# Patient Record
Sex: FEMALE | Race: WHITE | NOT HISPANIC OR LATINO | Employment: FULL TIME | URBAN - METROPOLITAN AREA
[De-identification: names, ages, dates, MRNs, and addresses within clinical notes are randomized per-mention and may not be internally consistent; named-entity substitution may affect disease eponyms.]

---

## 2017-05-04 DIAGNOSIS — Z30.011 ENCOUNTER FOR INITIAL PRESCRIPTION OF CONTRACEPTIVE PILLS: ICD-10-CM

## 2017-05-04 RX ORDER — ACETAMINOPHEN AND CODEINE PHOSPHATE 120; 12 MG/5ML; MG/5ML
1 SOLUTION ORAL DAILY
Qty: 84 TABLET | Refills: 3 | Status: CANCELLED | OUTPATIENT
Start: 2017-05-04

## 2017-05-04 NOTE — TELEPHONE ENCOUNTER
Pending Prescriptions:                       Disp   Refills    norethindrone (MICRONOR) 0.35 MG per tabl*84 tab*3            Sig: Take 1 tablet (0.35 mg) by mouth daily          Last Written Prescription Date: 02/23/2016  Last Fill Quantity: 84, # refills: 3  Last Office Visit with FMWILDA, P or [facility] prescribing provider: 11/28/2016       BP Readings from Last 3 Encounters:   11/28/16 122/70   02/23/16 120/60   01/15/16 117/71     Date of last Breast Exam:     Jad BOWLEST

## 2017-05-04 NOTE — TELEPHONE ENCOUNTER
PT needs PX and would like to discuss possible change to depo    Scheduled PX for tomorrow.     Sunitha Larsen RN

## 2017-05-05 ENCOUNTER — OFFICE VISIT (OUTPATIENT)
Dept: FAMILY MEDICINE | Facility: CLINIC | Age: 32
End: 2017-05-05
Payer: COMMERCIAL

## 2017-05-05 ENCOUNTER — TELEPHONE (OUTPATIENT)
Dept: SURGERY | Facility: CLINIC | Age: 32
End: 2017-05-05

## 2017-05-05 VITALS
WEIGHT: 150.2 LBS | TEMPERATURE: 98.6 F | HEART RATE: 68 BPM | SYSTOLIC BLOOD PRESSURE: 110 MMHG | HEIGHT: 66 IN | BODY MASS INDEX: 24.14 KG/M2 | OXYGEN SATURATION: 98 % | DIASTOLIC BLOOD PRESSURE: 60 MMHG

## 2017-05-05 DIAGNOSIS — Z13.89 SCREENING FOR DIABETIC PERIPHERAL NEUROPATHY: ICD-10-CM

## 2017-05-05 DIAGNOSIS — Z30.42 ENCOUNTER FOR SURVEILLANCE OF INJECTABLE CONTRACEPTIVE: ICD-10-CM

## 2017-05-05 DIAGNOSIS — E10.9 TYPE 1 DIABETES MELLITUS WITHOUT COMPLICATION (H): ICD-10-CM

## 2017-05-05 DIAGNOSIS — Z00.00 ENCOUNTER FOR ROUTINE ADULT HEALTH EXAMINATION WITHOUT ABNORMAL FINDINGS: Primary | ICD-10-CM

## 2017-05-05 DIAGNOSIS — K62.5 HEMORRHAGE OF RECTUM AND ANUS: ICD-10-CM

## 2017-05-05 LAB
BETA HCG QUAL IFA URINE: NEGATIVE
CHOLEST SERPL-MCNC: 142 MG/DL
CREAT SERPL-MCNC: 0.55 MG/DL (ref 0.52–1.04)
CREAT UR-MCNC: 59 MG/DL
GFR SERPL CREATININE-BSD FRML MDRD: NORMAL ML/MIN/1.7M2
HBA1C MFR BLD: 6.5 % (ref 4.3–6)
HDLC SERPL-MCNC: 38 MG/DL
LDLC SERPL CALC-MCNC: 93 MG/DL
MICROALBUMIN UR-MCNC: 8 MG/L
MICROALBUMIN/CREAT UR: 13.36 MG/G CR (ref 0–25)
NONHDLC SERPL-MCNC: 104 MG/DL
TRIGL SERPL-MCNC: 57 MG/DL

## 2017-05-05 PROCEDURE — 36415 COLL VENOUS BLD VENIPUNCTURE: CPT | Performed by: NURSE PRACTITIONER

## 2017-05-05 PROCEDURE — 96372 THER/PROPH/DIAG INJ SC/IM: CPT | Performed by: NURSE PRACTITIONER

## 2017-05-05 PROCEDURE — 83036 HEMOGLOBIN GLYCOSYLATED A1C: CPT | Performed by: NURSE PRACTITIONER

## 2017-05-05 PROCEDURE — 99395 PREV VISIT EST AGE 18-39: CPT | Mod: 25 | Performed by: NURSE PRACTITIONER

## 2017-05-05 PROCEDURE — 80061 LIPID PANEL: CPT | Performed by: NURSE PRACTITIONER

## 2017-05-05 PROCEDURE — 84703 CHORIONIC GONADOTROPIN ASSAY: CPT | Performed by: NURSE PRACTITIONER

## 2017-05-05 PROCEDURE — 82565 ASSAY OF CREATININE: CPT | Performed by: NURSE PRACTITIONER

## 2017-05-05 PROCEDURE — 82043 UR ALBUMIN QUANTITATIVE: CPT | Performed by: NURSE PRACTITIONER

## 2017-05-05 PROCEDURE — 99207 C FOOT EXAM  NO CHARGE: CPT | Mod: 25 | Performed by: NURSE PRACTITIONER

## 2017-05-05 RX ORDER — MEDROXYPROGESTERONE ACETATE 150 MG/ML
150 INJECTION, SUSPENSION INTRAMUSCULAR
Qty: 1 ML | Refills: 3 | OUTPATIENT
Start: 2017-05-05 | End: 2017-08-07 | Stop reason: SINTOL

## 2017-05-05 NOTE — MR AVS SNAPSHOT
After Visit Summary   5/5/2017    Guerline Rock    MRN: [id number]           Patient Information     Date Of Birth          1985        Visit Information        Provider Department      5/5/2017 8:15 AM Ana Paula Hernandez NP [facility]        Today's Diagnoses     Encounter for routine adult health examination without abnormal findings    -  1    Screening for diabetic peripheral neuropathy        Type 1 diabetes mellitus without complication (H)        Encounter for surveillance of injectable contraceptive        Hemorrhage of rectum and anus          Care Instructions      Preventive Health Recommendations  Female Ages 26 - 39  Yearly exam:   See your health care provider every year in order to    Review health changes.     Discuss preventive care.      Review your medicines if you your doctor has prescribed any.    Until age 30: Get a Pap test every three years (more often if you have had an abnormal result).    After age 30: Talk to your doctor about whether you should have a Pap test every 3 years or have a Pap test with HPV screening every 5 years.   You do not need a Pap test if your uterus was removed (hysterectomy) and you have not had cancer.  You should be tested each year for STDs (sexually transmitted diseases), if you're at risk.   Talk to your provider about how often to have your cholesterol checked.  If you are at risk for diabetes, you should have a diabetes test (fasting glucose).  Shots: Get a flu shot each year. Get a tetanus shot every 10 years.   Nutrition:     Eat at least 5 servings of fruits and vegetables each day.    Eat whole-grain bread, whole-wheat pasta and brown rice instead of white grains and rice.    Talk to your provider about Calcium and Vitamin D.     Lifestyle    Exercise at least 150 minutes a week (30 minutes a day, 5 days of the week). This will help you control your weight and prevent disease.    Limit alcohol to one drink per day.    No  smoking.     Wear sunscreen to prevent skin cancer.    See your dentist every six months for an exam and cleaning.          Follow-ups after your visit        Additional Services     COLORECTAL SURGERY REFERRAL       Your provider has referred you to: DANY: [facility] [phone]   [email or web address]    Referral Reason(s): Hemorrhoids  Special Concerns:   This referral is: Elective (week +)  It is OK to leave a message on patient's voicemail.    Please be aware that coverage of these services is subject to the terms and limitations of your health insurance plan.  Call member services at your health plan with any benefit or coverage questions.      Please bring the following with you to your appointment:    (1) Any X-Rays, CTs or MRIs which have been performed.  Contact the facility where they were done to arrange for  prior to your scheduled appointment.    (2) List of current medications  (3) This referral request   (4) Any documents/labs given to you for this referral                  Who to contact     If you have questions or need follow up information about today's clinic visit or your schedule please contact [facility] directly at [phone].  Normal or non-critical lab and imaging results will be communicated to you by MyChart, letter or phone within 4 business days after the clinic has received the results. If you do not hear from us within 7 days, please contact the clinic through MyChart or phone. If you have a critical or abnormal lab result, we will notify you by phone as soon as possible.  Submit refill requests through [employer] or call your pharmacy and they will forward the refill request to us. Please allow 3 business days for your refill to be completed.          Additional Information About Your Visit        [employer] Information     [employer] gives you secure access to your electronic health record. If  "you see a primary care provider, you can also send messages to your care team and make appointments. If you have questions, please call your primary care clinic.  If you do not have a primary care provider, please call [phone] and they will assist you.        Care EveryWhere ID     This is your Care EveryWhere ID. This could be used by other organizations to access your [place] medical records  [id number]        Your Vitals Were     Pulse Temperature Height Last Period Pulse Oximetry Breastfeeding?    68 98.6  F (37  C) (Oral) 5' 6\" (1.676 m) 04/25/2017 (Approximate) 98% No    BMI (Body Mass Index)                   24.24 kg/m2            Blood Pressure from Last 3 Encounters:   05/05/17 110/60   11/28/16 122/70   02/23/16 120/60    Weight from Last 3 Encounters:   05/05/17 150 lb 3.2 oz (68.1 kg)   11/28/16 145 lb (65.8 kg)   02/23/16 154 lb (69.9 kg)              We Performed the Following     Albumin Random Urine Quantitative     Beta HCG qual IFA urine     COLORECTAL SURGERY REFERRAL     CREATININE     FOOT EXAM  NO CHARGE [26787.114]     Hemoglobin A1c     Lipid panel reflex to direct LDL          Today's Medication Changes          These changes are accurate as of: 5/5/17  9:04 AM.  If you have any questions, ask your nurse or doctor.               Start taking these medicines.        Dose/Directions    blood glucose monitoring test strip   Commonly known as:  no brand specified   Used for:  Type 1 diabetes mellitus without complication (H)   Started by:  Ana Paula Hernandez NP        Use to test blood sugars 6 times daily or as directed   Quantity:  100 strip   Refills:  11       medroxyPROGESTERone 150 MG/ML injection   Commonly known as:  DEPO-PROVERA   Used for:  Encounter for surveillance of injectable contraceptive   Started by:  Ana Paula Hernandez NP        Dose:  150 mg   Inject 1 mL (150 mg) into the muscle every 3 months   Quantity:  1 mL   Refills:  3            Where to get your medicines    "   These medications were sent to [facility] [address] [place], [place] - [address] [facility]  [address] [facility] [address]    Hours:  Tech issues with their phone system Phone:  [phone]     blood glucose monitoring test strip         Some of these will need a paper prescription and others can be bought over the counter.  Ask your nurse if you have questions.     You don't need a prescription for these medications     medroxyPROGESTERone 150 MG/ML injection                Primary Care Provider Office Phone # Fax #    Ana Paula Hernandez [phone]       [facility] [address] [address] [facility] [address]        Thank you!     Thank you for choosing [facility]  for your care. Our goal is always to provide you with excellent care. Hearing back from our patients is one way we can continue to improve our services. Please take a few minutes to complete the written survey that you may receive in the mail after your visit with us. Thank you!             Your Updated Medication List - Protect others around you: Learn how to safely use, store and throw away your medicines at www.disposemymeds.org.          This list is accurate as of: 5/5/17  9:04 AM.  Always use your most recent med list.                   Brand Name Dispense Instructions for use    blood glucose monitoring test strip    no brand specified    100 strip    Use to test blood sugars 6 times daily or as directed       humaLOG 100 UNIT/ML injection   Generic drug:  insulin lispro     3 Month    *** units before breakfast, *** units before lunch, *** units before dinner       medroxyPROGESTERone 150 MG/ML injection    DEPO-PROVERA    1 mL    Inject 1 mL (150 mg) into the muscle every 3 months       norethindrone 0.35 MG per tablet    MICRONOR    84 tablet    Take 1 tablet (0.35 mg) by mouth daily       OMEGA-3 FISH OIL PO      Reported on 5/5/2017

## 2017-05-05 NOTE — TELEPHONE ENCOUNTER
Referral received from Ana Paula Hernandez  for hemorrhage of rectum and anus.    Attempt #1:    Called patient at [phone].   No answer - left message for patient to return call to clinic at [phone].  Margie

## 2017-05-05 NOTE — PROGRESS NOTES
SUBJECTIVE:     CC: Guerline Rock is an 31 year old woman who presents for preventive health visit.     Healthy Habits:    Do you get at least three servings of calcium containing foods daily (dairy, green leafy vegetables, etc.)? yes    Amount of exercise or daily activities, outside of work: 4 day(s) per week    Problems taking medications regularly No    Medication side effects: No    Have you had an eye exam in the past two years? no    Do you see a dentist twice per year? yes    Do you have sleep apnea, excessive snoring or daytime drowsiness?yes  Guerline is here for a physical and to get test strips to check her diabetes Type 1. Needs to go back to endocrine but has been without good insurance.       No concerns    Today's PHQ-2 Score:   PHQ-2 ( 1999 Pf[employer]) 11/28/2016 6/30/2015   Q1: Little interest or pleasure in doing things 0 0   Q2: Feeling down, depressed or hopeless 0 0   PHQ-2 Score 0 0       Abuse: Current or Past(Physical, Sexual or Emotional)- No  Do you feel safe in your environment - Yes    Social History   Substance Use Topics     Smoking status: Never Smoker     Smokeless tobacco: Never Used     Alcohol use No     The patient does not drink >3 drinks per day nor >7 drinks per week.    Recent Labs   Lab Test 02/17/16 11/24/14   CHOL  198  165   HDL  50  37   LDL  119  105   TRIG  147  114   CHOLHDLRATIO   --   4.5   NHDL  148   --        Reviewed orders with patient.  Reviewed health maintenance and updated orders accordingly - Yes    Mammo Decision Support:  Mammogram not appropriate for this patient based on age.    Pertinent mammograms are reviewed under the imaging tab.  History of abnormal Pap smear: NO - age 21-29 PAP every 3 years recommended    Reviewed and updated as needed this visit by clinical staff  Tobacco  Allergies  Meds  Problems  Med Hx  Surg Hx  Fam Hx  Soc Hx          Reviewed and updated as needed this visit by Provider  Allergies  Meds  Problems  Med Hx  Surg Hx  Fam  "Hx            ROS:  C: NEGATIVE for fever, chills, change in weight  I: NEGATIVE for worrisome rashes, moles or lesions  E: NEGATIVE for vision changes or irritation  ENT: NEGATIVE for ear, mouth and throat problems  R: NEGATIVE for significant cough or SOB  B: NEGATIVE for masses, tenderness or discharge  CV: NEGATIVE for chest pain, palpitations or peripheral edema  GI: NEGATIVE for nausea, abdominal pain, heartburn, or change in bowel habits  : NEGATIVE for unusual urinary or vaginal symptoms. Periods are regular.  M: NEGATIVE for significant arthralgias or myalgia  N: NEGATIVE for weakness, dizziness or paresthesias  P: NEGATIVE for changes in mood or affect    Problem list, Medication list, Allergies, and Medical/Social/Surgical histories reviewed in EPIC and updated as appropriate.  OBJECTIVE:     /60 (BP Location: Right arm, Patient Position: Chair, Cuff Size: Adult Regular)  Pulse 68  Temp 98.6  F (37  C) (Oral)  Ht 5' 6\" (1.676 m)  Wt 150 lb 3.2 oz (68.1 kg)  LMP 04/25/2017 (Approximate)  SpO2 98%  Breastfeeding? No  BMI 24.24 kg/m2  EXAM:  GENERAL: healthy, alert and no distress  EYES: Eyes grossly normal to inspection, PERRL and conjunctivae and sclerae normal  HENT: ear canals and TM's normal, nose and mouth without ulcers or lesions  NECK: no adenopathy, no asymmetry, masses, or scars and thyroid normal to palpation  RESP: lungs clear to auscultation - no rales, rhonchi or wheezes  CV: regular rate and rhythm, normal S1 S2, no S3 or S4, no murmur, click or rub, no peripheral edema and peripheral pulses strong  ABDOMEN: soft, nontender, no hepatosplenomegaly, no masses and bowel sounds normal  MS: no gross musculoskeletal defects noted, no edema  SKIN: no suspicious lesions or rashes  BACK: no CVA tenderness, no paralumbar tenderness  PSYCH: mentation appears normal, affect normal/bright    ASSESSMENT/PLAN:     1. Encounter for routine adult health examination without abnormal " "findings  Fasting lipid panel drawn today  - Lipid panel reflex to direct LDL    2. Screening for diabetic peripheral neuropathy  Foot exam is within normal limits. 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   - FOOT EXAM  NO CHARGE [41776.304]    3. Type 1 diabetes mellitus without complication (H)  A1c is 6.5 and up slightly from 6.3. Will be going to endocrine in the next month.   - CREATININE  - Albumin Random Urine Quantitative  - Hemoglobin A1c  - blood glucose monitoring (NO BRAND SPECIFIED) test strip; Use to test blood sugars 6 times daily or as directed  Dispense: 100 strip; Refill: 11    4. Encounter for surveillance of injectable contraceptive  Would like to start on Depo provera since unable to remember to take OCP.. Urine pregnancy is negative.   - Beta HCG qual IFA urine  - medroxyPROGESTERone (DEPO-PROVERA) 150 MG/ML injection; Inject 1 mL (150 mg) into the muscle every 3 months  Dispense: 1 mL; Refill: 3    5. Hemorrhage of rectum and anus  Ongoing issues with hemorrhoids.   - COLORECTAL SURGERY REFERRAL    COUNSELING:   Reviewed preventive health counseling, as reflected in patient instructions       Regular exercise       Healthy diet/nutrition       Contraception         reports that she has never smoked. She has never used smokeless tobacco.    Estimated body mass index is 24.24 kg/(m^2) as calculated from the following:    Height as of this encounter: 5' 6\" (1.676 m).    Weight as of this encounter: 150 lb 3.2 oz (68.1 kg).       Counseling Resources:  ATP IV Guidelines  Pooled Cohorts Equation Calculator  Breast Cancer Risk Calculator  FRAX Risk Assessment  ICSI Preventive Guidelines  Dietary Guidelines for Americans, 2010  USDA's MyPlate  ASA Prophylaxis  Lung CA Screening    Ana Paula Hernandez NP  [facility]  "

## 2017-05-12 ENCOUNTER — OFFICE VISIT (OUTPATIENT)
Dept: SURGERY | Facility: CLINIC | Age: 32
End: 2017-05-12
Payer: COMMERCIAL

## 2017-05-12 VITALS
HEART RATE: 78 BPM | HEIGHT: 66 IN | SYSTOLIC BLOOD PRESSURE: 122 MMHG | OXYGEN SATURATION: 99 % | BODY MASS INDEX: 24.11 KG/M2 | DIASTOLIC BLOOD PRESSURE: 68 MMHG | WEIGHT: 150 LBS

## 2017-05-12 DIAGNOSIS — L29.0 ANAL ITCHING: Primary | ICD-10-CM

## 2017-05-12 PROCEDURE — 99203 OFFICE O/P NEW LOW 30 MIN: CPT | Mod: 25 | Performed by: SURGERY

## 2017-05-12 PROCEDURE — 46600 DIAGNOSTIC ANOSCOPY SPX: CPT | Performed by: SURGERY

## 2017-05-12 NOTE — LETTER
[facility]      Outpatient Consultation    May 12, 2017      Guerline Rock MRN# [id number]   YOB: 1985 Age: 31 year old      Primary care provider: Ana Paula Hernandez     ASSESSMENT:   Guerline Rock is an 31 year old year old female who I was asked to see by Dr. Ana Paula Hernandez for anorectal problem.     Pruritus ani with bleeding.     RECOMMENDATIONS:   We discussed this. Anal itching may result from a number of possible triggers including overly aggressive hygiene, dietary factors, infections, seepage, or other reasons. I recommended that we attempt to address these potential causes. Certain foods such as beer, cola, coffee, etc. can be associated with this and I recommended to her that she cease intake of these sequentially as a test to see if this affects things. I would like her not to do overly aggressive hygiene and to use mild soaps only. I think she should try fiber to see if this has any effect because this may decrease any tendency for seepage, which she may not even be aware of, but that could contribute itching. I prescribed her a combination of pramoxine hydrocortisone. If she is not getting better with these measures I would like her to check back with me.     Alex Quezada MD  [phone] (t)     This note was created using voice recognition software. Undetected word substitutions or other errors may have occurred.        HPI:    Guerline Rock is a 31 year old female who I was asked to see for hemorrhoids. The patient has been having difficulty with some bleeding. When I discussed this with her she tells me that her biggest concern is actually that she will have significant anal itching and will wipe vigorously for this and then will have some bleeding. She states that she itches fairly frequent. She has not noted any triggers. She does not recognize any significant anal seepage. There is no personal or family history of colon malignancies or IBD. The bleeding she has usually  "bright red and is on the toilet tissue.        ALLERGIES:   No Known Allergies      REVIEW OF SYSTEMS:   10 point ROS neg other than the symptoms noted above in the HPI or here.       PHYSICAL EXAM:   /68  Pulse 78  Ht 5' 6\" (1.676 m)  Wt 150 lb (68 kg)  LMP 04/25/2017 (Approximate)  SpO2 99%  BMI 24.21 kg/m2 Estimated body mass index is 24.21 kg/(m^2) as calculated from the following:    Height as of this encounter: 5' 6\" (1.676 m).    Weight as of this encounter: 150 lb (68 kg).   Constitutional: No acute distress  Eyes: Sclerae anicteric, moist conjunctivae; no lid-lag; PERRLA  ENT: Atraumatic; oropharynx clear with moist mucous membranes and no mucosal ulcerations; normal hard and soft palate  Neck: Supple neck without lymphadenopathy, no thyromegaly  Respiratory: Clear to auscultation bilaterally with normal respiratory effort  Cardiovascular: Regular rate and rhythm, no MRGs  GI: Soft, nontender, nondistended; no masses or HSM  Lymph/Hematologic: No pretibial edema  Genitourinary: Deferred  Skin: Normal temperature, turgor and texture; no rash, ulcers or subcutaneous nodules  Musculoskeletal: Grossly symmetric and normal muscle bulk for age in all extremities; gait and station normal; no clubbing or cyanosis  Neurologic: CN II-XII grossly intact without deficits; sensation intact to light touch over all extremities  Neuropsychiatric: Appropriate affect, alert and oriented to person, place and time  The patient is taken to the procedure room and placed in the knee-chest position. A rectal examination is performed. There are findings in the anterior midline of very small external hemorrhoid which appears roughened and denuded as well as friable. A digital rectal examination is performed. There is no mass. Anoscopy is performed. There are minimal internal hemorrhoids.     Alex Quezada MD  "

## 2017-05-12 NOTE — MR AVS SNAPSHOT
"              After Visit Summary   5/12/2017    Guerline Rock    MRN: [id number]           Patient Information     Date Of Birth          1985        Visit Information        Provider Department      5/12/2017 9:45 AM Alex Quezada MD [facility]      Today's Diagnoses     Anal itching    -  1       Follow-ups after your visit        Who to contact     If you have questions or need follow up information about today's clinic visit or your schedule please contact [facility] directly at [phone].  Normal or non-critical lab and imaging results will be communicated to you by [employer], letter or phone within 4 business days after the clinic has received the results. If you do not hear from us within 7 days, please contact the clinic through [employer] or phone. If you have a critical or abnormal lab result, we will notify you by phone as soon as possible.  Submit refill requests through [employer] or call your pharmacy and they will forward the refill request to us. Please allow 3 business days for your refill to be completed.          Additional Information About Your Visit        MyChart Information     [employer] gives you secure access to your electronic health record. If you see a primary care provider, you can also send messages to your care team and make appointments. If you have questions, please call your primary care clinic.  If you do not have a primary care provider, please call [phone] and they will assist you.        Care EveryWhere ID     This is your Care EveryWhere ID. This could be used by other organizations to access your [place] medical records  [id number]        Your Vitals Were     Pulse Height Last Period Pulse Oximetry BMI (Body Mass Index)       78 5' 6\" (1.676 m) 04/25/2017 (Approximate) 99% 24.21 kg/m2        Blood Pressure from Last 3 Encounters:   05/12/17 122/68   05/05/17 110/60   11/28/16 " 122/70    Weight from Last 3 Encounters:   05/12/17 150 lb (68 kg)   05/05/17 150 lb 3.2 oz (68.1 kg)   11/28/16 145 lb (65.8 kg)              Today, you had the following     No orders found for display         Today's Medication Changes          These changes are accurate as of: 5/12/17 11:59 PM.  If you have any questions, ask your nurse or doctor.               Start taking these medicines.        Dose/Directions    hydrocortisone-pramoxine 2.5-1 % cream   Commonly known as:  ANALPRAM HC   Used for:  Anal itching   Started by:  Alex Quezada MD        Place rectally 3 times daily   Quantity:  30 g   Refills:  1            Where to get your medicines      These medications were sent to [facility] [id number] [address]    Hours:  Tech issues with their phone system Phone:  [phone]     hydrocortisone-pramoxine 2.5-1 % cream                Primary Care Provider Office Phone # Fax #    Ana Paula Hernandez [phone]       [facility] [address] [address] [employer] [address]        Thank you!     Thank you for choosing [facility]  for your care. Our goal is always to provide you with excellent care. Hearing back from our patients is one way we can continue to improve our services. Please take a few minutes to complete the written survey that you may receive in the mail after your visit with us. Thank you!             Your Updated Medication List - Protect others around you: Learn how to safely use, store and throw away your medicines at www.disposemymeds.org.          This list is accurate as of: 5/12/17 11:59 PM.  Always use your most recent med list.                   Brand Name Dispense Instructions for use    blood glucose monitoring test strip    no brand specified    100 strip    Use to test blood sugars 6 times daily or as directed       humaLOG 100 UNIT/ML injection   Generic drug:   insulin lispro     3 Month    *** units before breakfast, *** units before lunch, *** units before dinner       hydrocortisone-pramoxine 2.5-1 % cream    ANALPRAM HC    30 g    Place rectally 3 times daily       medroxyPROGESTERone 150 MG/ML injection    DEPO-PROVERA    1 mL    Inject 1 mL (150 mg) into the muscle every 3 months       norethindrone 0.35 MG per tablet    MICRONOR    84 tablet    Take 1 tablet (0.35 mg) by mouth daily       OMEGA-3 FISH OIL PO      Reported on 5/5/2017

## 2017-05-23 ENCOUNTER — TRANSFERRED RECORDS (OUTPATIENT)
Dept: HEALTH INFORMATION MANAGEMENT | Facility: CLINIC | Age: 32
End: 2017-05-23

## 2017-05-23 LAB
ALT SERPL-CCNC: 17 U/L (ref 6–29)
CHOLEST SERPL-MCNC: 135 MG/DL (ref 125–200)
CREAT SERPL-MCNC: 0.59 MG/DL (ref 0.5–1.1)
GFR SERPL CREATININE-BSD FRML MDRD: 124 ML/MIN/1.73M2
GLUCOSE SERPL-MCNC: 119 MG/DL (ref 65–99)
HBA1C MFR BLD: 6.6 % (ref 4–6)
HDLC SERPL-MCNC: 34 MG/DL
LDLC SERPL CALC-MCNC: 81 MG/DL
NONHDLC SERPL-MCNC: 101 MG/DL
POTASSIUM SERPL-SCNC: 4 MMOL/L (ref 3.5–5.3)
TRIGL SERPL-MCNC: 99 MG/DL
TSH SERPL-ACNC: 1.21 UIU/ML (ref 0.3–5)

## 2017-05-23 NOTE — PROGRESS NOTES
[facility]     Outpatient Consultation     Guerline Rock MRN# [id number]   YOB: 1985 Age: 31 year old     Primary care provider: Ana Paula Hernandez    ASSESSMENT:   Guerline Rock is an 31 year old year old female who I was asked to see by Dr. Ana Paula Hernandez for anorectal problem.    Pruritus ani with bleeding.    RECOMMENDATIONS:   We discussed this. Anal itching may result from a number of possible triggers including overly aggressive hygiene, dietary factors, infections, seepage, or other reasons. I recommended that we attempt to address these potential causes. Certain foods such as beer, cola, coffee, etc. can be associated with this and I recommended to her that she cease intake of these sequentially as a test to see if this affects things. I would like her not to do overly aggressive hygiene and to use mild soaps only. I think she should try fiber to see if this has any effect because this may decrease any tendency for seepage, which she may not even be aware of, but that could contribute itching. I prescribed her a combination of pramoxine hydrocortisone. If she is not getting better with these measures I would like her to check back with me.    Alex Quezada MD  [phone] (h)    This note was created using voice recognition software. Undetected word substitutions or other errors may have occurred.      HPI:    Guerline Rock is a 31 year old female who I was asked to see for hemorrhoids. The patient has been having difficulty with some bleeding. When I discussed this with her she tells me that her biggest concern is actually that she will have significant anal itching and will wipe vigorously for this and then will have some bleeding. She states that she itches fairly frequent. She has not noted any triggers. She does not recognize any significant anal seepage. There is no personal or family history of colon malignancies or IBD. The bleeding she has usually bright red and is on the  "toilet tissue.          PAST MEDICAL HISTORY:     Past Medical History:   Diagnosis Date     Diabetes mellitus type 1 (H) 1/16/2014        PAST SURGICAL HISTORY:     Past Surgical History:   Procedure Laterality Date     DISCECTOMY LUMBAR MINIMALLY INVASIVE ONE LEVEL  2010, 2001    L4-L5     EYE SURGERY       FOOT SURGERY         SOCIAL HISTORY:     Social History     Social History     Marital status:      Spouse name: N/A     Number of children: N/A     Years of education: N/A     Social History Main Topics     Smoking status: Never Smoker     Smokeless tobacco: Never Used     Alcohol use No     Drug use: No     Sexual activity: Yes     Partners: Male     Other Topics Concern     Parent/Sibling W/ Cabg, Mi Or Angioplasty Before 65f 55m? No     Social History Narrative        FAMILY HISTORY:     Family History   Problem Relation Age of Onset     Family History Negative Mother      Family History Negative Father        MEDICATIONS:     Current Outpatient Prescriptions   Medication Sig Dispense Refill     hydrocortisone-pramoxine (ANALPRAM HC) 2.5-1 % cream Place rectally 3 times daily 30 g 1     blood glucose monitoring (NO BRAND SPECIFIED) test strip Use to test blood sugars 6 times daily or as directed 100 strip 11     medroxyPROGESTERone (DEPO-PROVERA) 150 MG/ML injection Inject 1 mL (150 mg) into the muscle every 3 months 1 mL 3     Omega-3 Fatty Acids (OMEGA-3 FISH OIL PO) Reported on 5/5/2017       insulin lispro (HUMALOG VIAL) SOLN 100 UNIT/ML   3 Month 3     norethindrone (MICRONOR) 0.35 MG per tablet Take 1 tablet (0.35 mg) by mouth daily (Patient not taking: Reported on 5/12/2017) 84 tablet 3        ALLERGIES:   No Known Allergies     REVIEW OF SYSTEMS:   10 point ROS neg other than the symptoms noted above in the HPI or here.      PHYSICAL EXAM:   /68  Pulse 78  Ht 5' 6\" (1.676 m)  Wt 150 lb (68 kg)  LMP 04/25/2017 (Approximate)  SpO2 99%  BMI 24.21 kg/m2 Estimated body mass index is " "24.21 kg/(m^2) as calculated from the following:    Height as of this encounter: 5' 6\" (1.676 m).    Weight as of this encounter: 150 lb (68 kg).   Constitutional: No acute distress  Eyes: Sclerae anicteric, moist conjunctivae; no lid-lag; PERRLA  ENT: Atraumatic; oropharynx clear with moist mucous membranes and no mucosal ulcerations; normal hard and soft palate  Neck: Supple neck without lymphadenopathy, no thyromegaly  Respiratory: Clear to auscultation bilaterally with normal respiratory effort  Cardiovascular: Regular rate and rhythm, no MRGs  GI: Soft, nontender, nondistended; no masses or HSM  Lymph/Hematologic: No pretibial edema  Genitourinary: Deferred  Skin: Normal temperature, turgor and texture; no rash, ulcers or subcutaneous nodules  Musculoskeletal: Grossly symmetric and normal muscle bulk for age in all extremities; gait and station normal; no clubbing or cyanosis  Neurologic: CN II-XII grossly intact without deficits; sensation intact to light touch over all extremities  Neuropsychiatric: Appropriate affect, alert and oriented to person, place and time  The patient is taken to the procedure room and placed in the knee-chest position. A rectal examination is performed. There are findings in the anterior midline of very small external hemorrhoid which appears roughened and denuded as well as friable. A digital rectal examination is performed. There is no mass. Anoscopy is performed. There are minimal internal hemorrhoids.     LABORATORY AND IMAGING:      Results for orders placed or performed in visit on 05/05/17   CREATININE   Result Value Ref Range    Creatinine 0.55 0.52 - 1.04 mg/dL    GFR Estimate >90  Non  GFR Calc   >60 mL/min/1.7m2    GFR Estimate If Black >90   GFR Calc   >60 mL/min/1.7m2   Lipid panel reflex to direct LDL   Result Value Ref Range    Cholesterol 142 <200 mg/dL    Triglycerides 57 <150 mg/dL    HDL Cholesterol 38 (L) >49 mg/dL    LDL " Cholesterol Calculated 93 <100 mg/dL    Non HDL Cholesterol 104 <130 mg/dL   Albumin Random Urine Quantitative   Result Value Ref Range    Creatinine Urine 59 mg/dL    Albumin Urine mg/L 8 mg/L    Albumin Urine mg/g Cr 13.36 0 - 25 mg/g Cr   Hemoglobin A1c   Result Value Ref Range    Hemoglobin A1C 6.5 (H) 4.3 - 6.0 %   Beta HCG qual IFA urine   Result Value Ref Range    Beta HCG Qual IFA Urine Negative NEG         45 minutes were spent in this encounter, over 50% in counseling and coordination of care.    Please route or send letter to:  Referring Provider

## 2017-05-24 ENCOUNTER — TRANSFERRED RECORDS (OUTPATIENT)
Dept: HEALTH INFORMATION MANAGEMENT | Facility: CLINIC | Age: 32
End: 2017-05-24

## 2017-06-05 ENCOUNTER — OFFICE VISIT (OUTPATIENT)
Dept: FAMILY MEDICINE | Facility: CLINIC | Age: 32
End: 2017-06-05
Payer: COMMERCIAL

## 2017-06-05 VITALS
SYSTOLIC BLOOD PRESSURE: 104 MMHG | HEART RATE: 91 BPM | WEIGHT: 145 LBS | DIASTOLIC BLOOD PRESSURE: 70 MMHG | BODY MASS INDEX: 23.3 KG/M2 | RESPIRATION RATE: 16 BRPM | TEMPERATURE: 97.8 F | OXYGEN SATURATION: 100 % | HEIGHT: 66 IN

## 2017-06-05 DIAGNOSIS — E10.9 TYPE 1 DIABETES MELLITUS WITHOUT COMPLICATION (H): ICD-10-CM

## 2017-06-05 DIAGNOSIS — J06.9 UPPER RESPIRATORY TRACT INFECTION, UNSPECIFIED TYPE: Primary | ICD-10-CM

## 2017-06-05 PROCEDURE — 99213 OFFICE O/P EST LOW 20 MIN: CPT | Performed by: NURSE PRACTITIONER

## 2017-06-05 RX ORDER — AZITHROMYCIN 250 MG/1
TABLET, FILM COATED ORAL
Qty: 6 TABLET | Refills: 0 | Status: SHIPPED | OUTPATIENT
Start: 2017-06-05 | End: 2018-08-22

## 2017-06-05 NOTE — MR AVS SNAPSHOT
"              After Visit Summary   6/5/2017    Guerline Rock    MRN: [id number]           Patient Information     Date Of Birth          1985        Visit Information        Provider Department      6/5/2017 2:00 PM Ana Paula Hernandez NP [facility]        Today's Diagnoses     Upper respiratory tract infection, unspecified type    -  1       Follow-ups after your visit        Who to contact     If you have questions or need follow up information about today's clinic visit or your schedule please contact [facility] directly at [phone].  Normal or non-critical lab and imaging results will be communicated to you by [employer], letter or phone within 4 business days after the clinic has received the results. If you do not hear from us within 7 days, please contact the clinic through [employer] or phone. If you have a critical or abnormal lab result, we will notify you by phone as soon as possible.  Submit refill requests through [employer] or call your pharmacy and they will forward the refill request to us. Please allow 3 business days for your refill to be completed.          Additional Information About Your Visit        MyChart Information     [employer] gives you secure access to your electronic health record. If you see a primary care provider, you can also send messages to your care team and make appointments. If you have questions, please call your primary care clinic.  If you do not have a primary care provider, please call [phone] and they will assist you.        Care EveryWhere ID     This is your Care EveryWhere ID. This could be used by other organizations to access your [place] medical records  [id number]        Your Vitals Were     Pulse Temperature Respirations Height Pulse Oximetry Breastfeeding?    91 97.8  F (36.6  C) (Oral) 16 5' 6\" (1.676 m) 100% No    BMI (Body Mass Index)                   23.4 kg/m2            Blood Pressure from Last 3 Encounters:   06/05/17 " 104/70   05/12/17 122/68   05/05/17 110/60    Weight from Last 3 Encounters:   06/05/17 145 lb (65.8 kg)   05/12/17 150 lb (68 kg)   05/05/17 150 lb 3.2 oz (68.1 kg)              Today, you had the following     No orders found for display         Today's Medication Changes          These changes are accurate as of: 6/5/17  2:28 PM.  If you have any questions, ask your nurse or doctor.               Start taking these medicines.        Dose/Directions    azithromycin 250 MG tablet   Commonly known as:  ZITHROMAX   Used for:  Upper respiratory tract infection, unspecified type   Started by:  Ana Paula Hernandez NP        Two tablets first day, then one tablet daily for four days.   Quantity:  6 tablet   Refills:  0            Where to get your medicines      These medications were sent to [place] [address] [place] [id number]    Hours:  Tech issues with their phone system Phone:  [phone]     azithromycin 250 MG tablet                Primary Care Provider Office Phone # Fax #    Ana Paula Hernandez [phone]       [facility] [address] [address] [facility] [address]        Thank you!     Thank you for choosing [facility]  for your care. Our goal is always to provide you with excellent care. Hearing back from our patients is one way we can continue to improve our services. Please take a few minutes to complete the written survey that you may receive in the mail after your visit with us. Thank you!             Your Updated Medication List - Protect others around you: Learn how to safely use, store and throw away your medicines at www.disposemymeds.org.          This list is accurate as of: 6/5/17  2:28 PM.  Always use your most recent med list.                   Brand Name Dispense Instructions for use    azithromycin 250 MG tablet    ZITHROMAX    6 tablet    Two tablets first day, then one tablet daily for four days.        blood glucose monitoring test strip    no brand specified    100 strip    Use to test blood sugars 6 times daily or as directed       humaLOG 100 UNIT/ML injection   Generic drug:  insulin lispro     3 Month    units before breakfast,  units before lunch,  units before dinner one unit per 15 carbs       hydrocortisone-pramoxine 2.5-1 % cream    ANALPRAM HC    30 g    Place rectally 3 times daily       medroxyPROGESTERone 150 MG/ML injection    DEPO-PROVERA    1 mL    Inject 1 mL (150 mg) into the muscle every 3 months       norethindrone 0.35 MG per tablet    MICRONOR    84 tablet    Take 1 tablet (0.35 mg) by mouth daily       OMEGA-3 FISH OIL PO      Reported on 5/5/2017

## 2017-06-05 NOTE — NURSING NOTE
"Chief Complaint   Patient presents with     URI       Initial /70 (BP Location: Right arm, Patient Position: Chair, Cuff Size: Adult Regular)  Pulse 91  Temp 97.8  F (36.6  C) (Oral)  Resp 16  Ht 5' 6\" (1.676 m)  Wt 145 lb (65.8 kg)  SpO2 100%  Breastfeeding? No  BMI 23.4 kg/m2 Estimated body mass index is 23.4 kg/(m^2) as calculated from the following:    Height as of this encounter: 5' 6\" (1.676 m).    Weight as of this encounter: 145 lb (65.8 kg).  Medication Reconciliation: complete     Arya Disla CMA          "

## 2017-06-05 NOTE — PROGRESS NOTES
SUBJECTIVE:                                                    Guerline Rock is a 31 year old female who presents to clinic today for the following health issues:      RESPIRATORY SYMPTOMS      Duration: 3 weeks    Description  nasal congestion, facial pain/pressure, cough, headache and fatigue/malaise    Severity: moderate    Accompanying signs and symptoms: above    History (predisposing factors):  none    Precipitating or alleviating factors: worse at bedtime    Therapies tried and outcome:  otc cold and allergy meds. Not help much   Guerline is here with worsening cough and congestion with fatigue and fever for the past three weeks. Has been taking allergy medications thinking it would help with congestion. No improvement and symptoms are getting worse. Loss of appetite. Type 1 diabetic as well.         Problem list and histories reviewed & adjusted, as indicated.  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     S/P lumbar discectomy     Pregnancy and insulin-dependent diabetes mellitus, second trimester (H)     Type 1 diabetes mellitus without complication (H)     Pregnancy and insulin-dependent diabetes mellitus in third trimester (H)     Group B Streptococcus carrier, +RV culture, currently pregnant     Indication for care or intervention in labor or delivery      (spontaneous vaginal delivery)     Past Surgical History:   Procedure Laterality Date     DISCECTOMY LUMBAR MINIMALLY INVASIVE ONE LEVEL  2001    L4-L5     EYE SURGERY       FOOT SURGERY         Social History   Substance Use Topics     Smoking status: Never Smoker     Smokeless tobacco: Never Used     Alcohol use No     Family History   Problem Relation Age of Onset     Family History Negative Mother      Family History Negative Father            Reviewed and updated as needed this visit by clinical staff  Allergies       Reviewed and updated as needed this visit by  "Provider         ROS:  Constitutional, HEENT, cardiovascular, pulmonary, gi and gu systems are negative, except as otherwise noted.    OBJECTIVE:                                                    /70 (BP Location: Right arm, Patient Position: Chair, Cuff Size: Adult Regular)  Pulse 91  Temp 97.8  F (36.6  C) (Oral)  Resp 16  Ht 5' 6\" (1.676 m)  Wt 145 lb (65.8 kg)  SpO2 100%  Breastfeeding? No  BMI 23.4 kg/m2  Body mass index is 23.4 kg/(m^2).  GENERAL: healthy, alert and no distress  EYES: Eyes grossly normal to inspection, PERRL and conjunctivae and sclerae normal  HENT: ear canals and TM's normal, nose and mouth without ulcers or lesions  NECK: no adenopathy, no asymmetry, masses, or scars and thyroid normal to palpation  RESP: rhonchi left lower lobe, productive loose cough.   CV: regular rate and rhythm, normal S1 S2, no S3 or S4, no murmur, click or rub, no peripheral edema and peripheral pulses strong  PSYCH: mentation appears normal, affect normal/bright    none      ASSESSMENT/PLAN:                                                            1. Upper respiratory tract infection, unspecified type  Will treat with azithromycin for five days. Trying to avoid prednisone with history of diabetes. Encouraged fluids and use of mucinex to help with congestion.   - azithromycin (ZITHROMAX) 250 MG tablet; Two tablets first day, then one tablet daily for four days.  Dispense: 6 tablet; Refill: 0    2. Type 1 diabetes mellitus without complication (H)  Last A1c was 6.6 and endocrine has recommended increasing her activity and modifying her diet.       Follow up if no improvement.     Ana Paula Hernandez NP  [facility]    "

## 2017-07-24 ENCOUNTER — MYC MEDICAL ADVICE (OUTPATIENT)
Dept: FAMILY MEDICINE | Facility: CLINIC | Age: 32
End: 2017-07-24

## 2017-07-25 NOTE — TELEPHONE ENCOUNTER
"Pt called upset that she needs to make an appt for changing medications.  Writer explained that this is protocol if they are changing medications.  Per her OV visit she had issues remembering to take the OCP so she should discuss this with PCP.  \"I've been on the pill for 15 years so I don't have an issue and I was just being honest that I forget to take it!\"   Pt became upset and states that she will just go elsewhere because we just want to bill her and she doesn't have time for this. Writer offered the virtual visit options but pt hung up    Routing to PCP and to clinic administrator    Adama Rojas RN, BSN    "

## 2017-08-04 ENCOUNTER — E-VISIT (OUTPATIENT)
Dept: FAMILY MEDICINE | Facility: CLINIC | Age: 32
End: 2017-08-04
Payer: COMMERCIAL

## 2017-08-04 DIAGNOSIS — Z30.011 ENCOUNTER FOR INITIAL PRESCRIPTION OF CONTRACEPTIVE PILLS: ICD-10-CM

## 2017-08-04 PROCEDURE — 99207 ZZC NO CHARGE LOS: CPT | Performed by: NURSE PRACTITIONER

## 2017-08-07 RX ORDER — ACETAMINOPHEN AND CODEINE PHOSPHATE 120; 12 MG/5ML; MG/5ML
1 SOLUTION ORAL DAILY
Qty: 84 TABLET | Refills: 3 | Status: SHIPPED | OUTPATIENT
Start: 2017-08-07 | End: 2018-07-15

## 2018-05-14 ENCOUNTER — TRANSFERRED RECORDS (OUTPATIENT)
Dept: HEALTH INFORMATION MANAGEMENT | Facility: CLINIC | Age: 33
End: 2018-05-14

## 2018-05-14 LAB
ALT SERPL-CCNC: 20 U/L (ref 6–29)
CHOLEST SERPL-MCNC: 172 MG/DL
CREAT SERPL-MCNC: 0.65 MG/DL (ref 0.5–1.1)
GFR SERPL CREATININE-BSD FRML MDRD: 119 ML/MIN/1.73M2
GLUCOSE SERPL-MCNC: 206 MG/DL (ref 65–99)
HBA1C MFR BLD: 7 % (ref 4–6)
HDLC SERPL-MCNC: 44 MG/DL
LDLC SERPL CALC-MCNC: 107 MG/DL
NONHDLC SERPL-MCNC: 128 MG/DL
POTASSIUM SERPL-SCNC: 4.2 MMOL/L (ref 3.5–5.3)
TRIGL SERPL-MCNC: 115 MG/DL
TSH SERPL-ACNC: 1.38 UIU/ML (ref 0.3–5)

## 2018-07-15 DIAGNOSIS — Z30.011 ENCOUNTER FOR INITIAL PRESCRIPTION OF CONTRACEPTIVE PILLS: ICD-10-CM

## 2018-07-15 NOTE — TELEPHONE ENCOUNTER
"Requested Prescriptions   Pending Prescriptions Disp Refills     norethindrone (MICRONOR) 0.35 MG per tablet [Pharmacy Med Name: NORETHINDRONE 0.35 MG TABLET]  Last Written Prescription Date:  08/07/2017  Last Fill Quantity: 84,  # refills: 3   Last office visit: 6/5/2017 with prescribing provider:  08/04/2017   Future Office Visit:     84 tablet 3     Sig: TAKE 1 TABLET (0.35 MG) BY MOUTH DAILY    Contraceptives Protocol Failed    7/15/2018  1:56 AM       Failed - Recent (12 mo) or future (30 days) visit within the authorizing provider's specialty    Patient had office visit in the last 12 months or has a visit in the next 30 days with authorizing provider or within the authorizing provider's specialty.  See \"Patient Info\" tab in inbasket, or \"Choose Columns\" in Meds & Orders section of the refill encounter.           Passed - Patient is not a current smoker if age is 35 or older       Passed - No active pregnancy on record       Passed - No positive pregnancy test in past 12 months          "

## 2018-07-16 RX ORDER — ACETAMINOPHEN AND CODEINE PHOSPHATE 120; 12 MG/5ML; MG/5ML
SOLUTION ORAL
Qty: 28 TABLET | Refills: 0 | Status: SHIPPED | OUTPATIENT
Start: 2018-07-16 | End: 2018-08-20

## 2018-08-14 ENCOUNTER — TELEPHONE (OUTPATIENT)
Dept: OBGYN | Facility: CLINIC | Age: 33
End: 2018-08-14

## 2018-08-14 DIAGNOSIS — Z32.01 PREGNANCY TEST POSITIVE: Primary | ICD-10-CM

## 2018-08-14 NOTE — TELEPHONE ENCOUNTER
Pt calls stating she has had positive pregnancy test.  She does not know when her LMP is.  Guesses maybe June.  Type 1 diabetic.  Ordered hcg quant to make sure far enough along to do U/s on Wed after nurse visit.    Sunitha MUÑOZ R.N.  [facility]

## 2018-08-15 DIAGNOSIS — Z30.011 ENCOUNTER FOR INITIAL PRESCRIPTION OF CONTRACEPTIVE PILLS: ICD-10-CM

## 2018-08-15 RX ORDER — ACETAMINOPHEN AND CODEINE PHOSPHATE 120; 12 MG/5ML; MG/5ML
SOLUTION ORAL
Qty: 28 TABLET | Refills: 0 | OUTPATIENT
Start: 2018-08-15

## 2018-08-15 NOTE — TELEPHONE ENCOUNTER
"Requested Prescriptions   Pending Prescriptions Disp Refills     norethindrone (MICRONOR) 0.35 MG per tablet [Pharmacy Med Name: NORETHINDRONE 0.35 MG TABLET] 28 tablet 0    Last Written Prescription Date:  07/16/2018  Last Fill Quantity: 28 tablet,  # refills: 0   Last office visit: 6/5/2017 with prescribing provider:  08/04/2017   Future Office Visit:   Next 5 appointments (look out 90 days)     Aug 22, 2018 11:00 AM CDT   New Prenatal with OX PRENATAL NURSE   [facility]    [address] [address] [phone]                  Sig: TAKE 1 TABLET BY MOUTH EVERY DAY    Contraceptives Protocol Failed    8/15/2018  1:48 AM       Failed - Recent (12 mo) or future (30 days) visit within the authorizing provider's specialty    Patient had office visit in the last 12 months or has a visit in the next 30 days with authorizing provider or within the authorizing provider's specialty.  See \"Patient Info\" tab in inbasket, or \"Choose Columns\" in Meds & Orders section of the refill encounter.           Passed - Patient is not a current smoker if age is 35 or older       Passed - No active pregnancy on record       Passed - No positive pregnancy test in past 12 months          Jad BOWLEST  "

## 2018-08-20 DIAGNOSIS — Z32.01 PREGNANCY TEST POSITIVE: ICD-10-CM

## 2018-08-20 DIAGNOSIS — Z30.011 ENCOUNTER FOR INITIAL PRESCRIPTION OF CONTRACEPTIVE PILLS: ICD-10-CM

## 2018-08-20 LAB — B-HCG SERPL-ACNC: ABNORMAL IU/L (ref 0–5)

## 2018-08-20 PROCEDURE — 36415 COLL VENOUS BLD VENIPUNCTURE: CPT | Performed by: FAMILY MEDICINE

## 2018-08-20 PROCEDURE — 84702 CHORIONIC GONADOTROPIN TEST: CPT | Performed by: FAMILY MEDICINE

## 2018-08-20 RX ORDER — ACETAMINOPHEN AND CODEINE PHOSPHATE 120; 12 MG/5ML; MG/5ML
SOLUTION ORAL
Qty: 28 TABLET | Refills: 0 | OUTPATIENT
Start: 2018-08-20

## 2018-08-20 NOTE — TELEPHONE ENCOUNTER
"Requested Prescriptions   Pending Prescriptions Disp Refills     norethindrone (MICRONOR) 0.35 MG per tablet [Pharmacy Med Name: NORETHINDRONE 0.35 MG TABLET] 28 tablet 0    Last Written Prescription Date:  07/16/2018  Last Fill Quantity: 28 tablet,  # refills: 0   Last office visit: 6/5/2017 with prescribing provider:  08/04/2017   Future Office Visit:   Next 5 appointments (look out 90 days)     Aug 22, 2018 11:00 AM CDT   New Pren[facility] with [facility]X PRENATAL NURSE   [facility]    [address] [address] [phone]                  Sig: TAKE 1 TABLET BY MOUTH EVERY DAY    Contraceptives Protocol Failed    8/20/2018 10:23 AM       Failed - Recent (12 mo) or future (30 days) visit within the authorizing provider's specialty    Patient had office visit in the last 12 months or has a visit in the next 30 days with authorizing provider or within the authorizing provider's specialty.  See \"Patient Info\" tab in inbasket, or \"Choose Columns\" in Meds & Orders section of the refill encounter.           Passed - Patient is not a current smoker if age is 35 or older       Passed - No active pregnancy on record       Passed - No positive pregnancy test in past 12 months          Jad BOWLEST  "

## 2018-08-22 ENCOUNTER — PRENATAL OFFICE VISIT (OUTPATIENT)
Dept: NURSING | Facility: CLINIC | Age: 33
End: 2018-08-22
Payer: COMMERCIAL

## 2018-08-22 VITALS
HEIGHT: 66 IN | SYSTOLIC BLOOD PRESSURE: 112 MMHG | WEIGHT: 165.5 LBS | DIASTOLIC BLOOD PRESSURE: 66 MMHG | BODY MASS INDEX: 26.6 KG/M2

## 2018-08-22 DIAGNOSIS — Z34.90 SUPERVISION OF NORMAL PREGNANCY: Primary | ICD-10-CM

## 2018-08-22 DIAGNOSIS — Z34.90 SUPERVISION OF NORMAL PREGNANCY: ICD-10-CM

## 2018-08-22 LAB
ABO + RH BLD: NORMAL
ABO + RH BLD: NORMAL
BLD GP AB SCN SERPL QL: NORMAL
BLOOD BANK CMNT PATIENT-IMP: NORMAL
ERYTHROCYTE [DISTWIDTH] IN BLOOD BY AUTOMATED COUNT: 12.7 % (ref 10–15)
HCT VFR BLD AUTO: 37.9 % (ref 35–47)
HGB BLD-MCNC: 13.1 G/DL (ref 11.7–15.7)
MCH RBC QN AUTO: 31.7 PG (ref 26.5–33)
MCHC RBC AUTO-ENTMCNC: 34.6 G/DL (ref 31.5–36.5)
MCV RBC AUTO: 92 FL (ref 78–100)
PLATELET # BLD AUTO: 226 10E9/L (ref 150–450)
RBC # BLD AUTO: 4.13 10E12/L (ref 3.8–5.2)
SPECIMEN EXP DATE BLD: NORMAL
WBC # BLD AUTO: 7.9 10E9/L (ref 4–11)

## 2018-08-22 PROCEDURE — 86780 TREPONEMA PALLIDUM: CPT | Performed by: FAMILY MEDICINE

## 2018-08-22 PROCEDURE — 86850 RBC ANTIBODY SCREEN: CPT | Performed by: FAMILY MEDICINE

## 2018-08-22 PROCEDURE — 87340 HEPATITIS B SURFACE AG IA: CPT | Performed by: FAMILY MEDICINE

## 2018-08-22 PROCEDURE — 85027 COMPLETE CBC AUTOMATED: CPT | Performed by: FAMILY MEDICINE

## 2018-08-22 PROCEDURE — 99207 ZZC NO CHARGE NURSE ONLY: CPT

## 2018-08-22 PROCEDURE — 86900 BLOOD TYPING SEROLOGIC ABO: CPT | Performed by: FAMILY MEDICINE

## 2018-08-22 PROCEDURE — 87389 HIV-1 AG W/HIV-1&-2 AB AG IA: CPT | Performed by: FAMILY MEDICINE

## 2018-08-22 PROCEDURE — 86901 BLOOD TYPING SEROLOGIC RH(D): CPT | Performed by: FAMILY MEDICINE

## 2018-08-22 PROCEDURE — 36415 COLL VENOUS BLD VENIPUNCTURE: CPT | Performed by: FAMILY MEDICINE

## 2018-08-22 PROCEDURE — 87086 URINE CULTURE/COLONY COUNT: CPT | Performed by: FAMILY MEDICINE

## 2018-08-22 PROCEDURE — 86762 RUBELLA ANTIBODY: CPT | Performed by: FAMILY MEDICINE

## 2018-08-22 RX ORDER — PRENATAL VIT/IRON FUM/FOLIC AC 27MG-0.8MG
1 TABLET ORAL DAILY
COMMUNITY
End: 2020-03-04

## 2018-08-22 NOTE — LETTER
August 17, 2018      Guerline Rock  [address] [facility] [address]      Dear Guerline Rock    We welcome you to our clinic and appreciate that you have selected us to be your health care provider.   Our goal is to provide you with exceptional care and customer service.  Whether you are visiting our facility for care, calling us, or using our [employer] messaging system, we want to respectfully meet your needs.     To prepare for your upcoming visit please find enclosed medical history forms and registration forms  that need to be completed for your prenatal visit with the nurse on August 22 at 11 am .     Please bring these completed forms with you.    We ask that you arrive 15 minutes prior to your appointment time to complete the registration process.   You can check-in on the first floor in [address].      Before your first visit with the doctor or midwife, you are required to have prenatal lab work done.  You will do this right after your visit with the nurse.  If you do not complete your visit with a nurse, your doctor/midwife appointment will be cancelled and you will need to reschedule this.      Children are not allowed to be in the class (if you have a one on one visit, you can bring your children if necessary, but we prefer that you do not).      Sincerely,      Your OB/GYN Health Team at [facility]

## 2018-08-22 NOTE — PROGRESS NOTES
Pt attended a one-on-one NPN nurse visit.  Pt is , 12w1d GA based off of U/s.    Pt's occupation: realtor  Pt c/o: none  Advised: n/a  Prev hx of : none  Hx of pregnancy complications: type 1 diabetic  Hx of delivery complications: induced x2    Lab Results   Component Value Date    PAP NIL 2016       Hx of abnml pap: no    Went over all information as listed in checklist for 6-12 weeks as well as clinic info and signs/sx to call for.     Sunitha MUÑOZ R.N.  [facility]

## 2018-08-22 NOTE — MR AVS SNAPSHOT
After Visit Summary   8/22/2018    Guerline Rock    MRN: [id number]           Patient Information     Date Of Birth          1985        Visit Information        Provider Department      8/22/2018 11:00 AM  PRENATAL NURSE [facility]        Today's Diagnoses     Supervision of normal pregnancy    -  1       Follow-ups after your visit        Your next 10 appointments already scheduled     Aug 27, 2018  3:45 PM CDT   New Prenatal with Meghan Quinn, DO   [facility] ([facility]    [address] [address] [phone]              Who to contact     If you have questions or need follow up information about today's clinic visit or your schedule please contact [facility] directly at [phone].  Normal or non-critical lab and imaging results will be communicated to you by MyChart, letter or phone within 4 business days after the clinic has received the results. If you do not hear from us within 7 days, please contact the clinic through [employer]art or phone. If you have a critical or abnormal lab result, we will notify you by phone as soon as possible.  Submit refill requests through [employer] or call your pharmacy and they will forward the refill request to us. Please allow 3 business days for your refill to be completed.          Additional Information About Your Visit        MyChart Information     [employer] gives you secure access to your electronic health record. If you see a primary care provider, you can also send messages to your care team and make appointments. If you have questions, please call your primary care clinic.  If you do not have a primary care provider, please call [phone] and they will assist you.        Care EveryWhere ID     This is your Care EveryWhere ID. This could be used by other organizations to access your [place] medical records  [id number]        Your Vitals  "Were     Height Last Period BMI (Body Mass Index)             5' 6\" (1.676 m) (LMP Unknown) 26.71 kg/m2          Blood Pressure from Last 3 Encounters:   08/22/18 112/66   06/05/17 104/70   05/12/17 122/68    Weight from Last 3 Encounters:   08/22/18 165 lb 8 oz (75.1 kg)   06/05/17 145 lb (65.8 kg)   05/12/17 150 lb (68 kg)              We Performed the Following     ABO/Rh type and screen     CBC with platelets     Hepatitis B surface antigen     HIV Antigen Antibody Combo     Rubella Antibody IgG Quantitative     Treponema Abs w Reflex to RPR and Titer     Urine Culture Aerobic Bacterial        Primary Care Provider Fax #    Physician No Ref-Primary [phone]       No address on file        Equal Access to Services     STEFANIE ELIAS : Eyad callahan Socarmen, waaxda luqadaha, qaybta kaalmada adeegyada, george napier . So [facility] [phone].    ATENCIÓN: Si habla español, tiene a baker disposición servicios gratuitos de asistencia lingüística. Llame al [phone].    We comply with applicable federal civil rights laws and [place] laws. We do not discriminate on the basis of race, color, national origin, age, disability, sex, sexual orientation, or gender identity.            Thank you!     Thank you for choosing [facility]  for your care. Our goal is always to provide you with excellent care. Hearing back from our patients is one way we can continue to improve our services. Please take a few minutes to complete the written survey that you may receive in the mail after your visit with us. Thank you!             Your Updated Medication List - Protect others around you: Learn how to safely use, store and throw away your medicines at www.disposemymeds.org.          This list is accurate as of 8/22/18  4:16 PM.  Always use your most recent med list.                   Brand Name Dispense Instructions for use Diagnosis    blood glucose monitoring test strip    " no brand specified    100 strip    Use to test blood sugars 6 times daily or as directed    Type 1 diabetes mellitus without complication (H)       humaLOG 100 UNIT/ML injection   Generic drug:  insulin lispro     3 Month    units before breakfast,  units before lunch,  units before dinner one unit per 15 carbs        prenatal multivitamin plus iron 27-0.8 MG Tabs per tablet      Take 1 tablet by mouth daily

## 2018-08-23 LAB
BACTERIA SPEC CULT: NORMAL
HBV SURFACE AG SERPL QL IA: NONREACTIVE
HIV 1+2 AB+HIV1 P24 AG SERPL QL IA: NONREACTIVE
RUBV IGG SERPL IA-ACNC: 17 IU/ML
SPECIMEN SOURCE: NORMAL
T PALLIDUM AB SER QL: NONREACTIVE

## 2018-08-27 ENCOUNTER — PRENATAL OFFICE VISIT (OUTPATIENT)
Dept: OBGYN | Facility: CLINIC | Age: 33
End: 2018-08-27
Payer: COMMERCIAL

## 2018-08-27 VITALS
SYSTOLIC BLOOD PRESSURE: 100 MMHG | HEIGHT: 66 IN | BODY MASS INDEX: 26.37 KG/M2 | WEIGHT: 164.1 LBS | DIASTOLIC BLOOD PRESSURE: 60 MMHG

## 2018-08-27 DIAGNOSIS — O09.91 HIGH-RISK PREGNANCY IN FIRST TRIMESTER: Primary | ICD-10-CM

## 2018-08-27 DIAGNOSIS — Z87.768 STATUS POST CLUB FOOT CORRECTION AT BIRTH: ICD-10-CM

## 2018-08-27 DIAGNOSIS — E10.9 TYPE 1 DIABETES MELLITUS WITHOUT COMPLICATION (H): ICD-10-CM

## 2018-08-27 PROBLEM — O09.90 HIGH-RISK PREGNANCY: Status: ACTIVE | Noted: 2018-08-27

## 2018-08-27 PROCEDURE — 87591 N.GONORRHOEAE DNA AMP PROB: CPT | Performed by: OBSTETRICS & GYNECOLOGY

## 2018-08-27 PROCEDURE — G0145 SCR C/V CYTO,THINLAYER,RESCR: HCPCS | Performed by: OBSTETRICS & GYNECOLOGY

## 2018-08-27 PROCEDURE — 87624 HPV HI-RISK TYP POOLED RSLT: CPT | Performed by: OBSTETRICS & GYNECOLOGY

## 2018-08-27 PROCEDURE — 99207 ZZC FIRST OB VISIT: CPT | Performed by: OBSTETRICS & GYNECOLOGY

## 2018-08-27 PROCEDURE — 87491 CHLMYD TRACH DNA AMP PROBE: CPT | Performed by: OBSTETRICS & GYNECOLOGY

## 2018-08-27 NOTE — PROGRESS NOTES
"  SUBJECTIVE:  Guerline Rock is an 33 year old G:3 P2 woman who presents for NOB visit        Past Medical History:   Diagnosis Date     Diabetes mellitus type 1 (H) 1/16/2014     Past Surgical History:   Procedure Laterality Date     DISCECTOMY LUMBAR MINIMALLY INVASIVE ONE LEVEL  2010, 2001    L4-L5     EYE SURGERY       FOOT SURGERY       Current Outpatient Prescriptions   Medication     blood glucose monitoring (NO BRAND SPECIFIED) test strip     insulin lispro (HUMALOG VIAL) SOLN 100 UNIT/ML     Prenatal Vit-Fe Fumarate-FA (PRENATAL MULTIVITAMIN PLUS IRON) 27-0.8 MG TABS per tablet     No current facility-administered medications for this visit.      No Known Allergies  Social History   Substance Use Topics     Smoking status: Never Smoker     Smokeless tobacco: Never Used     Alcohol use No       Review of Systems  CONSTITUTIONAL:NEGATIVE  EYES: NEGATIVE  ENT/MOUTH: NEGATIVE  RESP: NEGATIVE  CV: NEGATIVE  GI: NEGATIVE  : NEGATIVE  MUSCULOSKELATAL: NEGATIVE  INTEGUMENTARY/SKIN: NEGATIVE  BREAST: NEGATIVE  NEURO: NEGATIVE  HEME/ALLERGY/IMMUNE: NEGATIVE  PSYCHIATRIC: NEGATIVE    OBJECTIVE:  /60  Ht 5' 6\" (1.676 m)  Wt 164 lb 1.6 oz (74.4 kg)  LMP  (LMP Unknown)  BMI 26.49 kg/m2   EXAM:  GENERAL APPEARANCE: healthy, alert and no distress  BREAST: normal without masses, tenderness or nipple discharge and no palpable axillary masses or adenopathy  ABDOMEN: soft, nontender, without hepatosplenomegaly or masses    Pelvic Exam:  Urethra; negative  Vulva: No external lesions, normal hair distribution, no adenopathy  Vagina: Moist, pink, no abnormal discharge, well rugated, no lesions  Cervix: Pap smear is taken, parous, smooth, pink, no visible lesions  Uterus: 12 weeks size, anteverted, non-tender, mobile  Ovaries: No mass, non-tender, mobile      ASSESSMENT:  IUP at 12 6/7 weeks      -offered First Trimester Screen/Progenity/CF testing    PLAN:  (O09.91) High-risk pregnancy in first trimester  (primary " encounter diagnosis)      (Z98.890,  Z87.76) 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      (E10.9) Type 1 diabetes mellitus without complication (H)          Health Maintenance   Topic Date Due     PNEUMOVAX 1X HI RISK PATIENT < 65 (NO IB MSG)  07/09/1989     EYE EXAM Q1 YEAR  06/29/2016     A1C Q6 MO  11/23/2017     FOOT EXAM Q1 YEAR  05/05/2018     MICROALBUMIN Q1 YEAR  05/05/2018     CREATININE Q1 YEAR  05/23/2018     LIPID MONITORING Q1 YEAR  05/23/2018     PHQ-2 Q1 YR  06/05/2018     MATERNAL SCREENING  09/11/2018     PAP Q3 YR  02/23/2019     INFLUENZA VACCINE (1) 09/01/2018     TSH W/ FREE T4 REFLEX Q2 YEAR  05/23/2019     TETANUS IMMUNIZATION (SYSTEM ASSIGNED)  01/14/2026     HIV SCREEN (SYSTEM ASSIGNED)  Completed

## 2018-08-27 NOTE — NURSING NOTE
"12w6d    Chief Complaint   Patient presents with     Prenatal Care     NPN--pap due--had US 18       Initial /60  Ht 5' 6\" (1.676 m)  Wt 164 lb 1.6 oz (74.4 kg)  LMP  (LMP Unknown)  BMI 26.49 kg/m2 Estimated body mass index is 26.49 kg/(m^2) as calculated from the following:    Height as of this encounter: 5' 6\" (1.676 m).    Weight as of this encounter: 164 lb 1.6 oz (74.4 kg).  BP completed using cuff size: regular        The following HM Due: pap smear           "

## 2018-08-28 ENCOUNTER — PRE VISIT (OUTPATIENT)
Dept: MATERNAL FETAL MEDICINE | Facility: CLINIC | Age: 33
End: 2018-08-28

## 2018-08-28 LAB
C TRACH DNA SPEC QL NAA+PROBE: NEGATIVE
N GONORRHOEA DNA SPEC QL NAA+PROBE: NEGATIVE
SPECIMEN SOURCE: NORMAL
SPECIMEN SOURCE: NORMAL

## 2018-08-29 ENCOUNTER — OFFICE VISIT (OUTPATIENT)
Dept: MATERNAL FETAL MEDICINE | Facility: CLINIC | Age: 33
End: 2018-08-29
Attending: OBSTETRICS & GYNECOLOGY
Payer: COMMERCIAL

## 2018-08-29 ENCOUNTER — HOSPITAL ENCOUNTER (OUTPATIENT)
Dept: ULTRASOUND IMAGING | Facility: CLINIC | Age: 33
Discharge: HOME | End: 2018-08-29
Attending: OBSTETRICS & GYNECOLOGY | Admitting: OBSTETRICS & GYNECOLOGY
Payer: COMMERCIAL

## 2018-08-29 DIAGNOSIS — O26.90 PREGNANCY RELATED CONDITION, ANTEPARTUM: ICD-10-CM

## 2018-08-29 DIAGNOSIS — Z36.9 FIRST TRIMESTER SCREENING: ICD-10-CM

## 2018-08-29 DIAGNOSIS — O24.019 TYPE 1 DIABETES MELLITUS AFFECTING PREGNANCY, ANTEPARTUM: Primary | ICD-10-CM

## 2018-08-29 DIAGNOSIS — Z36.9 FIRST TRIMESTER SCREENING: Primary | ICD-10-CM

## 2018-08-29 LAB
COPATH REPORT: NORMAL
PAP: NORMAL

## 2018-08-29 PROCEDURE — 40000072 ZZH STATISTIC GENETIC COUNSELING, < 16 MIN: Mod: ZF | Performed by: GENETIC COUNSELOR, MS

## 2018-08-29 PROCEDURE — 76813 OB US NUCHAL MEAS 1 GEST: CPT

## 2018-08-29 PROCEDURE — 36415 COLL VENOUS BLD VENIPUNCTURE: CPT | Performed by: OBSTETRICS & GYNECOLOGY

## 2018-08-29 PROCEDURE — 82105 ALPHA-FETOPROTEIN SERUM: CPT | Performed by: OBSTETRICS & GYNECOLOGY

## 2018-08-29 PROCEDURE — 84163 PAPPA SERUM: CPT | Performed by: OBSTETRICS & GYNECOLOGY

## 2018-08-29 PROCEDURE — 84704 HCG FREE BETACHAIN TEST: CPT | Performed by: OBSTETRICS & GYNECOLOGY

## 2018-08-29 NOTE — PROGRESS NOTES
[facility]  Genetic Counseling Consult    Patient:  Guerline Rock YOB: 1985   Date of Service:  18      Guerline Rock was seen with her , Silvestre, at [facility] for genetic consultation to discuss the options for routine screening for fetal chromosome abnormalities.       Impression/Plan:   1.  Guerline had a genetic consultation today.  2. The patient had an ultrasound and blood draw for first trimester screening. Results are expected within 3-5 days, and will be available in [facility]. We will contact her to discuss the results, and a copy will be forwarded to the office of the referring OB provider.     Pregnancy History:   /Parity:    Age at Delivery: 33 year old  CURTIS: 3/5/2019, by Ultrasound  Gestational Age: 13w1d    No significant complications or exposures were reported in the current pregnancy.    Guerline s pregnancy history is significant for two term deliveries.    Medical History:   Guerline was diagnosed with type 1 diabetes in . She had an MFM consult during a previous pregnancy with Dr. De Oliveira. She was aware of the increased risk for congenital heart defects and neural tube defects for this pregnancy.        Family History:   A three-generation pedigree was updated from Guerline's previous genetic counseling visits with Margarita Cesar and Martha Seals during her previous pregnancies, and is scanned under the  Media  tab.   The following significant findings were previously reported by Guerline in her genetic counseling visit with Martha Seals:    Guerline has a family history of hereditary diffuse gastric cancer (HDGC). Her maternal aunt was diagnosed with gastric cancer in her 40s and passed away. A genetic mutation associated with HDGC has been identified in three of Guerline's aunts, and in some cousins, who have pursued prophylactic gastrectomy. Importantly, Guerline reports that her mother had genetic testing, which was  "NEGATIVE. In the case that Guerline's mother is definitely NOT a carrier of the identified HDGC mutation in the family, then Guerline is not at risk. I would be happy to review medical records on Guerline's behalf to confirm this risk assessment.    Guerline's , Silvestre, has a paternal half-brother who has a developmental disability. He completed high school, but cannot live independently. This brother was a premature delivery, which is thought to be the cause of his disability.\" - per Martha Seals.    There were no additional significant findings added to the family history today.     Otherwise, the reported family history is negative for multiple miscarriages, stillbirths, birth defects, mental retardation, known genetic conditions, and consanguinity.         Carrier Screening:   The patient reports that she and the father of the pregnancy have  ancestry:      Cystic fibrosis is an autosomal recessive genetic condition that occurs with increased frequency in individuals of  ancestry and carrier screening for this condition is available.  In addition,  screening in the [facility] includes cystic fibrosis.      Expanded carrier screening for mutations in a large panel of genes associated with autosomal recessive conditions including cystic fibrosis, spinal muscular atrophy, and others, is now available.      Carrier screening was not discussed today.       Risk Assessment:   We explained that the risk for fetal chromosome abnormalities increases with maternal age. We discussed specific features of common chromosome abnormalities, including Down syndrome, trisomy 13, trisomy 18, and sex chromosome trisomies.      - At age 33 at midtrimester, the risk to have a baby with Down syndrome is 1 in 597.    - At age 33 at midtrimester, the risk to have a baby with any chromosome abnormality is 1 in 299.        Testing Options:   We discussed the following options:   First trimester screening    First " trimester ultrasound with nuchal translucency and nasal bone assessments, maternal plasma hCG, ARINA-A, and AFP measurement    Screens for fetal trisomy 21, trisomy 13, and trisomy 18    Cannot screen for open neural tube defects; maternal serum AFP after 15 weeks is recommended       Comprehensive (Level II) ultrasound: Detailed ultrasound performed between 18-22 weeks gestation to screen for major birth defects and markers for aneuploidy. This is recommended for women with a diagnosis of type 1 diabetes.    We discussed that if the first trimester screen or ultrasound were to to be abnormal the following option would be available:      Genetic Amniocentesis    Invasive procedure typically performed in the second trimester by which amniotic fluid is obtained for the purpose of chromosome analysis and/or other prenatal genetic analysis    Diagnostic results; >99% sensitivity for fetal chromosome abnormalities    AFAFP measurement tests for open neural tube defects      We reviewed the benefits and limitations of this testing.  Screening tests provide a risk assessment specific to the pregnancy for certain fetal chromosome abnormalities, but cannot definitively diagnose or exclude a fetal chromosome abnormality.  Follow-up genetic counseling and consideration of diagnostic testing is recommended with any abnormal screening result.     Diagnostic tests carry inherent risks- including risk of miscarriage- that require careful consideration.  These tests can detect fetal chromosome abnormalities with greater than 99% certainty.  Results can be compromised by maternal cell contamination or mosaicism, and are limited by the resolution of cytogenetic G-banding technology.  There is no screening nor diagnostic test that can detect all forms of birth defects or mental disability.     It was a pleasure to be involved with [facility] care. Face-to-face time of the meeting was 20 minutes.      Maribel Teran [facility]  Certified Genetic  Counselor  Phone: [phone]    Addended: Updated the expected range of days in which results will be reported.     Attestation:  Patient seen, evaluated and discussed with the Genetic Counseling Intern. I have verified the content of the note, which accurately reflects my assessment of the patient and the plan of care.  Supervising Genetic Counselor  Rhoda Guzmán MS, [facility]  Maternal Fetal Medicine  [facility]  Phone:[phone]  Email: [email or web address]

## 2018-08-29 NOTE — MR AVS SNAPSHOT
After Visit Summary   8/29/2018    Guerline Rock    MRN: [id number]           Patient Information     Date Of Birth          1985        Visit Information        Provider Department      8/29/2018 3:30 PM Gera Bonner MD [facility] Maternal Fetal Medicine [facility]        Today's Diagnoses     Type 1 diabetes mellitus affecting pregnancy, antepartum    -  1    First trimester screening           Follow-ups after your visit        Your next 10 appointments already scheduled     Sep 27, 2018 11:15 AM CDT   ESTABLISHED PRENATAL with Meghan Quinn,    [facility] ([facility])    [address]  [address]  [facility] [address]   [phone]            Oct 12, 2018  9:30 AM CDT   MFM US COMP with [id number]   [facility] Maternal Fetal Medicine Ultrasound - [facility])    [address]  [facility] [id number] [phone]           Wear comfortable clothes and leave your valuables at home.            Oct 12, 2018 10:00 AM CDT   [facility]   [facility] Maternal Fetal [facility])    [address]  [facility] [id number] [phone]           Please arrive at the time given for your first appointment. This visit is used internally to schedule the physician's time during your ultrasound.            Oct 23, 2018 11:00 AM CDT   ESTABLISHED PRENATAL with Meghan Quinn,    [facility] ([facility])    [address]  [address]  [facility] [address]   [phone]            Nov 02, 2018 12:00 PM CDT   Ech Fetal Complete* with [id number]   [facility] ([facility])    [address]  [facility] [place] [address]                 Future tests that were ordered for you today     Open Future Orders        Priority Expected Expires Ordered    Echo Fetal Complete-Peds Cardiology  Routine 10/31/2018 8/29/2019 8/29/2018    [facility] Comprehensive Single Routine 10/10/2018 6/29/2019 8/29/2018            Who to contact     If you have questions or need follow up information about today's clinic visit or your schedule please contact [facility] directly at [phone].  Normal or non-critical lab and imaging results will be communicated to you by MyChart, letter or phone within 4 business days after the clinic has received the results. If you do not hear from us within 7 days, please contact the clinic through [employer] or phone. If you have a critical or abnormal lab result, we will notify you by phone as soon as possible.  Submit refill requests through [employer] or call your pharmacy and they will forward the refill request to us. Please allow 3 business days for your refill to be completed.          Additional Information About Your Visit        [facility] Medical Information     [employer] gives you secure access to your electronic health record. If you see a primary care provider, you can also send messages to your care team and make appointments. If you have questions, please call your primary care clinic.  If you do not have a primary care provider, please call [phone] and they will assist you.        Care EveryWhere ID     This is your Care EveryWhere ID. This could be used by other organizations to access your [place] medical records  [id number]        Your Vitals Were     Last Period                   (LMP Unknown)            Blood Pressure from Last 3 Encounters:   08/27/18 100/60   08/22/18 112/66   06/05/17 104/70    Weight from Last 3 Encounters:   08/27/18 74.4 kg (164 lb 1.6 oz)   08/22/18 75.1 kg (165 lb 8 oz)   06/05/17 65.8 kg (145 lb)               Primary Care Provider Fax #    Physician No Ref-Primary [phone]       No address on file        Equal Access to Services     STEFANIE ELIAS : Eyad Perry, elen jessica, mikie oakes  george ocronadomaxx sarayvita murryaastacey ah. [place] [facility] [phone].    ATENCIÓN: Si habla angelia, tiene a baker disposición servicios gratuitos de asistencia lingüística. Octavia al [phone].    We comply with applicable federal civil rights laws and [place] laws. We do not discriminate on the basis of race, color, national origin, age, disability, sex, sexual orientation, or gender identity.            Thank you!     Thank you for choosing [facility]  for your care. Our goal is always to provide you with excellent care. Hearing back from our patients is one way we can continue to improve our services. Please take a few minutes to complete the written survey that you may receive in the mail after your visit with us. Thank you!             Your Updated Medication List - Protect others around you: Learn how to safely use, store and throw away your medicines at www.disposemymeds.org.          This list is accurate as of 8/29/18  6:00 PM.  Always use your most recent med list.                   Brand Name Dispense Instructions for use Diagnosis    blood glucose monitoring test strip    no brand specified    100 strip    Use to test blood sugars 6 times daily or as directed    Type 1 diabetes mellitus without complication (H)       humaLOG 100 UNIT/ML injection   Generic drug:  insulin lispro     3 Month    units before breakfast,  units before lunch,  units before dinner one unit per 15 carbs        prenatal multivitamin plus iron 27-0.8 MG Tabs per tablet      Take 1 tablet by mouth daily

## 2018-08-29 NOTE — PROGRESS NOTES
"Please see \"Imaging\" tab under \"Chart Review\" for details of today's ultrasound.    Gera Bonner M.D.  Specialist in Maternal-Fetal Medicine     "

## 2018-08-29 NOTE — MR AVS SNAPSHOT
After Visit Summary   8/29/2018    Guerline Rock    MRN: [id number]           Patient Information     Date Of Birth          1985        Visit Information        Provider Department      8/29/2018 2:15 PM UR GEN COUNSELOR 2 [facility] Maternal Fetal Medicine [facility]        Today's Diagnoses     First trimester screening    -  1    Pregnancy related condition, antepartum           Follow-ups after your visit        Your next 10 appointments already scheduled     Sep 27, 2018 11:15 AM CDT   ESTABLISHED PRENATAL with Meghan Quinn,    [facility]    [address] [place]  [address]  [facility] [address]   [phone]            Oct 12, 2018  9:30 AM CDT   MFM US COMP with RHMFMUSR2   [facility] Maternal Fetal Medicine Ultrasound - [facility])    [address]  [facility] [id number] [phone]           Wear comfortable clothes and leave your valuables at home.            Oct 12, 2018 10:00 AM CDT   Radiology MD with [facility] MD   [facility] Maternal Fetal Medicine [facility])    [address]  [facility] [id number] [phone]           Please arrive at the time given for your first appointment. This visit is used internally to schedule the physician's time during your ultrasound.            Oct 23, 2018 11:00 AM CDT   ESTABLISHED PRENATAL with Meghan Quinn,    [facility]    [address] [place] [address]  [address]  [facility] [address]   [phone]            Nov 02, 2018 12:00 PM CDT   Ech Fetal Complete* with URFETR1   [facility] Echo/EKG ([facility])    [address]  [place] [address]                 Future tests that were ordered for you today     Open Future Orders        Priority Expected Expires Ordered    Echo Fetal Complete-Peds Cardiology Routine 10/31/2018  8/29/2019 8/29/2018    [facility] US Comprehensive Single Routine 10/10/2018 6/29/2019 8/29/2018            Who to contact     If you have questions or need follow up information about today's clinic visit or your schedule please contact [facility] MATERNAL FETAL MEDICINE [facility] directly at [phone].  Normal or non-critical lab and imaging results will be communicated to you by MyChart, letter or phone within 4 business days after the clinic has received the results. If you do not hear from us within 7 days, please contact the clinic through StarNet Interactivehart or phone. If you have a critical or abnormal lab result, we will notify you by phone as soon as possible.  Submit refill requests through [email or web address] or call your pharmacy and they will forward the refill request to us. Please allow 3 business days for your refill to be completed.          Additional Information About Your Visit        StarNet Interactivehart Information     [email or web address] gives you secure access to your electronic health record. If you see a primary care provider, you can also send messages to your care team and make appointments. If you have questions, please call your primary care clinic.  If you do not have a primary care provider, please call [phone] and they will assist you.        Care EveryWhere ID     This is your Care EveryWhere ID. This could be used by other organizations to access your [place] medical records  [id number]        Your Vitals Were     Last Period                   (LMP Unknown)            Blood Pressure from Last 3 Encounters:   08/27/18 100/60   08/22/18 112/66   06/05/17 104/70    Weight from Last 3 Encounters:   08/27/18 74.4 kg (164 lb 1.6 oz)   08/22/18 75.1 kg (165 lb 8 oz)   06/05/17 65.8 kg (145 lb)              We Performed the Following     [facility] Genetic Counseling        Primary Care Provider Fax #    Physician No Ref-Primary [phone]       No address on file        Equal Access to Services     STEFANIE GROSS: Eyad callahan  [place], elen retanahortensiaha, mikie kabhavesh coronado, george lcin hayaan lashaymaxx sarayvita lakirtstacey carole. So [facility] [phone].    ATENCIÓN: Si erick galan, tiene a baker disposición servicios gratuitos de asistencia lingüística. Octavia al [phone].    We comply with applicable federal civil rights laws and [place] laws. We do not discriminate on the basis of race, color, national origin, age, disability, sex, sexual orientation, or gender identity.            Thank you!     Thank you for choosing [facility]  for your care. Our goal is always to provide you with excellent care. Hearing back from our patients is one way we can continue to improve our services. Please take a few minutes to complete the written survey that you may receive in the mail after your visit with us. Thank you!             Your Updated Medication List - Protect others around you: Learn how to safely use, store and throw away your medicines at www.disposemymeds.org.          This list is accurate as of 8/29/18  5:09 PM.  Always use your most recent med list.                   Brand Name Dispense Instructions for use Diagnosis    blood glucose monitoring test strip    no brand specified    100 strip    Use to test blood sugars 6 times daily or as directed    Type 1 diabetes mellitus without complication (H)       humaLOG 100 UNIT/ML injection   Generic drug:  insulin lispro     3 Month    units before breakfast,  units before lunch,  units before dinner one unit per 15 carbs        prenatal multivitamin plus iron 27-0.8 MG Tabs per tablet      Take 1 tablet by mouth daily

## 2018-08-31 ENCOUNTER — TELEPHONE (OUTPATIENT)
Dept: MATERNAL FETAL MEDICINE | Facility: CLINIC | Age: 33
End: 2018-08-31

## 2018-08-31 LAB
FINAL DIAGNOSIS: NORMAL
HPV HR 12 DNA CVX QL NAA+PROBE: NEGATIVE
HPV16 DNA SPEC QL NAA+PROBE: NEGATIVE
HPV18 DNA SPEC QL NAA+PROBE: NEGATIVE
LAB SCANNED RESULT: NORMAL
SPECIMEN DESCRIPTION: NORMAL
SPECIMEN SOURCE CVX/VAG CYTO: NORMAL

## 2018-08-31 NOTE — TELEPHONE ENCOUNTER
Left a message for Guerline and she called me back. We discussed her first trimester screening results.     These results indicated a 1 in 10,000 risk for Down syndrome and a 1 in 10,000 risk for trisomy 18/13.     The NT measurement was 2 mm, the nasal bone was present, the ARINA-A was 1.19 MoM and the free BhCG was 1.24 MoM.    This screening test gives information about the risk of some chromosome conditions in a pregnancy, but does not definitively diagnose or exclude the presence of these chromosome conditions.     A copy of these results are available in her EPIC chart for her primary OB to review.      Maternal serum AFP only is recommended in the second trimester to screen for neural tube defects.

## 2018-09-27 ENCOUNTER — PRENATAL OFFICE VISIT (OUTPATIENT)
Dept: OBGYN | Facility: CLINIC | Age: 33
End: 2018-09-27
Payer: COMMERCIAL

## 2018-09-27 VITALS
WEIGHT: 169.8 LBS | DIASTOLIC BLOOD PRESSURE: 58 MMHG | BODY MASS INDEX: 27.29 KG/M2 | HEIGHT: 66 IN | SYSTOLIC BLOOD PRESSURE: 106 MMHG

## 2018-09-27 DIAGNOSIS — H10.029 PINK EYE DISEASE, UNSPECIFIED LATERALITY: ICD-10-CM

## 2018-09-27 DIAGNOSIS — Z34.92 PRENATAL CARE IN SECOND TRIMESTER: Primary | ICD-10-CM

## 2018-09-27 DIAGNOSIS — E10.8 TYPE 1 DIABETES MELLITUS WITH COMPLICATION (H): ICD-10-CM

## 2018-09-27 PROCEDURE — 99207 ZZC PRENATAL VISIT: CPT | Performed by: FAMILY MEDICINE

## 2018-09-27 RX ORDER — OFLOXACIN 3 MG/ML
1 SOLUTION/ DROPS OPHTHALMIC EVERY 4 HOURS
Qty: 1 BOTTLE | Refills: 3 | Status: ON HOLD | OUTPATIENT
Start: 2018-09-27 | End: 2019-02-08

## 2018-09-27 NOTE — NURSING NOTE
"17w2d    Chief Complaint   Patient presents with     Prenatal Care     daughter had pink eye--wondering if she can get eye drops       Initial /58  Ht 5' 6\" (1.676 m)  Wt 169 lb 12.8 oz (77 kg)  LMP  (LMP Unknown)  BMI 27.41 kg/m2 Estimated body mass index is 27.41 kg/(m^2) as calculated from the following:    Height as of this encounter: 5' 6\" (1.676 m).    Weight as of this encounter: 169 lb 12.8 oz (77 kg).  BP completed using cuff size: regular        The following HM Due: NONE           "

## 2018-09-27 NOTE — MR AVS SNAPSHOT
After Visit Summary   9/27/2018    Guerline Rock    MRN: [id number]           Patient Information     Date Of Birth          1985        Visit Information        Provider Department      9/27/2018 11:15 AM Meghan Quinn,  [facility]        Today's Diagnoses     Prenatal care in second trimester    -  1    Type 1 diabetes mellitus with complication (H)        Pink eye disease, unspecified laterality          Care Instructions    Return to endocrinology   See MFM as planned     Dr. Meghan Quinn, DO    Obstetrics and Gynecology  [facility] and [place]                 Follow-ups after your visit        Your next 10 appointments already scheduled     Oct 12, 2018  9:30 AM CDT   MFM US COMP with MFMUSR2   [facility] Maternal Fetal Medicine Ultrasound - [facility])    [address]  [facility] [address] [phone]           Wear comfortable clothes and leave your valuables at home.            Oct 12, 2018 10:00 AM CDT   [facility]   [facility] Maternal Fetal Medicine - [facility])    [address]  [facility] [address] [phone]           Please arrive at the time given for your first appointment. This visit is used internally to schedule the physician's time during your ultrasound.            Oct 23, 2018 11:00 AM CDT   ESTABLISHED PRENATAL with Meghan Quinn,    [facility] ([facility])    [address] [place]  [address]  [facility] [address]   [phone]            Nov 02, 2018 12:00 PM CDT   Ech Fetal Complete* with URFETR1   [facility] Echo/EKG ([facility])    [address] [address]                 Who to contact     If you have questions or need follow up information about today's clinic visit or your schedule please contact [facility]  "directly at [phone].  Normal or non-critical lab and imaging results will be communicated to you by All Prote[employer] Agencyhart, letter or phone within 4 business days after the clinic has received the results. If you do not hear from us within 7 days, please contact the clinic through Yordert or phone. If you have a critical or abnormal lab result, we will notify you by phone as soon as possible.  Submit refill requests through Nifti or call your pharmacy and they will forward the refill request to us. Please allow 3 business days for your refill to be completed.          Additional Information About Your Visit        [employer] Information     Nifti gives you secure access to your electronic health record. If you see a primary care provider, you can also send messages to your care team and make appointments. If you have questions, please call your primary care clinic.  If you do not have a primary care provider, please call [phone] and they will assist you.        Care EveryWhere ID     This is your Care EveryWhere ID. This could be used by other organizations to access your [place] medical records  [id number]        Your Vitals Were     Height Last Period BMI (Body Mass Index)             5' 6\" (1.676 m) (LMP Unknown) 27.41 kg/m2          Blood Pressure from Last 3 Encounters:   09/27/18 106/58   08/27/18 100/60   08/22/18 112/66    Weight from Last 3 Encounters:   09/27/18 169 lb 12.8 oz (77 kg)   08/27/18 164 lb 1.6 oz (74.4 kg)   08/22/18 165 lb 8 oz (75.1 kg)              We Performed the Following     ALT     AST     CBC with platelets differential     Creatinine     Protein  random urine with Creat Ratio     Urea nitrogen     Uric acid          Today's Medication Changes          These changes are accurate as of 9/27/18 11:44 AM.  If you have any questions, ask your nurse or doctor.               Start taking these medicines.        Dose/Directions    ofloxacin 0.3 % ophthalmic solution   Commonly known as:  " OCUFLOX   Used for:  Pink eye disease, unspecified laterality   Started by:  Meghan Quinn DO        Dose:  1 drop   Apply 1 drop to eye every 4 hours   Quantity:  1 Bottle   Refills:  3            Where to get your medicines      These medications were sent to [facility] [address] [place] [facility] [address] [id number] [facility] [address]    Hours:  Tech issues with their phone system Phone:  [phone]     ofloxacin 0.3 % ophthalmic solution                Primary Care Provider Fax #    Physician No Ref-Primary [phone]       No address on file        Equal Access to Services     [facility]: Hadii gianni webber hadasho Soomaali, waaxda luqadaha, qaybta kaalmada ademaxxyakevin, george napier . So [facility] [phone].    ATENCIÓN: Si habla español, tiene a baker disposición servicios gratuitos de asistencia lingüística. [facility] [phone].    We comply with applicable federal civil rights laws and [place] laws. We do not discriminate on the basis of race, color, national origin, age, disability, sex, sexual orientation, or gender identity.            Thank you!     Thank you for choosing [facility]  for your care. Our goal is always to provide you with excellent care. Hearing back from our patients is one way we can continue to improve our services. Please take a few minutes to complete the written survey that you may receive in the mail after your visit with us. Thank you!             Your Updated Medication List - Protect others around you: Learn how to safely use, store and throw away your medicines at www.disposemymeds.org.          This list is accurate as of 9/27/18 11:44 AM.  Always use your most recent med list.                   Brand Name Dispense Instructions for use Diagnosis    blood glucose monitoring test strip    no brand specified    100 strip    Use to test blood sugars 6 times daily or as directed    Type 1 diabetes mellitus  without complication (H)       humaLOG 100 UNIT/ML injection   Generic drug:  insulin lispro     3 Month    units before breakfast,  units before lunch,  units before dinner one unit per 15 carbs        ofloxacin 0.3 % ophthalmic solution    OCUFLOX    1 Bottle    Apply 1 drop to eye every 4 hours    Pink eye disease, unspecified laterality       prenatal multivitamin plus iron 27-0.8 MG Tabs per tablet      Take 1 tablet by mouth daily

## 2018-09-27 NOTE — PROGRESS NOTES
"CC: Here for routine prenatal visit   33 year old y/o  @ 17w2d with Estimated Date of Delivery: Mar 5, 2019   HPI: Pt is doing well, will schedule an appointment with endocrinology to monitor Type I DM & has already scheduled an appointment next month with MFM. Pt reports possible pink eye, would like medicated eye drops to treat symptoms. She believes she got it from her daughter, who was recently diagnosed & began treatment.         This document serves as a record of the services and decisions personally performed and made by Meghan Quinn DO. It was created on her behalf by Genet Ramesh, a trained medical scribe. The creation of this document is based the provider's statements to the medical scribe.  Scribe Genet Ramesh 11:31 AM, 2018    /58  Ht 1.676 m (5' 6\")  Wt 77 kg (169 lb 12.8 oz)  LMP  (LMP Unknown)  BMI 27.41 kg/m2  See OB flowsheet  + fetal movement, no contractions, no bleeding, no loss of fluid   Discussed monitoring fetal movement - informed typically begin noticing @ 18-20w      1) concerns: Conjunctivitis -- Antibiotic eye drops given to treat symptoms  2) Routine care: Desires first trimester screening; GC - negative; Pap - normal  3) Type I Diabetes: Insulin - 15u at night & 1u/15 carbs at meals; Labs ordered, will be monitored by endocrinology & MFM   - Growth US & Twice Weekly BPP's @ 36w   - Induction @ 37w   -MFM consult, EKG in [place], ophthamology appointment needed, PIH labs and random protein today.  4) Return: 4 weeks        Rx:  - ofloxacin 0.3% ophthalmic solution, Apply 1 drop to eye every 4 hours        The information in this document, created by the medical scribe for me, accurately reflects the services I personally performed and the decisions made by me. I have reviewed and approved this document for accuracy prior to leaving the patient care area.  11:31 AM, 18    Kai    "

## 2018-09-27 NOTE — PATIENT INSTRUCTIONS
Return to endocrinology   See MFM as planned     Dr. Meghan Quinn, DO    Obstetrics and Gynecology  [facility] [place] and [place]

## 2018-10-08 ENCOUNTER — MEDICAL CORRESPONDENCE (OUTPATIENT)
Dept: HEALTH INFORMATION MANAGEMENT | Facility: CLINIC | Age: 33
End: 2018-10-08

## 2018-10-12 ENCOUNTER — TRANSFERRED RECORDS (OUTPATIENT)
Dept: HEALTH INFORMATION MANAGEMENT | Facility: CLINIC | Age: 33
End: 2018-10-12

## 2018-10-12 ENCOUNTER — OFFICE VISIT (OUTPATIENT)
Dept: MATERNAL FETAL MEDICINE | Facility: CLINIC | Age: 33
End: 2018-10-12
Attending: OBSTETRICS & GYNECOLOGY
Payer: COMMERCIAL

## 2018-10-12 ENCOUNTER — HOSPITAL ENCOUNTER (OUTPATIENT)
Dept: ULTRASOUND IMAGING | Facility: CLINIC | Age: 33
Discharge: HOME | End: 2018-10-12
Attending: OBSTETRICS & GYNECOLOGY | Admitting: OBSTETRICS & GYNECOLOGY
Payer: COMMERCIAL

## 2018-10-12 DIAGNOSIS — O24.019 TYPE 1 DIABETES MELLITUS AFFECTING PREGNANCY, ANTEPARTUM: Primary | ICD-10-CM

## 2018-10-12 DIAGNOSIS — O24.019 TYPE 1 DIABETES MELLITUS AFFECTING PREGNANCY, ANTEPARTUM: ICD-10-CM

## 2018-10-12 LAB
ALT SERPL-CCNC: 14 U/L (ref 6–29)
CHOLEST SERPL-MCNC: 187 MG/DL
CREAT SERPL-MCNC: 0.52 MG/DL (ref 0.5–1.1)
GFR SERPL CREATININE-BSD FRML MDRD: 127 ML/MIN/1.73M2
GLUCOSE SERPL-MCNC: 191 MG/DL (ref 65–99)
HBA1C MFR BLD: 6.2 % (ref 4–6)
HDLC SERPL-MCNC: 54 MG/DL
LDLC SERPL CALC-MCNC: 107 MG/DL
NONHDLC SERPL-MCNC: 133 MG/DL
TRIGL SERPL-MCNC: 145 MG/DL
TSH SERPL-ACNC: 0.84 UIU/ML (ref 0.3–5)

## 2018-10-12 PROCEDURE — 76811 OB US DETAILED SNGL FETUS: CPT

## 2018-10-12 NOTE — MR AVS SNAPSHOT
After Visit Summary   10/12/2018    Guerline Rock    MRN: [id number]           Patient Information     Date Of Birth          1985        Visit Information        Provider Department      10/12/2018 10:00 AM Stef Thomas MD [facility] Maternal Fetal Medicine [facility]        Today's Diagnoses     Type 1 diabetes mellitus affecting pregnancy, antepartum    -  1       Follow-ups after your visit        Your next 10 appointments already scheduled     Oct 23, 2018 11:00 AM CDT   ESTABLISHED PRENATAL with Meghan Quinn DO   [facility]    [address]  [address]  [facility] [address]   [phone]            Nov 02, 2018 12:00 PM CDT   Ech Fetal Complete* with URFETR1   [facility] ([facility])    [address] [facility] [address]               Nov 13, 2018  9:30 AM CST   MFM US COMPRE SINGLE F/U with RHMFMUSR2   [facility] Maternal Fetal Medicine Ultrasound - [facility])    [address]  [facility] [id number] [phone]           Wear comfortable clothes and leave your valuables at home.            Nov 13, 2018 10:00 AM CST   Radiology MD with  MFM MD   [facility] Maternal Fetal Medicine [facility])    [address]  [facility] [id number] [phone]           Please arrive at the time given for your first appointment. This visit is used internally to schedule the physician's time during your ultrasound.            Nov 19, 2018 10:30 AM CST   ESTABLISHED PRENATAL with Meghan Quinn DO   [facility] ([facility])    [address] [facility] [address] [phone]            Dec 18, 2018  1:15 PM CST   ESTABLISHED PRENATAL with Meghan Quinn DO   [facility] ([facility])    [address] [facility] [address]    [phone]              Future tests that were ordered for you today     Open Future Orders        Priority Expected Expires Ordered    MFM  Comprehensive Single F/U Routine  10/12/2019 10/12/2018            Who to contact     If you have questions or need follow up information about today's clinic visit or your schedule please contact [place] MATERNAL FETAL MEDICINE [facility] directly at [phone].  Normal or non-critical lab and imaging results will be communicated to you by PlumTVhart, letter or phone within 4 business days after the clinic has received the results. If you do not hear from us within 7 days, please contact the clinic through [employer] or phone. If you have a critical or abnormal lab result, we will notify you by phone as soon as possible.  Submit refill requests through Visualnet or call your pharmacy and they will forward the refill request to us. Please allow 3 business days for your refill to be completed.          Additional Information About Your Visit        PlumTVhart Information     Visualnet gives you secure access to your electronic health record. If you see a primary care provider, you can also send messages to your care team and make appointments. If you have questions, please call your primary care clinic.  If you do not have a primary care provider, please call [phone] and they will assist you.        Care EveryWhere ID     This is your Care EveryWhere ID. This could be used by other organizations to access your [place] medical records  [id number]        Your Vitals Were     Last Period                   (LMP Unknown)            Blood Pressure from Last 3 Encounters:   09/27/18 106/58   08/27/18 100/60   08/22/18 112/66    Weight from Last 3 Encounters:   09/27/18 77 kg (169 lb 12.8 oz)   08/27/18 74.4 kg (164 lb 1.6 oz)   08/22/18 75.1 kg (165 lb 8 oz)               Primary Care Provider Fax #    Physician No Ref-Primary [phone]       No address on file        Equal Access  to Services     STEFANIE ELIAS : Eyad Perry, elen jessica, qageorge hightower. So [facility] [phone].    ATENCIÓN: Si habla angelia, tiene a baker disposición servicios gratuitos de asistencia lingüística. Llame al [phone].    We comply with applicable federal civil rights laws and [place] laws. We do not discriminate on the basis of race, color, national origin, age, disability, sex, sexual orientation, or gender identity.            Thank you!     Thank you for choosing [facility]  for your care. Our goal is always to provide you with excellent care. Hearing back from our patients is one way we can continue to improve our services. Please take a few minutes to complete the written survey that you may receive in the mail after your visit with us. Thank you!             Your Updated Medication List - Protect others around you: Learn how to safely use, store and throw away your medicines at www.disposemymeds.org.          This list is accurate as of 10/12/18 10:55 AM.  Always use your most recent med list.                   Brand Name Dispense Instructions for use Diagnosis    blood glucose monitoring test strip    no brand specified    100 strip    Use to test blood sugars 6 times daily or as directed    Type 1 diabetes mellitus without complication (H)       humaLOG 100 UNIT/ML injection   Generic drug:  insulin lispro     3 Month    units before breakfast,  units before lunch,  units before dinner one unit per 15 carbs        ofloxacin 0.3 % ophthalmic solution    OCUFLOX    1 Bottle    Apply 1 drop to eye every 4 hours    Pink eye disease, unspecified laterality       prenatal multivitamin plus iron 27-0.8 MG Tabs per tablet      Take 1 tablet by mouth daily

## 2018-10-12 NOTE — PROGRESS NOTES
"Please see \"Imaging\" tab under \"Chart Review\" for details of today's visit.    Stef Thomas    "

## 2018-10-16 ENCOUNTER — OFFICE VISIT (OUTPATIENT)
Dept: FAMILY MEDICINE | Facility: CLINIC | Age: 33
End: 2018-10-16
Payer: COMMERCIAL

## 2018-10-16 VITALS
HEIGHT: 66 IN | DIASTOLIC BLOOD PRESSURE: 64 MMHG | RESPIRATION RATE: 16 BRPM | WEIGHT: 174.5 LBS | BODY MASS INDEX: 28.04 KG/M2 | TEMPERATURE: 98.6 F | OXYGEN SATURATION: 96 % | SYSTOLIC BLOOD PRESSURE: 108 MMHG | HEART RATE: 88 BPM

## 2018-10-16 DIAGNOSIS — J06.9 VIRAL URI: Primary | ICD-10-CM

## 2018-10-16 DIAGNOSIS — R50.9 FEVER, UNSPECIFIED FEVER CAUSE: ICD-10-CM

## 2018-10-16 LAB
FLUAV+FLUBV AG SPEC QL: NEGATIVE
FLUAV+FLUBV AG SPEC QL: NEGATIVE
SPECIMEN SOURCE: NORMAL

## 2018-10-16 PROCEDURE — 99213 OFFICE O/P EST LOW 20 MIN: CPT | Performed by: FAMILY MEDICINE

## 2018-10-16 PROCEDURE — 87804 INFLUENZA ASSAY W/OPTIC: CPT | Performed by: FAMILY MEDICINE

## 2018-10-16 NOTE — MR AVS SNAPSHOT
After Visit Summary   10/16/2018    Guerline Rock    MRN: [id number]           Patient Information     Date Of Birth          1985        Visit Information        Provider Department      10/16/2018 2:20 PM Carlin Pichardo MD [facility]        Today's Diagnoses     Viral URI    -  1    Fever, unspecified fever cause           Follow-ups after your visit        Follow-up notes from your care team     Return in about 1 week (around 10/23/2018) for if not improving or sooner if worsening symptoms.      Your next 10 appointments already scheduled     Oct 23, 2018 11:00 AM CDT   ESTABLISHED PRENATAL with Meghan Quinn DO   [facility] ([facility])    [address]  [address]  [facility] [id number] [phone]            Nov 02, 2018 12:00 PM CDT   Ech Fetal Complete* with URFETR1   [facility] Echo/EKG ([facility])    [address] [facility] [address]               Nov 13, 2018  9:30 AM CST   MFM US COMPRE SINGLE F/U with RHMFMUSR2   [facility] Maternal Fetal Medicine Ultrasound - [facility])    [address]  [facility] [address] [phone]           Wear comfortable clothes and leave your valuables at home.            Nov 13, 2018 10:00 AM CST   Radiology MD with  MFM MD   [facility] Maternal Fetal Medicine [facility])    [address]  [facility] [address] [phone]           Please arrive at the time given for your first appointment. This visit is used internally to schedule the physician's time during your ultrasound.            Nov 19, 2018 10:30 AM CST   ESTABLISHED PRENATAL with Meghan Quinn DO   [facility] ([facility])    [id number] [place] [id number] [phone]            Dec 18, 2018  1:15 PM CST   ESTABLISHED PRENATAL with Meghan Campa  "Kai,    [facility] ([facility])    [address] [facility] [address] [phone]              Who to contact     If you have questions or need follow up information about today's clinic visit or your schedule please contact [facility] directly at [phone].  Normal or non-critical lab and imaging results will be communicated to you by MyChart, letter or phone within 4 business days after the clinic has received the results. If you do not hear from us within 7 days, please contact the clinic through [employer] or phone. If you have a critical or abnormal lab result, we will notify you by phone as soon as possible.  Submit refill requests through [employer] or call your pharmacy and they will forward the refill request to us. Please allow 3 business days for your refill to be completed.          Additional Information About Your Visit        [employer] Information     [employer] gives you secure access to your electronic health record. If you see a primary care provider, you can also send messages to your care team and make appointments. If you have questions, please call your primary care clinic.  If you do not have a primary care provider, please call [phone] and they will assist you.        Care EveryWhere ID     This is your Care EveryWhere ID. This could be used by other organizations to access your [place] medical records  [id number]        Your Vitals Were     Pulse Temperature Respirations Height Last Period Pulse Oximetry    88 98.6  F (37  C) (Oral) 16 5' 6\" (1.676 m) (LMP Unknown) 96%    BMI (Body Mass Index)                   28.17 kg/m2            Blood Pressure from Last 3 Encounters:   10/16/18 108/64   09/27/18 106/58   08/27/18 100/60    Weight from Last 3 Encounters:   10/16/18 174 lb 8 oz (79.2 kg)   09/27/18 169 lb 12.8 oz (77 kg)   08/27/18 164 lb 1.6 oz (74.4 kg)              We Performed the Following     Influenza A/B antigen  "       Primary Care Provider Fax #    Physician No Ref-Primary [phone]       No address on file        Equal Access to Services     STEFANIE ELIAS : Hadii aad ku hadseverinomaureen Orlando, devangda laineymigdalia, mikie coronado, george lcin hayaastacey metzmaxx ramos laSalliealexandrea lam. So [facility] [phone].    ATENCIÓN: Si habla español, tiene a baker disposición servicios gratuitos de asistencia lingüística. Llame al [phone].    We comply with applicable federal civil rights laws and [place] laws. We do not discriminate on the basis of race, color, national origin, age, disability, sex, sexual orientation, or gender identity.            Thank you!     Thank you for choosing [facility]  for your care. Our goal is always to provide you with excellent care. Hearing back from our patients is one way we can continue to improve our services. Please take a few minutes to complete the written survey that you may receive in the mail after your visit with us. Thank you!             Your Updated Medication List - Protect others around you: Learn how to safely use, store and throw away your medicines at www.disposemymeds.org.          This list is accurate as of 10/16/18  4:59 PM.  Always use your most recent med list.                   Brand Name Dispense Instructions for use Diagnosis    blood glucose monitoring test strip    no brand specified    100 strip    Use to test blood sugars 6 times daily or as directed    Type 1 diabetes mellitus without complication (H)       humaLOG 100 UNIT/ML injection   Generic drug:  insulin lispro     3 Month    units before breakfast,  units before lunch,  units before dinner one unit per 15 carbs        ofloxacin 0.3 % ophthalmic solution    OCUFLOX    1 Bottle    Apply 1 drop to eye every 4 hours    Pink eye disease, unspecified laterality       prenatal multivitamin plus iron 27-0.8 MG Tabs per tablet      Take 1 tablet by mouth daily

## 2018-10-16 NOTE — PROGRESS NOTES
SUBJECTIVE:   Guerline Rock is a 33 year old female who presents to clinic today for the following health issues:    Patient reports a cough for the past four days. She also reports fatigue, body aches, sore throat and mild sinus pressure. Patient is currently pregnant. Her daughter was recently diagnosed with possible viral pneumonia. Denies fever, thick or bloody nasal drainage.     Problem list and histories reviewed & adjusted, as indicated.  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     S/P lumbar discectomy     Type 1 diabetes mellitus without complication (H)     Group B Streptococcus carrier, +RV culture, currently pregnant     High-risk pregnancy     Past Surgical History:   Procedure Laterality Date     DISCECTOMY LUMBAR MINIMALLY INVASIVE ONE LEVEL  2010, 2001    L4-L5     EYE SURGERY       FOOT SURGERY         Social History   Substance Use Topics     Smoking status: Never Smoker     Smokeless tobacco: Never Used     Alcohol use No     Family History   Problem Relation Age of Onset     Family History Negative Mother      Family History Negative Father            Reviewed and updated as needed this visit by clinical staff  Tobacco  Allergies  Meds  Med Hx  Surg Hx  Fam Hx  Soc Hx      Reviewed and updated as needed this visit by Provider         ROS:  CONSTITUTIONAL: as noted above   ENT/MOUTH: as noted above   RESP: as noted above     This document serves as a record of the services and decisions personally performed and made by Carlin Pichardo MD. It was created on his behalf by Nel Mclaughlin, a trained medical scribe. The creation of this document is based on the provider's statements to the medical scribe.  Nel Mclaughlin 2:40 PM October 16, 2018    OBJECTIVE:     /64 (BP Location: Right arm, Patient Position: Chair, Cuff Size: Adult Regular)  Pulse 88  Temp 98.6  F (37  C) (Oral)  Resp 16  Ht 1.676 m (5'  "6\")  Wt 79.2 kg (174 lb 8 oz)  LMP  (LMP Unknown)  SpO2 96%  BMI 28.17 kg/m2  Body mass index is 28.17 kg/(m^2).  GENERAL: healthy, alert and no distress  EYES: Eyes grossly normal to inspection, PERRL and conjunctivae and sclerae normal  HENT: ear canals and TM's normal, nose and mouth without ulcers or lesions  NECK: no adenopathy, no asymmetry, masses, or scars  RESP: lungs clear to auscultation - no rales, rhonchi or wheezes  CV: regular rate and rhythm, normal S1 S2, no S3 or S4, no murmur, click or rub, no peripheral edema and peripheral pulses strong    Diagnostic Test Results:  Results for orders placed or performed in visit on 10/16/18 (from the past 24 hour(s))   Influenza A/B antigen   Result Value Ref Range    Influenza A/B Agn Specimen Nasal     Influenza A Negative NEG^Negative    Influenza B Negative NEG^Negative     ASSESSMENT/PLAN:     1. Viral URI  Symptomatic management for viral upper respiratory illness.  Acetaminophen for pain or fever.  Return if worsening.    2. Fever, unspecified fever cause  - Influenza A/B antigen    The information in this document, created by the medical scribe for me, accurately reflects the services I personally performed and the decisions made by me. I have reviewed and approved this document for accuracy prior to leaving the patient care area.  October 16, 2018 3:24 PM    Carlin Pichardo MD  [facility]    "

## 2018-10-23 ENCOUNTER — PRENATAL OFFICE VISIT (OUTPATIENT)
Dept: OBGYN | Facility: CLINIC | Age: 33
End: 2018-10-23
Payer: COMMERCIAL

## 2018-10-23 VITALS
WEIGHT: 176.3 LBS | DIASTOLIC BLOOD PRESSURE: 60 MMHG | BODY MASS INDEX: 28.33 KG/M2 | HEIGHT: 66 IN | SYSTOLIC BLOOD PRESSURE: 110 MMHG

## 2018-10-23 DIAGNOSIS — O24.912 DIABETES MELLITUS COMPLICATING PREGNANCY, SECOND TRIMESTER: ICD-10-CM

## 2018-10-23 DIAGNOSIS — J06.9 UPPER RESPIRATORY TRACT INFECTION, UNSPECIFIED TYPE: Primary | ICD-10-CM

## 2018-10-23 LAB
BASOPHILS # BLD AUTO: 0 10E9/L (ref 0–0.2)
BASOPHILS NFR BLD AUTO: 0.1 %
CREAT UR-MCNC: 57 MG/DL
DIFFERENTIAL METHOD BLD: ABNORMAL
EOSINOPHIL # BLD AUTO: 0 10E9/L (ref 0–0.7)
EOSINOPHIL NFR BLD AUTO: 0.5 %
ERYTHROCYTE [DISTWIDTH] IN BLOOD BY AUTOMATED COUNT: 12.7 % (ref 10–15)
HCT VFR BLD AUTO: 34.2 % (ref 35–47)
HGB BLD-MCNC: 11.7 G/DL (ref 11.7–15.7)
LYMPHOCYTES # BLD AUTO: 1.3 10E9/L (ref 0.8–5.3)
LYMPHOCYTES NFR BLD AUTO: 16 %
MCH RBC QN AUTO: 31.5 PG (ref 26.5–33)
MCHC RBC AUTO-ENTMCNC: 34.2 G/DL (ref 31.5–36.5)
MCV RBC AUTO: 92 FL (ref 78–100)
MONOCYTES # BLD AUTO: 0.7 10E9/L (ref 0–1.3)
MONOCYTES NFR BLD AUTO: 8.3 %
NEUTROPHILS # BLD AUTO: 6 10E9/L (ref 1.6–8.3)
NEUTROPHILS NFR BLD AUTO: 75.1 %
PLATELET # BLD AUTO: 230 10E9/L (ref 150–450)
PROT UR-MCNC: 0.08 G/L
PROT/CREAT 24H UR: 0.15 G/G CR (ref 0–0.2)
RBC # BLD AUTO: 3.71 10E12/L (ref 3.8–5.2)
WBC # BLD AUTO: 7.6 10E9/L (ref 4–11)

## 2018-10-23 PROCEDURE — 82565 ASSAY OF CREATININE: CPT | Performed by: FAMILY MEDICINE

## 2018-10-23 PROCEDURE — 36415 COLL VENOUS BLD VENIPUNCTURE: CPT | Performed by: FAMILY MEDICINE

## 2018-10-23 PROCEDURE — 84156 ASSAY OF PROTEIN URINE: CPT | Performed by: FAMILY MEDICINE

## 2018-10-23 PROCEDURE — 99207 ZZC PRENATAL VISIT: CPT | Performed by: FAMILY MEDICINE

## 2018-10-23 PROCEDURE — 84520 ASSAY OF UREA NITROGEN: CPT | Performed by: FAMILY MEDICINE

## 2018-10-23 PROCEDURE — 84450 TRANSFERASE (AST) (SGOT): CPT | Performed by: FAMILY MEDICINE

## 2018-10-23 PROCEDURE — 84550 ASSAY OF BLOOD/URIC ACID: CPT | Performed by: FAMILY MEDICINE

## 2018-10-23 PROCEDURE — 84460 ALANINE AMINO (ALT) (SGPT): CPT | Performed by: FAMILY MEDICINE

## 2018-10-23 PROCEDURE — 85025 COMPLETE CBC W/AUTO DIFF WBC: CPT | Performed by: FAMILY MEDICINE

## 2018-10-23 RX ORDER — GUAIFENESIN 600 MG/1
600 TABLET, EXTENDED RELEASE ORAL 2 TIMES DAILY PRN
Qty: 20 TABLET | Refills: 0 | Status: SHIPPED | OUTPATIENT
Start: 2018-10-23 | End: 2020-03-04

## 2018-10-23 RX ORDER — AMOXICILLIN 500 MG/1
500 CAPSULE ORAL 3 TIMES DAILY
Qty: 21 CAPSULE | Refills: 0 | Status: ON HOLD | OUTPATIENT
Start: 2018-10-23 | End: 2019-02-08

## 2018-10-23 NOTE — NURSING NOTE
"21w0d    Chief Complaint   Patient presents with     Prenatal Care     pt has cough--getting headache from coughing       Initial /60  Ht 5' 6\" (1.676 m)  Wt 176 lb 4.8 oz (80 kg)  LMP  (LMP Unknown)  BMI 28.46 kg/m2 Estimated body mass index is 28.46 kg/(m^2) as calculated from the following:    Height as of this encounter: 5' 6\" (1.676 m).    Weight as of this encounter: 176 lb 4.8 oz (80 kg).  BP completed using cuff size: regular    Questioned patient about current smoking habits.  Pt. has never smoked.          The following HM Due: NONE    "

## 2018-10-23 NOTE — PROGRESS NOTES
"CC: Here for routine prenatal visit   33 year old y/o  @ 21w0d with Estimated Date of Delivery: Mar 5, 2019   HPI: Pt is experiencing a moderate cough x 1 week. She has not noticed any improvement, would like something to help treat her symptoms.        This document serves as a record of the services and decisions personally performed and made by Meghan Quinn DO. It was created on her behalf by Genet Ramesh, a trained medical scribe. The creation of this document is based the provider's statements to the medical scribe.  Scribe Genet Ramesh 11:10 AM, 2018    /60  Ht 1.676 m (5' 6\")  Wt 80 kg (176 lb 4.8 oz)  LMP  (LMP Unknown)  BMI 28.46 kg/m2  See OB flowsheet  + fetal movement, no contractions, no bleeding, no loss of fluid   Discussed monitoring fetal movement - has begun noticing movement, will continue monitoring      1) concerns: Cough -- Antibiotic rx given, will begin taking & monitor symptoms  2) Routine care: Girl; Desires first trimester screening; GC - negative; Pap - normal  3) Type I Diabetes: Insulin - 19u at night & 1u/12 carbs at meals; Labs ordered, will be monitored by endocrinology & MFM   - Growth US every 4 weeks @ 24w & Twice Weekly BPP's @ 30 weeks    - Pre-eclamptic labs every trimester   - Induction @ 37w   - MFM consult, EKG in [place], ophthamology appointment , PIH labs  and  Urine random protein 2018.  4) Return: 4 weeks        The information in this document, created by the medical scribe for me, accurately reflects the services I personally performed and the decisions made by me. I have reviewed and approved this document for accuracy prior to leaving the patient care area.  11:10 AM, 10/23/18    Kai    "

## 2018-10-23 NOTE — MR AVS SNAPSHOT
After Visit Summary   10/23/2018    Guerline Rock    MRN: [id number]           Patient Information     Date Of Birth          1985        Visit Information        Provider Department      10/23/2018 11:00 AM Meghan Quinn, DO [facility]        Today's Diagnoses     Upper respiratory tract infection, unspecified type    -  1    Diabetes mellitus complicating pregnancy, second trimester          Care Instructions    Return in 4 weeks   MFM consult with next ultrasound   Return to clinic:  every 4 weeks till 30 weeks, then every 2 weeks till 36 weeks, then weekly till delivery      Phone numbers [place]:  Day/ night [phone] ask for ob triage  Emergency:  Call labor and delivery:  [phone]    What should I call about??    Contraction every 5 minutes for 1 hour 1 minute long (436), bleeding, loss of fluid, headache that doesn't resolve with tylenol, and decreased fetal movement     Start kick counts @ 26-28 weeks   Keep track of movement and discover your normal baby movement pattern   guideline is listed below  Please call if you do not feel the baby move!  We will have you come in for fetal heart rate monitoring:   Perception of at least 10 FMs during 12 hours of normal maternal activity   Perception of least 10 FMs over two hours when the mother is at rest and focused on [facility] Address   [address] [facility], [place] [address] [phone]    Dr. Meghan Quinn, DO    OB/GYN   [facility]                                      Follow-ups after your visit        Additional Services     MAT FETAL MED CTR REFERRAL-PREGNANCY       Body mass index is 28.46 kg/(m^2).    >> Patient may proceed with recommendations for further testing as directed by the Maternal Fetal Medicine Specialist >>    >> If requesting Fetal Echo: MFM will determine appropriate location for exam due to indication.    >> If requesting  Lung Maturity Amnio:  If results indicate fetal lung maturity, induction or C/S is recommended within 36 hours.  Please schedule accordingly.     Dear Patient:   Please be aware that coverage of these services is subject to the terms and limitations of your health insurance plan.  Call member services at your health plan with any benefit or coverage questions.      Please bring the following to your appointment:    >>  Any x-rays, CTs or MRIs which have been performed.  Contact the facility where they were done to arrange for  prior to your scheduled appointment.  Any new CT, MRI or other procedures ordered by your specialist must be performed at a [facility] or coordinated by your clinic's referral office.  >>  List of current medications   >>  This referral request   >>  Any documents/labs given to you for this referral                  Your next 10 appointments already scheduled     Nov 13, 2018  9:00 AM CST   Ech Fetal Complete* with URFETR1   [facility] Echo/EKG ([facility])    [address] [facility] [address]               Nov 13, 2018  9:30 AM CST   MFM US COMPRE SINGLE F/U with [id number]   [facility] Maternal Fetal Medicine Ultrasound - [facility])    [address] [place] [address] [phone]           Wear comfortable clothes and leave your valuables at home.            Nov 13, 2018 10:00 AM CST   Radiology MD with  MFM MD   [facility] Maternal Fetal Medicine [facility])    [address]  [facility] [address] [phone]           Please arrive at the time given for your first appointment. This visit is used internally to schedule the physician's time during your ultrasound.            Nov 19, 2018 10:30 AM CST   ESTABLISHED PRENATAL with Meghan Quinn,    [facility]    [id number] [place] [address]  "  [phone]            Dec 18, 2018  1:15 PM CST   ESTABLISHED PRENATAL with Meghan Quinn, DO   [facility]    [address] [facility] [address] [phone]              Who to contact     If you have questions or need follow up information about today's clinic visit or your schedule please contact [facility] directly at [phone].  Normal or non-critical lab and imaging results will be communicated to you by Paper[employer], letter or phone within 4 business days after the clinic has received the results. If you do not hear from us within 7 days, please contact the clinic through [employer] or phone. If you have a critical or abnormal lab result, we will notify you by phone as soon as possible.  Submit refill requests through [employer] or call your pharmacy and they will forward the refill request to us. Please allow 3 business days for your refill to be completed.          Additional Information About Your Visit        [employer] Information     [employer] gives you secure access to your electronic health record. If you see a primary care provider, you can also send messages to your care team and make appointments. If you have questions, please call your primary care clinic.  If you do not have a primary care provider, please call [phone] and they will assist you.        Care EveryWhere ID     This is your Care EveryWhere ID. This could be used by other organizations to access your [place] medical records  [id number]        Your Vitals Were     Height Last Period BMI (Body Mass Index)             5' 6\" (1.676 m) (LMP Unknown) 28.46 kg/m2          Blood Pressure from Last 3 Encounters:   10/23/18 110/60   10/16/18 108/64   09/27/18 106/58    Weight from Last 3 Encounters:   10/23/18 176 lb 4.8 oz (80 kg)   10/16/18 174 lb 8 oz (79.2 kg)   09/27/18 169 lb 12.8 oz (77 kg)              We Performed the Following     ALT     AST     CBC with " platelets differential     Creatinine     MAT FETAL MED CTR REFERRAL-PREGNANCY     Protein  random urine with Creat Ratio     Urea nitrogen     Uric acid          Today's Medication Changes          These changes are accurate as of 10/23/18 11:25 AM.  If you have any questions, ask your nurse or doctor.               Start taking these medicines.        Dose/Directions    amoxicillin 500 MG capsule   Commonly known as:  AMOXIL   Used for:  Upper respiratory tract infection, unspecified type   Started by:  Meghan Quinn DO        Dose:  500 mg   Take 1 capsule (500 mg) by mouth 3 times daily for 7 days   Quantity:  21 capsule   Refills:  0       guaiFENesin 600 MG 12 hr tablet   Commonly known as:  MUCINEX   Used for:  Upper respiratory tract infection, unspecified type   Started by:  Meghan Quinn DO        Dose:  600 mg   Take 1 tablet (600 mg) by mouth 2 times daily as needed for congestion   Quantity:  20 tablet   Refills:  0            Where to get your medicines      These medications were sent to [place] [address] [place] [id number]    Hours:  Tech issues with their phone system Phone:  [phone]     amoxicillin 500 MG capsule    guaiFENesin 600 MG 12 hr tablet                Primary Care Provider Fax #    Physician No Ref-Primary [phone]       No address on file        Equal Access to Services     STEFANIE ELIAS : Eyad Perry, wagabbida jaskaran, qaybta kaalmada ademariama, george lam. So [facility] [phone].    ATENCIÓN: Si habla español, tiene a baker disposición servicios gratuitos de asistencia lingüística. Octavia al [phone].    We comply with applicable federal civil rights laws and [place] laws. We do not discriminate on the basis of race, color, national origin, age, disability, sex, sexual orientation, or gender identity.            Thank you!     Thank you for choosing  [facility]  for your care. Our goal is always to provide you with excellent care. Hearing back from our patients is one way we can continue to improve our services. Please take a few minutes to complete the written survey that you may receive in the mail after your visit with us. Thank you!             Your Updated Medication List - Protect others around you: Learn how to safely use, store and throw away your medicines at www.disposemymeds.org.          This list is accurate as of 10/23/18 11:25 AM.  Always use your most recent med list.                   Brand Name Dispense Instructions for use Diagnosis    amoxicillin 500 MG capsule    AMOXIL    21 capsule    Take 1 capsule (500 mg) by mouth 3 times daily for 7 days    Upper respiratory tract infection, unspecified type       blood glucose monitoring test strip    no brand specified    100 strip    Use to test blood sugars 6 times daily or as directed    Type 1 diabetes mellitus without complication (H)       guaiFENesin 600 MG 12 hr tablet    MUCINEX    20 tablet    Take 1 tablet (600 mg) by mouth 2 times daily as needed for congestion    Upper respiratory tract infection, unspecified type       humaLOG 100 UNIT/ML injection   Generic drug:  insulin lispro     3 Month    units before breakfast,  units before lunch,  units before dinner one unit per 15 carbs        ofloxacin 0.3 % ophthalmic solution    OCUFLOX    1 Bottle    Apply 1 drop to eye every 4 hours    Pink eye disease, unspecified laterality       prenatal multivitamin plus iron 27-0.8 MG Tabs per tablet      Take 1 tablet by mouth daily

## 2018-10-23 NOTE — PATIENT INSTRUCTIONS
Return in 4 weeks   MFM consult with next ultrasound   Return to clinic:  every 4 weeks till 30 weeks, then every 2 weeks till 36 weeks, then weekly till delivery      Phone numbers [place]:  Day/ night [phone] ask for ob triage  Emergency:  Call labor and delivery:  [phone]    What should I call about??    Contraction every 5 minutes for 1 hour 1 minute long (511), bleeding, loss of fluid, headache that doesn't resolve with tylenol, and decreased fetal movement     Start kick counts @ 26-28 weeks   Keep track of movement and discover your normal baby movement pattern   guideline is listed below  Please call if you do not feel the baby move!  We will have you come in for fetal heart rate monitoring:   Perception of at least 10 FMs during 12 hours of normal maternal activity   Perception of least 10 FMs over two hours when the mother is at rest and focused on [facility] Address   [address], [place] [address]  [phone]    Dr. Meghan Quinn, DO    OB/GYN   [facility] [place] [facility]

## 2018-10-24 LAB
ALT SERPL W P-5'-P-CCNC: 25 U/L (ref 0–50)
AST SERPL W P-5'-P-CCNC: 20 U/L (ref 0–45)
BUN SERPL-MCNC: 12 MG/DL (ref 7–30)
CREAT SERPL-MCNC: 0.57 MG/DL (ref 0.52–1.04)
GFR SERPL CREATININE-BSD FRML MDRD: >90 ML/MIN/1.7M2
URATE SERPL-MCNC: 3.1 MG/DL (ref 2.6–6)

## 2018-11-08 ENCOUNTER — TRANSFERRED RECORDS (OUTPATIENT)
Dept: HEALTH INFORMATION MANAGEMENT | Facility: CLINIC | Age: 33
End: 2018-11-08

## 2018-11-13 ENCOUNTER — TRANSFERRED RECORDS (OUTPATIENT)
Dept: HEALTH INFORMATION MANAGEMENT | Facility: CLINIC | Age: 33
End: 2018-11-13

## 2018-11-13 ENCOUNTER — OFFICE VISIT (OUTPATIENT)
Dept: MATERNAL FETAL MEDICINE | Facility: CLINIC | Age: 33
End: 2018-11-13
Attending: FAMILY MEDICINE
Payer: COMMERCIAL

## 2018-11-13 ENCOUNTER — OFFICE VISIT (OUTPATIENT)
Dept: PEDIATRIC CARDIOLOGY | Facility: CLINIC | Age: 33
End: 2018-11-13

## 2018-11-13 ENCOUNTER — HOSPITAL ENCOUNTER (OUTPATIENT)
Dept: ULTRASOUND IMAGING | Facility: CLINIC | Age: 33
Discharge: HOME | End: 2018-11-13
Attending: OBSTETRICS & GYNECOLOGY | Admitting: OBSTETRICS & GYNECOLOGY
Payer: COMMERCIAL

## 2018-11-13 ENCOUNTER — HOSPITAL ENCOUNTER (OUTPATIENT)
Dept: CARDIOLOGY | Facility: CLINIC | Age: 33
Discharge: HOME | End: 2018-11-13
Attending: OBSTETRICS & GYNECOLOGY | Admitting: OBSTETRICS & GYNECOLOGY
Payer: COMMERCIAL

## 2018-11-13 DIAGNOSIS — O24.912 DIABETES MELLITUS COMPLICATING PREGNANCY, ANTEPARTUM, SECOND TRIMESTER: Primary | ICD-10-CM

## 2018-11-13 DIAGNOSIS — O24.019 TYPE 1 DIABETES MELLITUS AFFECTING PREGNANCY, ANTEPARTUM: ICD-10-CM

## 2018-11-13 DIAGNOSIS — Z36.83 ENCOUNTER FOR FETAL SCREENING FOR CONGENITAL CARDIAC ABNORMALITIES: Primary | ICD-10-CM

## 2018-11-13 DIAGNOSIS — O26.90 PREGNANCY RELATED CONDITION, ANTEPARTUM: ICD-10-CM

## 2018-11-13 PROCEDURE — 76816 OB US FOLLOW-UP PER FETUS: CPT

## 2018-11-13 PROCEDURE — 93325 DOPPLER ECHO COLOR FLOW MAPG: CPT

## 2018-11-13 NOTE — LETTER
2018      RE: Guerline Rock  [address] [place] [address]       Fetal Cardiology Consult    Date of Visit:   2018  Gestational Age: 24 weeks  Due Date:  2019  Delivery:  [facility]      Dear Dr. langley    I had the opportunity to meet with Guerline and her partner today for a Fetal Cardiology Consult and Fetal Echocardiography.    Fetal Echo demonstrated Likely normal fetal echocardiogram. Normal right and left ventricular size and function. Trivial tricuspid valve insufficiency. Trace of pericardial effusion.No hydrops. Fetal heart rate is regular at 146 bpm.    I have reviewed the Fetal Echo findings.     The parents had appropriate questions. I did my best to answer their questions.    Plan:    No follow-up is needed    Obtain a  Echo in 24-48 hours after birth    Thank you for allowing me to participate in Guerline's care.  Feel free to contact me with questions.    I spend 10 minutes counseling the patient about her fetal echocardiogram findings. All of this time was face to face.    Dr Cayla Jason  Pediatric Cardiologist  Director, Fetal Cardiology  [facility]  Phone [phone]

## 2018-11-13 NOTE — PROGRESS NOTES
Fetal Cardiology Consult    Date of Visit:   2018  Gestational Age: 24 weeks  Due Date:  2019  Delivery:  [facility]      Dear Dr. langley    I had the opportunity to meet with Guerline and her partner today for a Fetal Cardiology Consult and Fetal Echocardiography.    Fetal Echo demonstrated Likely normal fetal echocardiogram. Normal right and left ventricular size and function. Trivial tricuspid valve insufficiency. Trace of pericardial effusion.No hydrops. Fetal heart rate is regular at 146 bpm.    I have reviewed the Fetal Echo findings.     The parents had appropriate questions. I did my best to answer their questions.    Plan:    No follow-up is needed    Obtain a  Echo in 24-48 hours after birth    Thank you for allowing me to participate in Guerline's care.  Feel free to contact me with questions.    I spend 10 minutes counseling the patient about her fetal echocardiogram findings. All of this time was face to face.    Dr Cayla Jason  Pediatric Cardiologist  Director, Fetal Cardiology  [facility]  Phone [phone]

## 2018-11-13 NOTE — MR AVS SNAPSHOT
After Visit Summary   11/13/2018    Guerline Rock    MRN: [id number]           Patient Information     Date Of Birth          1985        Visit Information        Provider Department      11/13/2018 9:54 AM Cayla Jason MD Peds Cardiology        Today's Diagnoses     Encounter for fetal screening for congenital cardiac abnormalities    -  1       Follow-ups after your visit        Your next 10 appointments already scheduled     Nov 13, 2018  1:30 PM CST   MFM US COMPRE SINGLE F/U with [id number]   [facility] Maternal Fetal Medicine Ultrasound - [facility])    [address]  [facility] [address]   [phone]           Wear comfortable clothes and leave your valuables at home.            Nov 13, 2018  2:15 PM CST   Consult MFM with RH EXAM RM 1/NST   [facility] Maternal Fetal Medicine - [facility])    [address]  [facility] [id number] [phone]            Nov 19, 2018 10:30 AM CST   ESTABLISHED PRENATAL with Meghan Quinn DO   [facility]    [address] [address] [id number] [phone]            Dec 18, 2018  1:15 PM CST   ESTABLISHED PRENATAL with Meghan Quinn DO   [facility] ([facility]    [address] [address] [id number] [phone]              Who to contact     Please call your clinic at [phone] to:    Ask questions about your health    Make or cancel appointments    Discuss your medicines    Learn about your test results    Speak to your doctor            Additional Information About Your Visit        MyChart Information     [employer] gives you secure access to your electronic health record. If you see a primary care provider, you can also send messages to your care team and make appointments. If you have questions, please call your primary care clinic.  If you do not have a  primary care provider, please call [phone] and they will assist you.      [employer] is an electronic gateway that provides easy, online access to your medical records. With [employer], you can request a clinic appointment, read your test results, renew a prescription or communicate with your care team.     To access your existing account, please contact your [facility] or call [phone] for assistance.        Care EveryWhere ID     This is your Care EveryWhere ID. This could be used by other organizations to access your [facility] medical records  [id number]        Your Vitals Were     Last Period                   (LMP Unknown)            Blood Pressure from Last 3 Encounters:   10/23/18 110/60   10/16/18 108/64   09/27/18 106/58    Weight from Last 3 Encounters:   10/23/18 176 lb 4.8 oz (80 kg)   10/16/18 174 lb 8 oz (79.2 kg)   09/27/18 169 lb 12.8 oz (77 kg)              Today, you had the following     No orders found for display       Primary Care Provider Fax #    Physician No Ref-Primary [phone]       No address on file        Equal Access to Services     CATRACH[employer] ELIAS : Hadii gianni callahan Socarmen, waaxda luqadaha, qaybta kaalmakevin coronado, george napier . So [facility] [phone].    ATENCIÓN: Si habla español, tiene a baker disposición servicios gratuitos de asistencia lingüística. Octavia al [phone].    We comply with applicable federal civil rights laws and [place] laws. We do not discriminate on the basis of race, color, national origin, age, disability, sex, sexual orientation, or gender identity.            Thank you!     Thank you for choosing PEDS CARDIOLOGY  for your care. Our goal is always to provide you with excellent care. Hearing back from our patients is one way we can continue to improve our services. Please take a few minutes to complete the written survey that you may receive in the mail after your visit with us. Thank  you!             Your Updated Medication List - Protect others around you: Learn how to safely use, store and throw away your medicines at www.disposemymeds.org.          This list is accurate as of 11/13/18  9:58 AM.  Always use your most recent med list.                   Brand Name Dispense Instructions for use Diagnosis    blood glucose monitoring test strip    no brand specified    100 strip    Use to test blood sugars 6 times daily or as directed    Type 1 diabetes mellitus without complication (H)       guaiFENesin 600 MG 12 hr tablet    MUCINEX    20 tablet    Take 1 tablet (600 mg) by mouth 2 times daily as needed for congestion    Upper respiratory tract infection, unspecified type       humaLOG 100 UNIT/ML injection   Generic drug:  insulin lispro     3 Month    units before breakfast,  units before lunch,  units before dinner one unit per 15 carbs        ofloxacin 0.3 % ophthalmic solution    OCUFLOX    1 Bottle    Apply 1 drop to eye every 4 hours    Pink eye disease, unspecified laterality       prenatal multivitamin plus iron 27-0.8 MG Tabs per tablet      Take 1 tablet by mouth daily

## 2018-11-13 NOTE — MR AVS SNAPSHOT
After Visit Summary   11/13/2018    Guerline Rock    MRN: [id number]           Patient Information     Date Of Birth          1985        Visit Information        Provider Department      11/13/2018 2:15 PM Suzie Isaac DO [facility] Maternal Fetal Medicine [facility]        Today's Diagnoses     Diabetes mellitus complicating pregnancy, antepartum, second trimester    -  1    Pregnancy related condition, antepartum           Follow-ups after your visit        Your next 10 appointments already scheduled     Nov 19, 2018 10:30 AM CST   ESTABLISHED PRENATAL with Meghan Quinn,    [facility] ([facility])    [address] [address] [address] [phone]            Dec 18, 2018  1:15 PM CST   ESTABLISHED PRENATAL with Meghan Quinn DO   [facility] ([facility])    [address] [id number] [phone]              Future tests that were ordered for you today     Open Future Orders        Priority Expected Expires Ordered    MFM US Comprehensive Single F/U Routine 12/11/2018 11/13/2019 11/13/2018            Who to contact     If you have questions or need follow up information about today's clinic visit or your schedule please contact [facility] MATERNAL FETAL MEDICINE [facility] directly at [phone].  Normal or non-critical lab and imaging results will be communicated to you by MyChart, letter or phone within 4 business days after the clinic has received the results. If you do not hear from us within 7 days, please contact the clinic through [employer] or phone. If you have a critical or abnormal lab result, we will notify you by phone as soon as possible.  Submit refill requests through [employer] or call your pharmacy and they will forward the refill request to us. Please allow 3 business days for your refill to be completed.          Additional Information About Your Visit        [employer]  Information     [employer] gives you secure access to your electronic health record. If you see a primary care provider, you can also send messages to your care team and make appointments. If you have questions, please call your primary care clinic.  If you do not have a primary care provider, please call [phone] and they will assist you.        Care EveryWhere ID     This is your Care EveryWhere ID. This could be used by other organizations to access your [facility] medical records  [id number]        Your Vitals Were     Last Period                   (LMP Unknown)            Blood Pressure from Last 3 Encounters:   10/23/18 110/60   10/16/18 108/64   09/27/18 106/58    Weight from Last 3 Encounters:   10/23/18 80 kg (176 lb 4.8 oz)   10/16/18 79.2 kg (174 lb 8 oz)   09/27/18 77 kg (169 lb 12.8 oz)              We Performed the Following     MFM Office Visit        Primary Care Provider Office Phone # Fax #    Meghan Kevinhoa Quinn,  [phone]       [address] [facility] [address]        Equal Access to Services     [facility]: Hadii aad ku hadasho Soomaali, waaxda luqadaha, qaybta kaalmada adeegyakevin, george napier . So [facility] [phone].    ATENCIÓN: Si habla español, tiene a baker disposición servicios gratCHRISTUS St. Vincent Physicians Medical Centeros de asistencia lingüística. Octavia al [phone].    We comply with applicable federal civil rights laws and [place] laws. We do not discriminate on the basis of race, color, national origin, age, disability, sex, sexual orientation, or gender identity.            Thank you!     Thank you for choosing [facility]  for your care. Our goal is always to provide you with excellent care. Hearing back from our patients is one way we can continue to improve our services. Please take a few minutes to complete the written survey that you may receive in the mail after your visit with us. Thank you!             Your Updated  Medication List - Protect others around you: Learn how to safely use, store and throw away your medicines at www.disposemymeds.org.          This list is accurate as of 11/13/18  4:09 PM.  Always use your most recent med list.                   Brand Name Dispense Instructions for use Diagnosis    blood glucose monitoring test strip    no brand specified    100 strip    Use to test blood sugars 6 times daily or as directed    Type 1 diabetes mellitus without complication (H)       guaiFENesin 600 MG 12 hr tablet    MUCINEX    20 tablet    Take 1 tablet (600 mg) by mouth 2 times daily as needed for congestion    Upper respiratory tract infection, unspecified type       humaLOG 100 UNIT/ML injection   Generic drug:  insulin lispro     3 Month    units before breakfast,  units before lunch,  units before dinner one unit per 15 carbs        ofloxacin 0.3 % ophthalmic solution    OCUFLOX    1 Bottle    Apply 1 drop to eye every 4 hours    Pink eye disease, unspecified laterality       prenatal multivitamin plus iron 27-0.8 MG Tabs per tablet      Take 1 tablet by mouth daily

## 2018-11-13 NOTE — PROGRESS NOTES
"  Dear Dr. Quinn,     Thank you for your request for maternal fetal medicine consultation on your patient Guerline Rock, for her history of  uncontrolled diabetes mellitus type I in pregnancy.      HPI: Guerline Escoto is a 32 y/o  at 24+0 weeks with uncontrolled type I diabetes mellitus.  She was recently seen by endocrinology Dr. Rojas ([facility] [place]) on  and switched from MDI insulin to an insulin pump.  Since that time her glucose levels have been fasting 's and post-prandial 120-108's, denies any hypoglycemic episodes.  Prior to this she was on MDI with lantus 19 units and novolog 1:10 in am and 1:15 with lunch and dinner.  At that time she was not counting carbs and had been guessing at doses per the endocrinology note, but Hgb A1c was 6.2%, improved from 7.0% on . Thyroid testing was also normal.  State that she was also seen by ophthalmology today,and was told her eyes \"were fine\" but we do not have records of this.        Baseline preeclampsia labs were normal, with a urine protein to creatinine ratio of 0.15, she has not been taking a daily ASA.  Has previously had a normal comprehensive ultrasound and screening fetal ECHO.  Of note, her last pregnancies were complicated by uncontrolled DM requiring delivery at 37 + weeks.    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  Risks include, but are not limited to: risks of fetal demise, preeclampsia, fetal macrosomia,  hypoglycemia,  birth, need for an operative or surgical delivery, maternal ketoacidosis.  These risks can be reduced with tight glycemic control and reduction of hemoglobin A1C to < 6%.  We also discussed starting a daily 81 mg ASA to reduce the risk of preeclampsia.    Recommendations:    1) Continue insulin therapy with the goal of maintaining euglycemia.  Goals for glucose monitoring are fasting 70-95 mg/dL, 2 hour postprandial < 140 mg/dL.  Recommend capillary blood glucose monitoring at least 4 times a day (fasting, 2 hours after each meal).  Continue follow-up and insulin management with endocrinology.     2) Recommend starting daily ASA to reduce the risk of preeclampsia, with close BP monitoring in the third trimester.  Baseline labs and urine protein to creatinine ratio were within normal limits.    3) Serial growth ultrasounds every 4 weeks.     4)  surveillance to begin with twice weekly BPPs at 32 weeks or sooner if continues with poor control (28 weeks with weekly BPPs)    5) Mode of delivery may be altered based on estimated fetal weight near EDC, with  recommended for fetal weight > 4500g.    6) Recommend avoiding prolongation of pregnancy beyond 39 weeks, with earlier delivery reserved for poor control (has been required in the past at ~ 37 weeks).    A copy of this consultation will be sent to your office.    Thank you for the opportunity to participate in the care of this patient.  If you have questions regarding today s evaluation or if we can be of further service, please contact the [facility]etal Medicine Center.    The patient was seen for an outpatient consultation beyond the performance and interpretation of the ultrasound.  The majority of time (>50%) was spent on counseling and coordination of care of  this patient and/or family members.  Total face-to-face time was 15 minutes.    Suzie Isaac DO  Maternal Fetal Medicine

## 2018-11-19 ENCOUNTER — PRENATAL OFFICE VISIT (OUTPATIENT)
Dept: OBGYN | Facility: CLINIC | Age: 33
End: 2018-11-19
Payer: COMMERCIAL

## 2018-11-19 VITALS
HEIGHT: 66 IN | DIASTOLIC BLOOD PRESSURE: 58 MMHG | SYSTOLIC BLOOD PRESSURE: 112 MMHG | WEIGHT: 178.6 LBS | BODY MASS INDEX: 28.7 KG/M2

## 2018-11-19 DIAGNOSIS — Z34.92 PRENATAL CARE IN SECOND TRIMESTER: Primary | ICD-10-CM

## 2018-11-19 DIAGNOSIS — E10.9 TYPE 1 DIABETES MELLITUS WITHOUT COMPLICATION (H): ICD-10-CM

## 2018-11-19 PROCEDURE — 93000 ELECTROCARDIOGRAM COMPLETE: CPT | Performed by: FAMILY MEDICINE

## 2018-11-19 PROCEDURE — 99207 ZZC PRENATAL VISIT: CPT | Performed by: FAMILY MEDICINE

## 2018-11-19 NOTE — NURSING NOTE
"24w6d    Chief Complaint   Patient presents with     Prenatal Care     discuss ekg       Initial /58  Ht 5' 6\" (1.676 m)  Wt 178 lb 9.6 oz (81 kg)  LMP  (LMP Unknown)  BMI 28.83 kg/m2 Estimated body mass index is 28.83 kg/(m^2) as calculated from the following:    Height as of this encounter: 5' 6\" (1.676 m).    Weight as of this encounter: 178 lb 9.6 oz (81 kg).  BP completed using cuff size: regular    Questioned patient about current smoking habits.  Pt. has never smoked.          The following HM Due: NONE         "

## 2018-11-19 NOTE — PROGRESS NOTES
"CC: Here for routine prenatal visit   33 year old y/o  @ 24w6d with Estimated Date of Delivery: Mar 5, 2019   HPI: Pt is doing well, has finally \"gotten over\" her URI symptoms.        This document serves as a record of the services and decisions personally performed and made by Meghan Quinn DO. It was created on her behalf by Genet Ramesh, a trained medical scribe. The creation of this document is based the provider's statements to the medical scribe.  Scribe Genet Ramesh 11:14 AM, 2018    /58  Ht 1.676 m (5' 6\")  Wt 81 kg (178 lb 9.6 oz)  LMP  (LMP Unknown)  BMI 28.83 kg/m2  See OB flowsheet  + fetal movement, no contractions, no bleeding, no loss of fluid   Discussed monitoring fetal movement - reports good movement today      1) concerns: No concerns today  2) Routine care: Girl; Desires first trimester screening; GC - negative; Pap - normal  3) Type I Diabetes: Insulin - 19u at night & 1u/8 carbs in the morning & 1u/12 carbs after noon; Labs ordered, will be monitored by endocrinology & MFM   - Growth US every 4 weeks @ 24w & Twice Weekly BPP's @ 30 weeks    - Pre-eclamptic labs every trimester   - Induction @ 37w- 39w depending on glucose control   - MFM consult, EKG in [place] on 2018, ophthamology appointment  , PIH labs and urine random protein 2018.    > MFM advised daily aspirin  3) Return: 4 weeks        The information in this document, created by the medical scribe for me, accurately reflects the services I personally performed and the decisions made by me. I have reviewed and approved this document for accuracy prior to leaving the patient care area.  11:14 AM, 18    Kai    "

## 2018-11-19 NOTE — PATIENT INSTRUCTIONS
Return in 4 weeks  Return to clinic:  every 4 weeks till 30 weeks, then every 2 weeks till 36 weeks, then weekly till delivery      Phone numbers [place]:  Day/ night [phone] ask for ob triage  Emergency:  Call labor and delivery:  [phone]    What should I call about??    Contraction every 5 minutes for 1 hour 1 minute long (511), bleeding, loss of fluid, headache that doesn't resolve with tylenol, and decreased fetal movement     Start kick counts @ 26-28 weeks   Keep track of movement and discover your normal baby movement pattern   guideline is listed below  Please call if you do not feel the baby move!  We will have you come in for fetal heart rate monitoring:   Perception of at least 10 FMs during 12 hours of normal maternal activity   Perception of least 10 FMs over two hours when the mother is at rest and focused on [facility] Address   [address], [place], [place] [address] [phone]    Dr. Meghan Quinn, DO    OB/GYN   [facility]

## 2018-11-19 NOTE — MR AVS SNAPSHOT
After Visit Summary   11/19/2018    Guerline Rock    MRN: [id number]           Patient Information     Date Of Birth          1985        Visit Information        Provider Department      11/19/2018 10:30 AM Meghan Quinn, DO [facility]        Today's Diagnoses     Prenatal care in second trimester    -  1      Care Instructions    Return in 4 weeks  Return to clinic:  every 4 weeks till 30 weeks, then every 2 weeks till 36 weeks, then weekly till delivery      Phone numbers [place]:  Day/ night [phone] ask for ob triage  Emergency:  Call labor and delivery:  [phone]    What should I call about??    Contraction every 5 minutes for 1 hour 1 minute long (511), bleeding, loss of fluid, headache that doesn't resolve with tylenol, and decreased fetal movement     Start kick counts @ 26-28 weeks   Keep track of movement and discover your normal baby movement pattern   guideline is listed below  Please call if you do not feel the baby move!  We will have you come in for fetal heart rate monitoring:   Perception of at least 10 FMs during 12 hours of normal maternal activity   Perception of least 10 FMs over two hours when the mother is at rest and focused on [facility] Address   [address], [place] [address] [phone]    Dr. Meghan Quinn, DO    OB/GYN   [facility]                                      Follow-ups after your visit        Your next 10 appointments already scheduled     Dec 11, 2018  9:30 AM DARRYL SHETH US COMPRE SINGLE F/U with [id number]   [facility])    [address] [facility] [address]  [facility] [address] [phone]           Wear comfortable clothes and leave your valuables at home.            Dec 11, 2018 10:00 AM DARRYL   Radiology MD with RH MERYL KNOTT   [facility] ([facility]  "[facility])    [address] [facility] [address]  [facility] [id number] [phone]           Please arrive at the time given for your first appointment. This visit is used internally to schedule the physician's time during your ultrasound.            Dec 18, 2018  1:15 PM CST   ESTABLISHED PRENATAL with [facility]han Quinn, DO   [facility]    [address] [facility] [address] [phone]              Who to contact     If you have questions or need follow up information about today's clinic visit or your schedule please contact [facility] directly at [phone].  Normal or non-critical lab and imaging results will be communicated to you by GeoMehart, letter or phone within 4 business days after the clinic has received the results. If you do not hear from us within 7 days, please contact the clinic through Realeyes 3Dt or phone. If you have a critical or abnormal lab result, we will notify you by phone as soon as possible.  Submit refill requests through Tappit or call your pharmacy and they will forward the refill request to us. Please allow 3 business days for your refill to be completed.          Additional Information About Your Visit        GeoMeharPAAY Information     Tappit gives you secure access to your electronic health record. If you see a primary care provider, you can also send messages to your care team and make appointments. If you have questions, please call your primary care clinic.  If you do not have a primary care provider, please call [phone] and they will assist you.        Care EveryWhere ID     This is your Care EveryWhere ID. This could be used by other organizations to access your [facility] medical records  [id number]        Your Vitals Were     Height Last Period BMI (Body Mass Index)             5' 6\" (1.676 m) (LMP Unknown) 28.83 kg/m2          Blood Pressure from Last 3 Encounters:   11/19/18 112/58   10/23/18 " 110/60   10/16/18 108/64    Weight from Last 3 Encounters:   11/19/18 178 lb 9.6 oz (81 kg)   10/23/18 176 lb 4.8 oz (80 kg)   10/16/18 174 lb 8 oz (79.2 kg)              Today, you had the following     No orders found for display       Primary Care Provider Office Phone # Fax #    Meghan Quinn, [phone]       [address]  [facility] [address]        Equal Access to Services     STEFANIE ELIAS : Hadii aad ku hadasho Soomaali, waaxda luqadaha, qaybta kaalmada adeegyada, waxay idiin hayaan adeeg khvita napier . So [facility] [phone].    ATENCIÓN: Si habla español, tiene a baker disposición servicios gratuitos de asistencia lingüística. Llame al [phone].    We comply with applicable federal civil rights laws and [place] laws. We do not discriminate on the basis of race, color, national origin, age, disability, sex, sexual orientation, or gender identity.            Thank you!     Thank you for choosing [facility]  for your care. Our goal is always to provide you with excellent care. Hearing back from our patients is one way we can continue to improve our services. Please take a few minutes to complete the written survey that you may receive in the mail after your visit with us. Thank you!             Your Updated Medication List - Protect others around you: Learn how to safely use, store and throw away your medicines at www.disposemymeds.org.          This list is accurate as of 11/19/18 11:22 AM.  Always use your most recent med list.                   Brand Name Dispense Instructions for use Diagnosis    blood glucose monitoring test strip    no brand specified    100 strip    Use to test blood sugars 6 times daily or as directed    Type 1 diabetes mellitus without complication (H)       guaiFENesin 600 MG 12 hr tablet    MUCINEX    20 tablet    Take 1 tablet (600 mg) by mouth 2 times daily as needed for congestion    Upper respiratory tract infection, unspecified  type       humaLOG 100 UNIT/ML injection   Generic drug:  insulin lispro     3 Month    units before breakfast,  units before lunch,  units before dinner one unit per 15 carbs        ofloxacin 0.3 % ophthalmic solution    OCUFLOX    1 Bottle    Apply 1 drop to eye every 4 hours    Pink eye disease, unspecified laterality       prenatal multivitamin plus iron 27-0.8 MG Tabs per tablet      Take 1 tablet by mouth daily

## 2018-12-11 ENCOUNTER — HOSPITAL ENCOUNTER (OUTPATIENT)
Dept: ULTRASOUND IMAGING | Facility: CLINIC | Age: 33
Discharge: HOME | End: 2018-12-11
Attending: OBSTETRICS & GYNECOLOGY | Admitting: OBSTETRICS & GYNECOLOGY
Payer: COMMERCIAL

## 2018-12-11 ENCOUNTER — OFFICE VISIT (OUTPATIENT)
Dept: MATERNAL FETAL MEDICINE | Facility: CLINIC | Age: 33
End: 2018-12-11
Attending: OBSTETRICS & GYNECOLOGY
Payer: COMMERCIAL

## 2018-12-11 DIAGNOSIS — O26.90 PREGNANCY RELATED CONDITION, ANTEPARTUM: ICD-10-CM

## 2018-12-11 DIAGNOSIS — O24.013 TYPE 1 DIABETES MELLITUS COMPLICATING PREGNANCY, ANTEPARTUM, THIRD TRIMESTER: Primary | ICD-10-CM

## 2018-12-11 DIAGNOSIS — O24.912 DIABETES MELLITUS COMPLICATING PREGNANCY, ANTEPARTUM, SECOND TRIMESTER: ICD-10-CM

## 2018-12-11 PROCEDURE — 76816 OB US FOLLOW-UP PER FETUS: CPT

## 2018-12-11 NOTE — PROGRESS NOTES
"Please see \"Imaging\" tab under \"Chart Review\" for details of today's US at the [facility].    Alex Gomez MD  Maternal-Fetal Medicine    "

## 2018-12-18 ENCOUNTER — PRENATAL OFFICE VISIT (OUTPATIENT)
Dept: OBGYN | Facility: CLINIC | Age: 33
End: 2018-12-18
Payer: COMMERCIAL

## 2018-12-18 VITALS
WEIGHT: 180.6 LBS | BODY MASS INDEX: 29.02 KG/M2 | DIASTOLIC BLOOD PRESSURE: 58 MMHG | SYSTOLIC BLOOD PRESSURE: 100 MMHG | HEIGHT: 66 IN

## 2018-12-18 DIAGNOSIS — Z34.93 PRENATAL CARE IN THIRD TRIMESTER: Primary | ICD-10-CM

## 2018-12-18 PROCEDURE — 99207 ZZC PRENATAL VISIT: CPT | Performed by: FAMILY MEDICINE

## 2018-12-18 PROCEDURE — 90471 IMMUNIZATION ADMIN: CPT | Performed by: FAMILY MEDICINE

## 2018-12-18 PROCEDURE — 90715 TDAP VACCINE 7 YRS/> IM: CPT | Performed by: FAMILY MEDICINE

## 2018-12-18 ASSESSMENT — MIFFLIN-ST. JEOR: SCORE: 1550.95

## 2018-12-18 NOTE — PATIENT INSTRUCTIONS
Return in 2 weeks   Return to clinic:  every 4 weeks till 30 weeks, then every 2 weeks till 36 weeks, then weekly till delivery      Phone numbers [place]:  Day/ night [phone] ask for ob triage  Emergency:  Call labor and delivery:  [phone]    What should I call about??    Contraction every 5 minutes for 1 hour 1 minute long (511), bleeding, loss of fluid, headache that doesn't resolve with tylenol, and decreased fetal movement     Start kick counts @ 26-28 weeks   Keep track of movement and discover your normal baby movement pattern   guideline is listed below  Please call if you do not feel the baby move!  We will have you come in for fetal heart rate monitoring:   Perception of at least 10 FMs during 12 hours of normal maternal activity   Perception of least 10 FMs over two hours when the mother is at rest and focused on [facility] Address   [address], [place], [place] [address] [phone]    Dr. Meghan Quinn, DO    OB/GYN   [facility] and [facility]

## 2018-12-18 NOTE — NURSING NOTE
"29w0d    Chief Complaint   Patient presents with     Prenatal Care     Imm/Inj     TDAP       Initial /58 (BP Location: Right arm, Patient Position: Sitting, Cuff Size: Adult Regular)   Ht 1.676 m (5' 6\")   Wt 81.9 kg (180 lb 9.6 oz)   LMP  (LMP Unknown)   BMI 29.15 kg/m   Estimated body mass index is 29.15 kg/m  as calculated from the following:    Height as of this encounter: 1.676 m (5' 6\").    Weight as of this encounter: 81.9 kg (180 lb 9.6 oz).  BP completed using cuff size: regular    Questioned patient about current smoking habits.  Pt. has never smoked.          The following HM Due: NONE           "

## 2018-12-18 NOTE — PROGRESS NOTES
"CC: Here for routine prenatal visit   33 year old y/o  @ 29w0d with Estimated Date of Delivery: Mar 5, 2019   HPI: Pt states her diabetes has been \"really bad\" recently, has an appointment with her endocrinologist tomorrow to determine what needs to be done.         This document serves as a record of the services and decisions personally performed and made by Meghan Quinn DO. It was created on her behalf by Genet Ramesh, a trained medical scribe. The creation of this document is based the provider's statements to the medical scribe.  Scribe Genet Ramesh 1:46 PM, 2018    /58 (BP Location: Right arm, Patient Position: Sitting, Cuff Size: Adult Regular)   Ht 1.676 m (5' 6\")   Wt 81.9 kg (180 lb 9.6 oz)   LMP  (LMP Unknown)   BMI 29.15 kg/m    See OB flowsheet  + fetal movement, no contractions, no bleeding, no loss of fluid   Discussed monitoring fetal movement - kick counts explained, will begin monitoring      1) concerns: DM -- advised to continue meeting with endocrinologist & report any changes in insulin administration  2) Routine care: Girl; Desires first trimester screening; GC - negative; Pap - normal  3) Type I Diabetes: Insulin - 20u at night & 1u/7 carbs in the morning & 1u/9 carbs after noon; Labs ordered, will be monitored by endocrinology & MFM               - Growth US every 4 weeks @ 24w & Twice Weekly BPP's @ 30 weeks                - Pre-eclamptic labs every trimester               - Induction @ 37w- 39w depending on glucose control               - MFM consult, EKG in [place] on 2018, ophthamology appointment         , PIH labs and urine random protein 2018.                            > MFM advised daily aspirin  4) Return: 2 weeks        The information in this document, created by the medical scribe for me, accurately reflects the services I personally performed and the decisions made by me. I have reviewed and approved this document for accuracy " prior to leaving the patient care area.  1:46 PM, 12/18/18    Kai

## 2018-12-27 NOTE — PROGRESS NOTES
"CC: Here for routine prenatal visit   33 year old y/o  @ 30w2d with Estimated Date of Delivery: Mar 5, 2019   HPI: doing well       /62 (BP Location: Right arm, Patient Position: Sitting, Cuff Size: Adult Regular)   Ht 1.676 m (5' 6\")   Wt 82.8 kg (182 lb 8 oz)   LMP  (LMP Unknown)   BMI 29.46 kg/m     LMP  (LMP Unknown)   See OB flowsheet  + fetal movement, no contractions, no bleeding, no loss of fluid   Discussed monitoring fetal movement       1) concerns: letter written to travel   2) Routine care: Girl; Desires first trimester screening; GC - negative; Pap - normal  3) Type I Diabetes: Insulin - 20u at night & 1u/7 carbs in the morning & 1u/9 carbs after noon; Labs ordered, will be monitored by endocrinology & MFM               - Growth US every 4 weeks @ 24w & Twice Weekly BPP's @ 30 weeks                - Pre-eclamptic labs every trimester               - Induction @ 37w- 39w depending on glucose control               - MFM consult, EKG in [place] on 2018, ophthamology appointment         , PIH labs and urine random protein 2018.                            > MFM advised daily aspirin  4) Return: In 2 weeks         Kai    "

## 2018-12-31 ENCOUNTER — PRENATAL OFFICE VISIT (OUTPATIENT)
Dept: OBGYN | Facility: CLINIC | Age: 33
End: 2018-12-31
Payer: COMMERCIAL

## 2018-12-31 VITALS
HEIGHT: 66 IN | BODY MASS INDEX: 29.33 KG/M2 | DIASTOLIC BLOOD PRESSURE: 62 MMHG | WEIGHT: 182.5 LBS | SYSTOLIC BLOOD PRESSURE: 112 MMHG

## 2018-12-31 DIAGNOSIS — Z34.93 PRENATAL CARE IN THIRD TRIMESTER: Primary | ICD-10-CM

## 2018-12-31 PROCEDURE — 59425 ANTEPARTUM CARE ONLY: CPT | Performed by: FAMILY MEDICINE

## 2018-12-31 PROCEDURE — 99207 ZZC PRENATAL VISIT: CPT | Performed by: FAMILY MEDICINE

## 2018-12-31 ASSESSMENT — MIFFLIN-ST. JEOR: SCORE: 1559.56

## 2018-12-31 NOTE — PATIENT INSTRUCTIONS
Return in 2 weeks   Return to clinic:  every 4 weeks till 30 weeks, then every 2 weeks till 36 weeks, then weekly till delivery      Phone numbers [place]:  Day/ night [phone] ask for ob triage  Emergency:  Call labor and delivery:  [phone]    What should I call about??    Contraction every 5 minutes for 1 hour 1 minute long (511), bleeding, loss of fluid, headache that doesn't resolve with tylenol, and decreased fetal movement     Start kick counts @ 26-28 weeks   Keep track of movement and discover your normal baby movement pattern   guideline is listed below  Please call if you do not feel the baby move!  We will have you come in for fetal heart rate monitoring:   Perception of at least 10 FMs during 12 hours of normal maternal activity   Perception of least 10 FMs over two hours when the mother is at rest and focused on [facility] Address   [address], [place], [place] [address] [phone]    Dr. Meghan Quinn, DO    OB/GYN   [facility] and [facility]

## 2018-12-31 NOTE — NURSING NOTE
"30w6d    Chief Complaint   Patient presents with     Prenatal Care     traveling this weekend and would like note--pt would like pump checked       Initial /62 (BP Location: Right arm, Patient Position: Sitting, Cuff Size: Adult Regular)   Ht 1.676 m (5' 6\")   Wt 82.8 kg (182 lb 8 oz)   LMP  (LMP Unknown)   BMI 29.46 kg/m   Estimated body mass index is 29.46 kg/m  as calculated from the following:    Height as of this encounter: 1.676 m (5' 6\").    Weight as of this encounter: 82.8 kg (182 lb 8 oz).  BP completed using cuff size: regular    Questioned patient about current smoking habits.  Pt. has never smoked.          The following HM Due: NONE      "

## 2018-12-31 NOTE — LETTER
12/31/2018     Guerline Rock  [address] [facility] [address]      To whom it may concern,       The above patient is pregnant and Estimated Date of Delivery: Mar 5, 2019   She is cleared medically to fly.       Sincerely,           Dr. Meghan Quinn, DO    OB/GYN   [facility] [phone]  [facility] [phone]      [facility]  [address] [place] [address]  Phone: [phone]

## 2019-01-08 ENCOUNTER — OFFICE VISIT (OUTPATIENT)
Dept: MATERNAL FETAL MEDICINE | Facility: CLINIC | Age: 34
End: 2019-01-08
Attending: OBSTETRICS & GYNECOLOGY
Payer: COMMERCIAL

## 2019-01-08 ENCOUNTER — HOSPITAL ENCOUNTER (OUTPATIENT)
Dept: ULTRASOUND IMAGING | Facility: CLINIC | Age: 34
Discharge: HOME | End: 2019-01-08
Attending: OBSTETRICS & GYNECOLOGY | Admitting: OBSTETRICS & GYNECOLOGY
Payer: COMMERCIAL

## 2019-01-08 DIAGNOSIS — O24.013 TYPE 1 DIABETES MELLITUS COMPLICATING PREGNANCY, ANTEPARTUM, THIRD TRIMESTER: Primary | ICD-10-CM

## 2019-01-08 DIAGNOSIS — O24.013 TYPE 1 DIABETES MELLITUS COMPLICATING PREGNANCY, ANTEPARTUM, THIRD TRIMESTER: ICD-10-CM

## 2019-01-08 PROCEDURE — 76819 FETAL BIOPHYS PROFIL W/O NST: CPT

## 2019-01-08 PROCEDURE — 76816 OB US FOLLOW-UP PER FETUS: CPT

## 2019-01-08 NOTE — PROGRESS NOTES
Please see full imaging report from ViewPoint program under imaging tab.    BPP 8/8, normal growth.     Arina Mayfield MD  Maternal Fetal Medicine

## 2019-01-11 ENCOUNTER — HOSPITAL ENCOUNTER (OUTPATIENT)
Dept: ULTRASOUND IMAGING | Facility: CLINIC | Age: 34
Discharge: HOME | End: 2019-01-11
Attending: OBSTETRICS & GYNECOLOGY | Admitting: OBSTETRICS & GYNECOLOGY
Payer: COMMERCIAL

## 2019-01-11 ENCOUNTER — OFFICE VISIT (OUTPATIENT)
Dept: MATERNAL FETAL MEDICINE | Facility: CLINIC | Age: 34
End: 2019-01-11
Attending: OBSTETRICS & GYNECOLOGY
Payer: COMMERCIAL

## 2019-01-11 DIAGNOSIS — O24.013 TYPE 1 DIABETES MELLITUS COMPLICATING PREGNANCY, ANTEPARTUM, THIRD TRIMESTER: ICD-10-CM

## 2019-01-11 DIAGNOSIS — O24.013 TYPE 1 DIABETES MELLITUS COMPLICATING PREGNANCY, ANTEPARTUM, THIRD TRIMESTER: Primary | ICD-10-CM

## 2019-01-11 PROCEDURE — 76819 FETAL BIOPHYS PROFIL W/O NST: CPT

## 2019-01-11 NOTE — PROGRESS NOTES
"Please see \"Imaging\" tab under Chart Review for full details.    The BPP is 8/8.    Shirley Costa MD  Maternal Fetal Medicine    "

## 2019-01-15 ENCOUNTER — HOSPITAL ENCOUNTER (OUTPATIENT)
Dept: ULTRASOUND IMAGING | Facility: CLINIC | Age: 34
Discharge: HOME | End: 2019-01-15
Attending: OBSTETRICS & GYNECOLOGY | Admitting: OBSTETRICS & GYNECOLOGY
Payer: COMMERCIAL

## 2019-01-15 ENCOUNTER — PRENATAL OFFICE VISIT (OUTPATIENT)
Dept: OBGYN | Facility: CLINIC | Age: 34
End: 2019-01-15
Payer: COMMERCIAL

## 2019-01-15 ENCOUNTER — OFFICE VISIT (OUTPATIENT)
Dept: MATERNAL FETAL MEDICINE | Facility: CLINIC | Age: 34
End: 2019-01-15
Attending: OBSTETRICS & GYNECOLOGY
Payer: COMMERCIAL

## 2019-01-15 VITALS
WEIGHT: 186 LBS | SYSTOLIC BLOOD PRESSURE: 104 MMHG | BODY MASS INDEX: 29.89 KG/M2 | DIASTOLIC BLOOD PRESSURE: 60 MMHG | HEIGHT: 66 IN

## 2019-01-15 DIAGNOSIS — Z34.93 PRENATAL CARE IN THIRD TRIMESTER: ICD-10-CM

## 2019-01-15 DIAGNOSIS — O24.013 TYPE 1 DIABETES MELLITUS COMPLICATING PREGNANCY, ANTEPARTUM, THIRD TRIMESTER: Primary | ICD-10-CM

## 2019-01-15 DIAGNOSIS — O24.013 TYPE 1 DIABETES MELLITUS COMPLICATING PREGNANCY, ANTEPARTUM, THIRD TRIMESTER: ICD-10-CM

## 2019-01-15 DIAGNOSIS — E10.9 TYPE 1 DIABETES MELLITUS WITHOUT COMPLICATION (H): ICD-10-CM

## 2019-01-15 DIAGNOSIS — O09.93 HIGH-RISK PREGNANCY IN THIRD TRIMESTER: Primary | ICD-10-CM

## 2019-01-15 PROCEDURE — 99212 OFFICE O/P EST SF 10 MIN: CPT | Performed by: FAMILY MEDICINE

## 2019-01-15 PROCEDURE — 76819 FETAL BIOPHYS PROFIL W/O NST: CPT

## 2019-01-15 ASSESSMENT — MIFFLIN-ST. JEOR: SCORE: 1575.44

## 2019-01-15 NOTE — PROGRESS NOTES
"CC: Here for routine prenatal visit   33 year old y/o  @ 33w0d with Estimated Date of Delivery: Mar 5, 2019   HPI: Pt is doing well, states there has not been much change in her sugars [fasting averages around 180, breakfast around 200, other meals around 100] & the MFM US showed she was growing an \"average\" baby.        This document serves as a record of the services and decisions personally performed and made by Meghan Quinn DO. It was created on her behalf by Genet Ramesh, a trained medical scribe. The creation of this document is based the provider's statements to the medical scribe.  Scribe Genet Ramesh 2:38 PM, January 15, 2019    /60 (BP Location: Left arm, Patient Position: Sitting, Cuff Size: Adult Regular)   Ht 1.676 m (5' 6\")   Wt 84.4 kg (186 lb)   LMP  (LMP Unknown)   BMI 30.02 kg/m    See OB flowsheet  + fetal movement, no contractions, no bleeding, no loss of fluid   Discussed monitoring fetal movement - aware of kick counts, reports good movement today      1) concerns: No concerns today  2) Routine care: Girl; Desires first trimester screening; GC - negative; Pap - normal  3) Type I Diabetes,uncontrolled, glucose @ 170-200s : Insulin - 20u at night & 1u/7 carbs in the morning & 1u/9 carbs after noon; Labs ordered, will be monitored by endocrinology & MFM               - Growth US every 4 weeks @ 24w & Twice Weekly BPP's @ 30 weeks                - Pre-eclamptic labs every trimester               - Induction @ 37w- 39w depending on glucose control               - MFM consult, EKG in [place] on 2018, ophthamology appointment  , PIH labs and urine random protein 2018.                            > MFM advised daily aspirin  4) Return: 2 weeks        The information in this document, created by the medical scribe for me, accurately reflects the services I personally performed and the decisions made by me. I have reviewed and approved this document for accuracy prior " to leaving the patient care area.  2:38 PM, 01/15/19    Kai

## 2019-01-15 NOTE — NURSING NOTE
"33w0d    Chief Complaint   Patient presents with     Prenatal Care     had MFM and endocrinologist today       Initial /60 (BP Location: Left arm, Patient Position: Sitting, Cuff Size: Adult Regular)   Ht 1.676 m (5' 6\")   Wt 84.4 kg (186 lb)   LMP  (LMP Unknown)   BMI 30.02 kg/m   Estimated body mass index is 30.02 kg/m  as calculated from the following:    Height as of this encounter: 1.676 m (5' 6\").    Weight as of this encounter: 84.4 kg (186 lb).  BP completed using cuff size: regular    Questioned patient about current smoking habits.  Pt. has never smoked.          The following HM Due: NONE         "

## 2019-01-15 NOTE — PATIENT INSTRUCTIONS
Return in 2 weeks  Return to clinic:  every 4 weeks till 30 weeks, then every 2 weeks till 36 weeks, then weekly till delivery      Phone numbers [place]:  Day/ night [phone] ask for ob triage  Emergency:  Call labor and delivery:  [phone]    What should I call about??    Contraction every 5 minutes for 1 hour 1 minute long (511), bleeding, loss of fluid, headache that doesn't resolve with tylenol, and decreased fetal movement     Start kick counts @ 26-28 weeks   Keep track of movement and discover your normal baby movement pattern   guideline is listed below  Please call if you do not feel the baby move!  We will have you come in for fetal heart rate monitoring:   Perception of at least 10 FMs during 12 hours of normal maternal activity   Perception of least 10 FMs over two hours when the mother is at rest and focused on [facility] Address   [address], [place], [place] [address] [phone]    Dr. Meghan Quinn, DO    OB/GYN   [facility] and [facility]

## 2019-01-15 NOTE — PROGRESS NOTES
Please refer to ultrasound report under 'Imaging' Studies of 'Chart Review' tabs.    Tyson Garrett M.D.

## 2019-01-17 ENCOUNTER — TRANSFERRED RECORDS (OUTPATIENT)
Dept: HEALTH INFORMATION MANAGEMENT | Facility: CLINIC | Age: 34
End: 2019-01-17

## 2019-01-18 ENCOUNTER — OFFICE VISIT (OUTPATIENT)
Dept: MATERNAL FETAL MEDICINE | Facility: CLINIC | Age: 34
End: 2019-01-18
Attending: OBSTETRICS & GYNECOLOGY
Payer: COMMERCIAL

## 2019-01-18 ENCOUNTER — HOSPITAL ENCOUNTER (OUTPATIENT)
Dept: ULTRASOUND IMAGING | Facility: CLINIC | Age: 34
Discharge: HOME | End: 2019-01-18
Attending: OBSTETRICS & GYNECOLOGY | Admitting: OBSTETRICS & GYNECOLOGY
Payer: COMMERCIAL

## 2019-01-18 DIAGNOSIS — O24.013 TYPE 1 DIABETES MELLITUS COMPLICATING PREGNANCY, ANTEPARTUM, THIRD TRIMESTER: Primary | ICD-10-CM

## 2019-01-18 DIAGNOSIS — O24.013 TYPE 1 DIABETES MELLITUS COMPLICATING PREGNANCY, ANTEPARTUM, THIRD TRIMESTER: ICD-10-CM

## 2019-01-18 PROCEDURE — 76819 FETAL BIOPHYS PROFIL W/O NST: CPT

## 2019-01-18 NOTE — PROGRESS NOTES
"Please see \"Imaging\" tab under \"Chart Review\" for details of today's US.    Osiris Recio, DO    "

## 2019-01-22 ENCOUNTER — HOSPITAL ENCOUNTER (OUTPATIENT)
Dept: ULTRASOUND IMAGING | Facility: CLINIC | Age: 34
Discharge: HOME | End: 2019-01-22
Attending: OBSTETRICS & GYNECOLOGY | Admitting: OBSTETRICS & GYNECOLOGY
Payer: COMMERCIAL

## 2019-01-22 ENCOUNTER — OFFICE VISIT (OUTPATIENT)
Dept: MATERNAL FETAL MEDICINE | Facility: CLINIC | Age: 34
End: 2019-01-22
Attending: OBSTETRICS & GYNECOLOGY
Payer: COMMERCIAL

## 2019-01-22 DIAGNOSIS — O24.319 PREGESTATIONAL DIABETES MELLITUS, MODIFIED WHITE CLASS B: Primary | ICD-10-CM

## 2019-01-22 DIAGNOSIS — O24.013 TYPE 1 DIABETES MELLITUS COMPLICATING PREGNANCY, ANTEPARTUM, THIRD TRIMESTER: ICD-10-CM

## 2019-01-22 PROCEDURE — 76819 FETAL BIOPHYS PROFIL W/O NST: CPT

## 2019-01-22 NOTE — PROGRESS NOTES
Please see full imaging report from ViewPoint program under imaging tab.    BPP 8/8.    Arina Mayfield MD  Maternal Fetal Medicine

## 2019-01-25 ENCOUNTER — HOSPITAL ENCOUNTER (OUTPATIENT)
Dept: ULTRASOUND IMAGING | Facility: CLINIC | Age: 34
Discharge: HOME | End: 2019-01-25
Attending: OBSTETRICS & GYNECOLOGY | Admitting: OBSTETRICS & GYNECOLOGY
Payer: COMMERCIAL

## 2019-01-25 ENCOUNTER — OFFICE VISIT (OUTPATIENT)
Dept: MATERNAL FETAL MEDICINE | Facility: CLINIC | Age: 34
End: 2019-01-25
Attending: OBSTETRICS & GYNECOLOGY
Payer: COMMERCIAL

## 2019-01-25 DIAGNOSIS — O24.013 TYPE 1 DIABETES MELLITUS COMPLICATING PREGNANCY, ANTEPARTUM, THIRD TRIMESTER: ICD-10-CM

## 2019-01-25 DIAGNOSIS — O24.319 PREGESTATIONAL DIABETES MELLITUS, MODIFIED WHITE CLASS B: Primary | ICD-10-CM

## 2019-01-25 PROCEDURE — 76819 FETAL BIOPHYS PROFIL W/O NST: CPT

## 2019-01-28 ENCOUNTER — PRENATAL OFFICE VISIT (OUTPATIENT)
Dept: OBGYN | Facility: CLINIC | Age: 34
End: 2019-01-28
Payer: COMMERCIAL

## 2019-01-28 VITALS
SYSTOLIC BLOOD PRESSURE: 112 MMHG | TEMPERATURE: 98.4 F | WEIGHT: 186.8 LBS | HEIGHT: 66 IN | DIASTOLIC BLOOD PRESSURE: 70 MMHG | BODY MASS INDEX: 30.02 KG/M2

## 2019-01-28 DIAGNOSIS — J20.9 ACUTE BRONCHITIS, UNSPECIFIED ORGANISM: ICD-10-CM

## 2019-01-28 DIAGNOSIS — Z34.93 PRENATAL CARE IN THIRD TRIMESTER: Primary | ICD-10-CM

## 2019-01-28 PROCEDURE — 99212 OFFICE O/P EST SF 10 MIN: CPT | Performed by: FAMILY MEDICINE

## 2019-01-28 PROCEDURE — 87653 STREP B DNA AMP PROBE: CPT | Performed by: FAMILY MEDICINE

## 2019-01-28 PROCEDURE — 87186 SC STD MICRODIL/AGAR DIL: CPT | Performed by: FAMILY MEDICINE

## 2019-01-28 RX ORDER — AMOXICILLIN 500 MG/1
500 CAPSULE ORAL 2 TIMES DAILY
Qty: 14 CAPSULE | Refills: 0 | Status: ON HOLD | OUTPATIENT
Start: 2019-01-28 | End: 2019-02-08

## 2019-01-28 ASSESSMENT — MIFFLIN-ST. JEOR: SCORE: 1579.07

## 2019-01-28 NOTE — PROGRESS NOTES
"CC: Here for routine prenatal visit   33 year old y/o  @ 34w6d with Estimated Date of Delivery: Mar 5, 2019   HPI: Pt is doing well, expresses no concerns at today's visit.         This document serves as a record of the services and decisions personally performed and made by Meghan Quinn DO. It was created on her behalf by Genet Ramesh, a trained medical scribe. The creation of this document is based the provider's statements to the medical scribe.  Scribe Genet Ramesh 11:20 AM, 2019    /70 (BP Location: Right arm, Patient Position: Sitting, Cuff Size: Adult Regular)   Temp 98.4  F (36.9  C) (Oral)   Ht 1.676 m (5' 6\")   Wt 84.7 kg (186 lb 12.8 oz)   LMP  (LMP Unknown)   BMI 30.15 kg/m    See OB flowsheet  + fetal movement, no contractions, no bleeding, no loss of fluid   Discussed monitoring fetal movement - aware of kick counts, reports good movement today      1) concerns: No concerns today  2) Routine care: Girl; Desires first trimester screening; GC - negative; Pap - normal; GBS - pending  3) Type I Diabetes,uncontrolled, glucose @ 170-200s : Insulin - 20u at night & 1u/6 carbs in the morning & 1u/9 carbs after noon; Labs ordered, will be monitored by endocrinology & MFM               - Growth US every 4 weeks @ 24w & Twice Weekly BPP's @ 30 weeks                - Pre-eclamptic labs every trimester               - Induction @ 37w- 39w depending on glucose control               - MFM consult, EKG in [place] on 2018, ophthamology appointment  , PIH labs and urine random protein 2018.                            > MFM advised daily aspirin  4) Return: Weekly        The information in this document, created by the medical scribe for me, accurately reflects the services I personally performed and the decisions made by me. I have reviewed and approved this document for accuracy prior to leaving the patient care area.  11:20 AM, 19    Kai    "

## 2019-01-28 NOTE — NURSING NOTE
"34w6d    Chief Complaint   Patient presents with     Prenatal Care     GBS due--pt has a cold--congested       Initial /70 (BP Location: Right arm, Patient Position: Sitting, Cuff Size: Adult Regular)   Temp 98.4  F (36.9  C) (Oral)   Ht 1.676 m (5' 6\")   Wt 84.7 kg (186 lb 12.8 oz)   LMP  (LMP Unknown)   BMI 30.15 kg/m   Estimated body mass index is 30.15 kg/m  as calculated from the following:    Height as of this encounter: 1.676 m (5' 6\").    Weight as of this encounter: 84.7 kg (186 lb 12.8 oz).  BP completed using cuff size: regular    Questioned patient about current smoking habits.  Pt. has never smoked.          The following HM Due: NONE        "

## 2019-01-28 NOTE — PATIENT INSTRUCTIONS
Return weekly   Return to clinic:  every 4 weeks till 30 weeks, then every 2 weeks till 36 weeks, then weekly till delivery      Phone numbers [place]:  Day/ night [phone] ask for ob triage  Emergency:  Call labor and delivery:  [phone]    What should I call about??    Contraction every 5 minutes for 1 hour 1 minute long (511), bleeding, loss of fluid, headache that doesn't resolve with tylenol, and decreased fetal movement     Start kick counts @ 26-28 weeks   Keep track of movement and discover your normal baby movement pattern   guideline is listed below  Please call if you do not feel the baby move!  We will have you come in for fetal heart rate monitoring:   Perception of at least 10 FMs during 12 hours of normal maternal activity   Perception of least 10 FMs over two hours when the mother is at rest and focused on [facility] Address   [address], [place], [place] [address] [phone]    Dr. Meghan Quinn, DO    OB/GYN   [facility]

## 2019-01-29 ENCOUNTER — OFFICE VISIT (OUTPATIENT)
Dept: MATERNAL FETAL MEDICINE | Facility: CLINIC | Age: 34
End: 2019-01-29
Attending: OBSTETRICS & GYNECOLOGY
Payer: COMMERCIAL

## 2019-01-29 ENCOUNTER — HOSPITAL ENCOUNTER (OUTPATIENT)
Dept: ULTRASOUND IMAGING | Facility: CLINIC | Age: 34
Discharge: HOME | End: 2019-01-29
Attending: OBSTETRICS & GYNECOLOGY | Admitting: OBSTETRICS & GYNECOLOGY
Payer: COMMERCIAL

## 2019-01-29 DIAGNOSIS — O24.013 TYPE 1 DIABETES MELLITUS COMPLICATING PREGNANCY, ANTEPARTUM, THIRD TRIMESTER: ICD-10-CM

## 2019-01-29 DIAGNOSIS — O24.319 PREGESTATIONAL DIABETES MELLITUS, MODIFIED WHITE CLASS B: Primary | ICD-10-CM

## 2019-01-29 LAB
GP B STREP DNA SPEC QL NAA+PROBE: POSITIVE
SPECIMEN SOURCE: ABNORMAL

## 2019-01-29 PROCEDURE — 76819 FETAL BIOPHYS PROFIL W/O NST: CPT

## 2019-01-29 NOTE — PROGRESS NOTES
Please see full imaging report from ViewPoint program under imaging tab.    BPP 8/8    Arina Mayfield MD  Maternal Fetal Medicine

## 2019-02-01 ENCOUNTER — OFFICE VISIT (OUTPATIENT)
Dept: MATERNAL FETAL MEDICINE | Facility: CLINIC | Age: 34
End: 2019-02-01
Attending: OBSTETRICS & GYNECOLOGY
Payer: COMMERCIAL

## 2019-02-01 ENCOUNTER — TELEPHONE (OUTPATIENT)
Dept: OBGYN | Facility: CLINIC | Age: 34
End: 2019-02-01

## 2019-02-01 ENCOUNTER — HOSPITAL ENCOUNTER (OUTPATIENT)
Dept: ULTRASOUND IMAGING | Facility: CLINIC | Age: 34
Discharge: HOME | End: 2019-02-01
Attending: OBSTETRICS & GYNECOLOGY | Admitting: OBSTETRICS & GYNECOLOGY
Payer: COMMERCIAL

## 2019-02-01 DIAGNOSIS — B37.31 YEAST INFECTION OF THE VAGINA: Primary | ICD-10-CM

## 2019-02-01 DIAGNOSIS — O24.013 TYPE 1 DIABETES MELLITUS COMPLICATING PREGNANCY, ANTEPARTUM, THIRD TRIMESTER: ICD-10-CM

## 2019-02-01 DIAGNOSIS — O24.013 TYPE 1 DIABETES MELLITUS COMPLICATING PREGNANCY, ANTEPARTUM, THIRD TRIMESTER: Primary | ICD-10-CM

## 2019-02-01 LAB
BACTERIA SPEC CULT: ABNORMAL
SPECIMEN SOURCE: ABNORMAL

## 2019-02-01 PROCEDURE — 76819 FETAL BIOPHYS PROFIL W/O NST: CPT

## 2019-02-01 RX ORDER — FLUCONAZOLE 150 MG/1
150 TABLET ORAL ONCE
Qty: 1 TABLET | Refills: 0 | Status: ON HOLD | OUTPATIENT
Start: 2019-02-01 | End: 2019-02-08

## 2019-02-01 NOTE — TELEPHONE ENCOUNTER
Spoke with pt, informed her that med was sent.  Sunitha MUÑOZ R.N.  [facility]

## 2019-02-01 NOTE — PROGRESS NOTES
"Please see \"Imaging\" tab under \"Chart Review\" for details of today's US.      Suzie Isaac, DO  Maternal-Fetal Medicine        "

## 2019-02-01 NOTE — TELEPHONE ENCOUNTER
35w3d    Pt was rx'd amoxicillin by Dr Quinn on 1/28/19.  She had vaginal itching and discomfort all night last night.    Okay to send in diflucan?    Sunitha MUÑOZ R.N.  [facility]

## 2019-02-04 ENCOUNTER — PRENATAL OFFICE VISIT (OUTPATIENT)
Dept: OBGYN | Facility: CLINIC | Age: 34
End: 2019-02-04
Payer: COMMERCIAL

## 2019-02-04 VITALS
BODY MASS INDEX: 30.04 KG/M2 | WEIGHT: 186.9 LBS | DIASTOLIC BLOOD PRESSURE: 72 MMHG | SYSTOLIC BLOOD PRESSURE: 112 MMHG | HEIGHT: 66 IN

## 2019-02-04 DIAGNOSIS — E10.9 TYPE 1 DIABETES MELLITUS WITHOUT COMPLICATION (H): ICD-10-CM

## 2019-02-04 DIAGNOSIS — B37.31 YEAST INFECTION OF THE VAGINA: Primary | ICD-10-CM

## 2019-02-04 LAB
SPECIMEN SOURCE: NORMAL
WET PREP SPEC: NORMAL

## 2019-02-04 PROCEDURE — 99212 OFFICE O/P EST SF 10 MIN: CPT | Performed by: FAMILY MEDICINE

## 2019-02-04 PROCEDURE — 87210 SMEAR WET MOUNT SALINE/INK: CPT | Performed by: FAMILY MEDICINE

## 2019-02-04 ASSESSMENT — MIFFLIN-ST. JEOR: SCORE: 1579.52

## 2019-02-04 NOTE — PATIENT INSTRUCTIONS
Return weekly     Return to clinic:  every 4 weeks till 30 weeks, then every 2 weeks till 36 weeks, then weekly till delivery      Phone numbers [place]:  Day/ night [phone] ask for ob triage  Emergency:  Call labor and delivery:  [phone]    What should I call about??    Contraction every 5 minutes for 1 hour 1 minute long (511), bleeding, loss of fluid, headache that doesn't resolve with tylenol, and decreased fetal movement     Start kick counts @ 26-28 weeks   Keep track of movement and discover your normal baby movement pattern   guideline is listed below  Please call if you do not feel the baby move!  We will have you come in for fetal heart rate monitoring:   Perception of at least 10 FMs during 12 hours of normal maternal activity   Perception of least 10 FMs over two hours when the mother is at rest and focused on [facility] Address   [address], [place], [place] [address] [phone]    Dr. Meghan Quinn, DO    OB/GYN   [facility] and [facility]

## 2019-02-04 NOTE — PROGRESS NOTES
"CC: Here for routine prenatal visit   33 year old y/o  @ 35w6d with Estimated Date of Delivery: Mar 5, 2019   HPI: Pt reports that all of her sugars after eating have been elevated, in the high 200's [270-280]. Her fasting sugar average for the past week is 164, with 70% of the time being 120-240.     Pt also reports that she was treated for a yeast infection with 1 tab diflucan, but is still experiencing a lot of itching & discomfort.         This document serves as a record of the services and decisions personally performed and made by Meghan Quinn DO. It was created on her behalf by Genet Ramesh, a trained medical scribe. The creation of this document is based the provider's statements to the medical scribe.  Scribe Genet Ramesh 1:33 PM, 2019    /72 (BP Location: Right arm, Patient Position: Sitting, Cuff Size: Adult Regular)   Ht 1.676 m (5' 6\")   Wt 84.8 kg (186 lb 14.4 oz)   LMP  (LMP Unknown)   BMI 30.17 kg/m    See OB flowsheet  + fetal movement, no contractions, no bleeding, no loss of fluid   Discussed monitoring fetal movement - aware of kick counts, reports good movement today      1) concerns: Elevated glucose -- consulted with MFM to determine idea induction gestational age   With poor control deliver after 34 weeks if failed inpatient or if she declines the inpatient treatment, then option is delivery at this time can be considered to prevent stillbirth.  D/w patient  On the phone, she will talk with endocrinology and get back to me with her desire.  She called back and endocrinology changed her doses, she declines inpatient, but will update me by in the am with progress overnight.  BPP tomorrow 930.  Discussed risk of stillbirth with elevated glucoses. She desires to try to get to 37 weeks, but understands if no improvement delivery will need to be done this weekl with delivery by 37 weeks for sure!    Patient will be called with information/instructions   - Whatley score " of 6 today in clinic  Yeast infection -- Wet prep pending, miconazole prescribed to treat symptoms  2) Routine care: Girl; Desires first trimester screening; GC - negative; Pap - normal; GBS - positive  3) Type I Diabetes,uncontrolled, glucose @ 170-200s : Insulin - 20u at night & 1u/6 carbs in the morning & 1u/9 carbs after noon; Labs ordered, will be monitored by endocrinology & MFM.  Endocrinology managing pump.               - Growth US every 4 weeks @ 24w & Twice Weekly BPP's @ 30 weeks                - Pre-eclamptic labs every trimester               - Induction @ 37w- 39w depending on glucose control               - MFM consult, EKG in [place] on 2018, ophthamology appointment , PIH labs and urine random protein 2018.                            > MFM advised daily aspirin   - post delivery insulin plan per endocrinology:  Sheet is also on labor and delivery under Guerline Rock's name    Was faxed to labor and delivery     1) maximum rate 2 unit(s)/hour    2) basal rate 1==> 0.8 unit(s)/hour     3) Basal program ==> 12-3 am 0.8 unit(s)/hour       3-9 am 0.7 unit(s)/hour        9 am to 12 am 0.8 unit(s)/hour    4) temporary basal rate set to %     5) BG sounds on     6) BG goal limits 70 mg/dl lower limit      140 mg/dl upper limit     7)  Suggested bolus calculations, turn on for PDM to suggest boluses based on settings      A) target blood glucose 12 am-7am 110-120- correct above        7 am to 10 pm  correct above         10 pm to 12 am 110-120 correct above      B) minimum BG for bolus ca mg/dl     C) insulin to card ratio 12 am to 11 am ==>  8 g/carb                         11 am to 12 am ==> 12 g/carb      D)  Correction factor  1 unit of insulin decreases blood glucose 50 mg/dl      E) REverse correction off      F) Duration of insulin action 3 hours     8) Bolus increment 0.05 units     9) Maximum bolus 15 units     10) Extended bolus off     11) low volume reservoir alert 10  units     12) Expiration alert 8 hours        1      4) Return: Weekly      Rx:  - miconazole 1200 & 2 mg & % kit, Place one dose vaginally        The information in this document, created by the medical scribe for me, accurately reflects the services I personally performed and the decisions made by me. I have reviewed and approved this document for accuracy prior to leaving the patient care area.  1:33 PM, 02/04/19    Kai

## 2019-02-05 ENCOUNTER — TELEPHONE (OUTPATIENT)
Dept: OBGYN | Facility: CLINIC | Age: 34
End: 2019-02-05

## 2019-02-05 ENCOUNTER — OFFICE VISIT (OUTPATIENT)
Dept: MATERNAL FETAL MEDICINE | Facility: CLINIC | Age: 34
End: 2019-02-05
Attending: OBSTETRICS & GYNECOLOGY
Payer: COMMERCIAL

## 2019-02-05 ENCOUNTER — HOSPITAL ENCOUNTER (OUTPATIENT)
Facility: CLINIC | Age: 34
Setting detail: OBSERVATION
Discharge: HOME | End: 2019-02-06
Attending: OBSTETRICS & GYNECOLOGY | Admitting: OBSTETRICS & GYNECOLOGY
Payer: COMMERCIAL

## 2019-02-05 ENCOUNTER — HOSPITAL ENCOUNTER (OUTPATIENT)
Dept: ULTRASOUND IMAGING | Facility: CLINIC | Age: 34
Discharge: HOME | End: 2019-02-05
Attending: OBSTETRICS & GYNECOLOGY | Admitting: OBSTETRICS & GYNECOLOGY
Payer: COMMERCIAL

## 2019-02-05 DIAGNOSIS — O24.013 TYPE 1 DIABETES MELLITUS COMPLICATING PREGNANCY, ANTEPARTUM, THIRD TRIMESTER: ICD-10-CM

## 2019-02-05 DIAGNOSIS — O24.319 PREGESTATIONAL DIABETES MELLITUS, MODIFIED WHITE CLASS C: Primary | ICD-10-CM

## 2019-02-05 LAB
GLUCOSE BLDC GLUCOMTR-MCNC: 128 MG/DL (ref 70–99)
GLUCOSE BLDC GLUCOMTR-MCNC: 156 MG/DL (ref 70–99)

## 2019-02-05 PROCEDURE — 76819 FETAL BIOPHYS PROFIL W/O NST: CPT | Performed by: OBSTETRICS & GYNECOLOGY

## 2019-02-05 PROCEDURE — 76816 OB US FOLLOW-UP PER FETUS: CPT

## 2019-02-05 PROCEDURE — 59025 FETAL NON-STRESS TEST: CPT | Mod: 76

## 2019-02-05 PROCEDURE — G0463 HOSPITAL OUTPT CLINIC VISIT: HCPCS | Mod: 25

## 2019-02-05 PROCEDURE — 59025 FETAL NON-STRESS TEST: CPT

## 2019-02-05 PROCEDURE — 25000132 ZZH RX MED GY IP 250 OP 250 PS 637: Performed by: OBSTETRICS & GYNECOLOGY

## 2019-02-05 PROCEDURE — 99220 ZZC INITIAL OBSERVATION CARE,LEVL III: CPT | Performed by: INTERNAL MEDICINE

## 2019-02-05 PROCEDURE — 40000647 ZZH STATISTIC GLUCOSE MONITORING

## 2019-02-05 PROCEDURE — 99207 ZZC CONSULT E&M CHANGED TO INITIAL LEVEL: CPT | Performed by: INTERNAL MEDICINE

## 2019-02-05 PROCEDURE — G0378 HOSPITAL OBSERVATION PER HR: HCPCS

## 2019-02-05 PROCEDURE — 40000809 ZZH STATISTIC NO DOCUMENTATION TO SUPPORT CHARGE

## 2019-02-05 PROCEDURE — 00000146 ZZHCL STATISTIC GLUCOSE BY METER IP

## 2019-02-05 PROCEDURE — 99218 ZZC INITIAL OBSERVATION CARE,LEVL I: CPT | Performed by: OBSTETRICS & GYNECOLOGY

## 2019-02-05 RX ORDER — DEXTROSE MONOHYDRATE 25 G/50ML
25-50 INJECTION, SOLUTION INTRAVENOUS
Status: DISCONTINUED | OUTPATIENT
Start: 2019-02-05 | End: 2019-02-06 | Stop reason: HOSPADM

## 2019-02-05 RX ORDER — NICOTINE POLACRILEX 4 MG
15-30 LOZENGE BUCCAL
Status: DISCONTINUED | OUTPATIENT
Start: 2019-02-05 | End: 2019-02-06 | Stop reason: HOSPADM

## 2019-02-05 RX ORDER — ZOLPIDEM TARTRATE 5 MG/1
5 TABLET ORAL
Status: DISCONTINUED | OUTPATIENT
Start: 2019-02-05 | End: 2019-02-06 | Stop reason: HOSPADM

## 2019-02-05 RX ADMIN — ZOLPIDEM TARTRATE 5 MG: 5 TABLET, FILM COATED ORAL at 22:03

## 2019-02-05 ASSESSMENT — MIFFLIN-ST. JEOR: SCORE: 1582.25

## 2019-02-05 NOTE — TELEPHONE ENCOUNTER
Patient  With hypo and hyperglycemic episodes, if uncontrolled or elevated MFM recommends delivery,   She just got new insulin orders and now with only hypo or hyperglycemic episodes   Recommend for her to come in for monitoring   Of glucoses and continuous fetal monitoring   Labor and delivery notified, patient  Sent in   Dr. Meghan Quinn,     Obstetrics and Gynecology  [facility] [place] and [place]

## 2019-02-05 NOTE — PROGRESS NOTES
Please see full imaging report from [facility] program under imaging tab.    Findings reviewed with Guerline and her partner today. I also reviewed her case earlier this week with Dr. Quinn. Guerline is struggling with glycemic control despite her insulin pump and has had repeated glucose values > 200 over the last week. Given her poor glycemic control this does place her at a higher risk for stillbirth and I do recommend delivery at [id number]-55l1m. However, this may need to be done even earlier if her glucose values are consistently > 200 and efforts to improve glycemic control are unsuccessful. Guerline will contact Dr. Quinn with her glycemic control over the next few days and will follow up here for a BPP if undelivered.     Arina Mayfield MD  Maternal Fetal Medicine

## 2019-02-06 VITALS
RESPIRATION RATE: 16 BRPM | TEMPERATURE: 98.3 F | HEART RATE: 77 BPM | WEIGHT: 184 LBS | SYSTOLIC BLOOD PRESSURE: 108 MMHG | DIASTOLIC BLOOD PRESSURE: 54 MMHG | HEIGHT: 67 IN | BODY MASS INDEX: 28.88 KG/M2

## 2019-02-06 LAB
ANION GAP SERPL CALCULATED.3IONS-SCNC: 6 MMOL/L (ref 3–14)
BUN SERPL-MCNC: 13 MG/DL (ref 7–30)
CALCIUM SERPL-MCNC: 7.7 MG/DL (ref 8.5–10.1)
CHLORIDE SERPL-SCNC: 110 MMOL/L (ref 94–109)
CO2 SERPL-SCNC: 22 MMOL/L (ref 20–32)
CREAT SERPL-MCNC: 0.54 MG/DL (ref 0.52–1.04)
ERYTHROCYTE [DISTWIDTH] IN BLOOD BY AUTOMATED COUNT: 13 % (ref 10–15)
GFR SERPL CREATININE-BSD FRML MDRD: >90 ML/MIN/{1.73_M2}
GLUCOSE BLDC GLUCOMTR-MCNC: 130 MG/DL (ref 70–99)
GLUCOSE BLDC GLUCOMTR-MCNC: 132 MG/DL (ref 70–99)
GLUCOSE BLDC GLUCOMTR-MCNC: 133 MG/DL (ref 70–99)
GLUCOSE BLDC GLUCOMTR-MCNC: 141 MG/DL (ref 70–99)
GLUCOSE BLDC GLUCOMTR-MCNC: 150 MG/DL (ref 70–99)
GLUCOSE BLDC GLUCOMTR-MCNC: 164 MG/DL (ref 70–99)
GLUCOSE BLDC GLUCOMTR-MCNC: 166 MG/DL (ref 70–99)
GLUCOSE SERPL-MCNC: 117 MG/DL (ref 70–99)
HCT VFR BLD AUTO: 33.6 % (ref 35–47)
HGB BLD-MCNC: 11.3 G/DL (ref 11.7–15.7)
MCH RBC QN AUTO: 29.9 PG (ref 26.5–33)
MCHC RBC AUTO-ENTMCNC: 33.6 G/DL (ref 31.5–36.5)
MCV RBC AUTO: 89 FL (ref 78–100)
PLATELET # BLD AUTO: 219 10E9/L (ref 150–450)
POTASSIUM SERPL-SCNC: 3.8 MMOL/L (ref 3.4–5.3)
RBC # BLD AUTO: 3.78 10E12/L (ref 3.8–5.2)
SODIUM SERPL-SCNC: 138 MMOL/L (ref 133–144)
WBC # BLD AUTO: 7.5 10E9/L (ref 4–11)

## 2019-02-06 PROCEDURE — 59025 FETAL NON-STRESS TEST: CPT | Mod: 76

## 2019-02-06 PROCEDURE — 80048 BASIC METABOLIC PNL TOTAL CA: CPT | Performed by: INTERNAL MEDICINE

## 2019-02-06 PROCEDURE — 40000647 ZZH STATISTIC GLUCOSE MONITORING

## 2019-02-06 PROCEDURE — 36415 COLL VENOUS BLD VENIPUNCTURE: CPT | Performed by: INTERNAL MEDICINE

## 2019-02-06 PROCEDURE — G0378 HOSPITAL OBSERVATION PER HR: HCPCS

## 2019-02-06 PROCEDURE — 85027 COMPLETE CBC AUTOMATED: CPT | Performed by: INTERNAL MEDICINE

## 2019-02-06 PROCEDURE — 00000146 ZZHCL STATISTIC GLUCOSE BY METER IP

## 2019-02-06 PROCEDURE — 99217 ZZC OBSERVATION CARE DISCHARGE: CPT | Performed by: FAMILY MEDICINE

## 2019-02-06 PROCEDURE — 59025 FETAL NON-STRESS TEST: CPT | Mod: 26 | Performed by: OBSTETRICS & GYNECOLOGY

## 2019-02-06 PROCEDURE — 59025 FETAL NON-STRESS TEST: CPT

## 2019-02-06 NOTE — PLAN OF CARE
Reactive NST obtained prior to bedtime.  Patient able to sleep after receiving Ambien at HS.  Blood glucose at 0200 was 133 on Nova glucometer; Patient's Alexsandra monitor reading 134 at 0200.  Patient has made two insulin adjustments based on her Alexsandra monitor readings during shift.  Patient reports feeling better blood sugar stabilization since modifications to pump settings. Alexsandra blood sugar reading at 0620 was 131.  Morning serum glucose pending.  Night shift NST reactive.   at bedside overnight and supportive.

## 2019-02-06 NOTE — PROVIDER NOTIFICATION
02/06/19 1402   Provider Notification   Provider Name/Title Dr Quinn   Method of Notification Electronic Page   Notification Reason Lab/Diagnostic Study   updated re: BS

## 2019-02-06 NOTE — H&P
"  2019    Guerline Rock  [id number]            OB History & Physical      Ms. Rock  is here for intensive glucose monitoring and insulin management.    She was seen by Dr. Mayfield ([facility]) today for routine visit.  At her M visit, U/S showed EFW 3209 g (75%), AFV WNL, BPP 8/8, Vtx.  She informed [facility] that her glucoses have been repeatedly > 200 despite her best efforts to manage them with her insulin pump under the supervision of an outside endocrinologist (Dr. Rojas, [facility]).  She does state to me that her endo had her adjust her pump yesterday and she did get some pre-prandial BS's around 95.  But the [facility] recommends that given her BS's are so poorly controlled, that delivery between [id number] should be done due to IUFD risk, and possibly sooner than that if glucoses consistently > 200 despite intensive efforts to get it controlled.  [facility] advised Dr. Quinn of the situation, and recommended Pt be brought into L&D tonight for monitoring and insulin pump adjustment, and if BS's consistently > 200 may consider moving to delivery.    Patient's last menstrual period was 2018 (lmp unknown).   Her Estimated Date of Delivery: Mar 5, 2019, making her 36w0d.      Estimated body mass index is 28.82 kg/m  as calculated from the following:    Height as of this encounter: 1.702 m (5' 7\").    Weight as of this encounter: 83.5 kg (184 lb).  Her prenatal course has been w/ Dr. Kulkarni complicated by Class B IDDM as noted above using insulin pump and continuous glucose monitoring with endocrine and MFM following, normal Level 2 U/S.    See prenatal for labs.  Pos GBS, Rubella Immune, RH Positive         She is a 33 year old   Her OB history:   Obstetric History       T2      L2     SAB0   TAB0   Ectopic0   Multiple0   Live Births2       # Outcome Date GA Lbr Bernardo/2nd Weight Sex Delivery Anes PTL Lv   3 Current            2 Term 16 37w1d  4.269 kg (9 lb 6.6 oz) F Vag-Spont EPI N ALEJANDRA      " "Apgar1:  8                Apgar5: 9   1 Term 08/07/14 37w2d 07:15 / 05:00 3.73 kg (8 lb 3.6 oz) M Vag-Spont EPI  ALEJANDRA      Apgar1:  8                Apgar5: 9               Past Medical History:   Diagnosis Date     Diabetes mellitus type 1 (H) 1/16/2014          Past Surgical History:   Procedure Laterality Date     DISCECTOMY LUMBAR MINIMALLY INVASIVE ONE LEVEL  2010, 2001    L4-L5     EYE SURGERY       FOOT SURGERY           No current outpatient medications on file.       Allergies: Patient has no known allergies.      REVIEW OF SYSTEMS:  NEUROLOGIC:  Negative  EYES:  Negative  ENT:  Negative  GI:  Negative  :  Negative  GYN:  Negative  CV:  Negative  PULMONARY:  Negative  MUSCULOSKELETAL:  Negative  PSYCH:  Negative        Social History     Socioeconomic History     Marital status:      Spouse name: Not on file     Number of children: Not on file     Years of education: Not on file     Highest education level: Not on file   Social Needs     Financial resource strain: Not on file     Food insecurity - worry: Not on file     Food insecurity - inability: Not on file     Transportation needs - medical: Not on file     Transportation needs - non-medical: Not on file   Occupational History     Not on file   Tobacco Use     Smoking status: Never Smoker     Smokeless tobacco: Never Used   Substance and Sexual Activity     Alcohol use: No     Drug use: No     Sexual activity: Yes     Partners: Male   Other Topics Concern     Parent/sibling w/ CABG, MI or angioplasty before 65F 55M? No   Social History Narrative     Not on file      Family History   Problem Relation Age of Onset     Family History Negative Mother      Family History Negative Father          Exam:    Vitals:   Temp 97.9  F (36.6  C) (Oral)   Resp 16   Ht 1.702 m (5' 7\")   Wt 83.5 kg (184 lb)   LMP 05/29/2018 (LMP Unknown)   BMI 28.82 kg/m    FHT: Reactive, category 1    [place]:  No contractions noted    Alert Awake in NAD  HEENT grossly " normal  Neck: no lymphadenopathy or thryoidomegaly  Lungs CTAB  Back No CVAT  Heart RR  ABD gravid, NABS, soft, NT on exam with NT fundus palpable  Cervix not checked  EXT:  No edema, no calf tenderness      Assessment:    1)  IUP at 36w0d  2)  Class B IDDM - Hx poor control despite insulin pump and continuous BS monitoring  3)  GBS Pos    Plan:    1)  Observation overnight in L&D  2)  Per [facility], will monitor BS's both by Pt's continuous monitor as well as using conventional fingersticks by RN and compare results to confirm accuracy of Pt's monitor.  3)  Continue current insulin pump settings.  4)  Hospitalist consult to assist with insulin management.  5)  Pt not a candidate for BMZ due to her DM.  6)  If BS's persistently are > 200 despite any adjustments Hospitalist may make, consider moving to labor induction.  7)  PCN when in labor for GBS.      Trent Cid MD  February 5, 2019

## 2019-02-06 NOTE — PROVIDER NOTIFICATION
"   02/05/19 1927   Provider Notification   Provider Name/Title Dr Webster  (hospitalist)   Method of Notification At Bedside   Request Evaluate in Person   Notification Reason Other  (Diabetic plan of care review)   Hospitalist Dr. Webster at bedside to review plan of care with patient related to blood glucose management.  Plan to monitor glucose level before meals and 2 hrs after as well as one spot check overnight.  Patient reports changes made to her insulin dosing recently and last night her pump needle did come out resulting in receiving little/no insulin following eating a meal.  She did attempt to correct on her own with insulin bolus' but sugars were lower than her \"normal\" in the morning then quite elevated after eating which is not her normal.   Dr. Webster ordered consistent carb diet during her stay in hospital.  Patient verbalized understanding of plan of care.  Will check blood sugar utilizing unit glucometer and comparing with patient reading on her device. Continue to monitor per POC.   "

## 2019-02-06 NOTE — PROVIDER NOTIFICATION
02/06/19 1020   Provider Notification   Provider Name/Title Dr Quinn   Method of Notification At Bedside   Request Evaluate in Person

## 2019-02-06 NOTE — CONSULTS
[facility]  Consult Note - Hospitalist Service     Date of Admission:  2/5/2019  Consult Requested by: Trent Cid  Reason for Consult: Management of diabetes     Assessment & Plan   Guerline Rock is a 33 year old female admitted on 2/5/2019. She has a history of insulin dependent diabetes and is currently 36 weeks pregnant.  She has an insulin pump and Alexsandra CGM (continuous glucose monitor).  She mentions that her sugar is under excellent control at baseline although since her pregnancy she has been having increasing difficulties.  She was noted to have multiple blood sugars above 200 and was seen by MFM.  Direct admission to the hospital was arranged to better monitor her blood sugars and for diabetes control.    #Intrauterine pregnancy at 36 weeks.  Defer management to OB/GYN expertise.  Per discussion with OB, M is recommending delivery at 37-38 weeks given the risk of harm with poor diabetes control.  However, if her sugars persistently stay elevated above 200 then she might need to undergo induction and delivery sooner.    #Insulin-dependent diabetes mellitus.  Type I. Exacerbated by current pregnancy. She follows with Dr. [facility]jas at [facility].  According to the patient, her A1c is under excellent control at baseline around 6.  She mentions that the last A1c was about 2 weeks ago at 5.9.  Since her pregnancy, she has required increasing doses of insulin and adjustments in her insulin pump.  She had frequent blood sugar readings above 200 and some lows in the 70s.  Her insulin dose was increased just yesterday by her endocrinologist.  Her sugars are doing somewhat better now and currently 128.      -- Discussed with patient about option of stopping the pump and using Lantus and NovoLog, however she feels quite comfortable with using her pump and prefers to continue that.  - We will hold on making any changes in her insulin pump settings at this time since they were just  adjusted yesterday by her endocrinologist.  Monitor her blood sugars overnight, continue the baseline settings.  I have advised the patient to notify the nursing staff about exactly how much insulin she is taking that and do that in a supervised way.   -- If sugars stay elevated then we could add additional sliding scale insulin with NovoLog on top of her own scale.  Otherwise, could also touch base with her primary clinic tomorrow in case she needs any further adjustments.  -Check blood sugars with glucometer in the hospital and see if it correlates with her CGM.    Patient is quite comfortable with this plan and all of her questions were answered at length.    The patient's care was discussed with the Attending Physician, Dr. Cid, Bedside Nurse, Patient and Patient's Family.    Shelbie Webster MD  [facility]    ______________________________________________________________________    Chief Complaint   High blood sugars    History is obtained from the patient    History of Present Illness   Guerline Rock is a 33 year old female who who was admitted to the hospital today with concerns for high blood sugars.  Patient mentions that she has an insulin pump and also continuous glucose monitor.  She is currently about 36 weeks pregnant and recently has been having high blood sugars.  She had numerous readings above 200 in the last week.  She was seen by MFM and there was some concern about this increasing the risk of fetal harm.  She she mentioned that since her pregnancy her insulin requirements have gone up and her insulin dose has been increased.  She had contacted her clinic yesterday and her insulin pump was adjusted with some changes in her insulin dose.  She mentions that afterwards her pump cannula came out she had an episode of blood sugar above 200.  She gave herself some extra insulin bolus and the sugar dropped to 70.  She had a few of these fluctuations today where she gave herself more  insulin than usual with blood sugar dropping.  Below 80.  She denies any chest pain, shortness of breath.  No other complaints or concerns otherwise.  She prefers to just continue her insulin pump.  She has been told that if her sugars stay high then she will need to be induced sooner.    Review of Systems   The 10 point Review of Systems is negative other than noted in the HPI or here.     Past Medical History    I have reviewed this patient's medical history and updated it with pertinent information if needed.   Past Medical History:   Diagnosis Date     Diabetes mellitus type 1 (H) 2014       Past Surgical History   I have reviewed this patient's surgical history and updated it with pertinent information if needed.  Past Surgical History:   Procedure Laterality Date     DISCECTOMY LUMBAR MINIMALLY INVASIVE ONE LEVEL  ,     L4-L5     EYE SURGERY       FOOT SURGERY         Social History   I have reviewed this patient's social history and updated it with pertinent information if needed.  Social History     Tobacco Use     Smoking status: Never Smoker     Smokeless tobacco: Never Used   Substance Use Topics     Alcohol use: No     Drug use: No       Family History   I have reviewed this patient's family history and updated it with pertinent information if needed.   Family History   Problem Relation Age of Onset     Family History Negative Mother      Family History Negative Father    Reviewed and non-contributory to admission today     Medications   I have reviewed this patient's current medications  Medications Prior to Admission   Medication Sig Dispense Refill Last Dose     [] amoxicillin (AMOXIL) 500 MG capsule Take 1 capsule (500 mg) by mouth 2 times daily for 7 days 14 capsule 0      [] amoxicillin (AMOXIL) 500 MG capsule Take 1 capsule (500 mg) by mouth 3 times daily for 7 days 21 capsule 0      blood glucose monitoring (NO BRAND SPECIFIED) test strip Use to test blood sugars 6  times daily or as directed 100 strip 11 Taking     [] fluconazole (DIFLUCAN) 150 MG tablet Take 1 tablet (150 mg) by mouth once for 1 dose 1 tablet 0      guaiFENesin (MUCINEX) 600 MG 12 hr tablet Take 1 tablet (600 mg) by mouth 2 times daily as needed for congestion (Patient not taking: Reported on 2018) 20 tablet 0 Not Taking     insulin lispro (HUMALOG VIAL) SOLN 100 UNIT/ML units before breakfast,  units before lunch,  units before dinner one unit per 15 carbs 3 Month 3 Taking     miconazole (MONISTAT 1 DAY OR NIGHT) 1200 & 2 MG & % kit Place one dose vaginally 1 kit 1      ofloxacin (OCUFLOX) 0.3 % ophthalmic solution Apply 1 drop to eye every 4 hours (Patient not taking: Reported on 10/16/2018) 1 Bottle 3 Not Taking     Prenatal Vit-Fe Fumarate-FA (PRENATAL MULTIVITAMIN PLUS IRON) 27-0.8 MG TABS per tablet Take 1 tablet by mouth daily   Taking       Allergies   No Known Allergies    Physical Exam   Vital Signs: Temp: 97.9  F (36.6  C) Temp src: Oral       Resp: 16        Weight: 184 lbs 0 oz  GENERAL:  Awake and alert, No acute distress.  PSYCH: Pleasant, Cooperative, normal affect, no hallucinations   EYES: EOMI, conjunctiva clear  HEENT:  Head is normocephalic, atraumatic, Neck is Supple, trachea is midline   CARDIOVASCULAR: Regular rate and rhythm, Normal S1, S2, no loud pathological murmurs, no rubs or gallops.   PULMONARY:  Clear to auscultation bilaterally with good entry on both sides  CHEST: Good inspiratory effort bilaterally   GI: Abdomen is soft, gravid, non-tender   SKIN:  Dry, No obvious exanthems on exposed areas  EXTREMITIES:  Good capillary refill with signs of adequate peripheral perfusion.   Neuro: Awake and oriented x 3. No focal weakness or numbness is noted. Moving all extremities with good strength   MSK: Good range of motion in all major joints of upper and lower extremity    Data   Results for orders placed or performed during the hospital encounter of 19 (from the  past 24 hour(s))   Glucose by meter   Result Value Ref Range    Glucose 128 (H) 70 - 99 mg/dL

## 2019-02-06 NOTE — PLAN OF CARE
Data: [facility] presented to [facility]: 2019  6:22 PM.  Reason for maternal/fetal assessment is uncontrolled type I diabetes. Patient reports blood sugars very labile-from 60-200s.  Patient is a .  Prenatal record reviewed. Pregnancy  has been complicated by diabetes mellitus type I, not controlled.  Gestational Age 36w0d. VSS. Fetal movement present. Patient denies uterine contractions, leaking of vaginal fluid/rupture of membranes, vaginal bleeding, abdominal pain, pelvic pressure, nausea, vomiting, headache, visual disturbances, epigastric or URQ pain, significant edema. Support person is present.   Action: Verbal consent for EFM. Triage assessment completed. Bill of rights reviewed. Dr Cid at bedside to discuss plan of care with patient and spouse, Wilber  Response: Plan to have continuous monitoring and check blood sugars before meals and two hours post meals, patient verbalized agreement with plan. Hospitalist will come to evaluate patient and develop further plan.

## 2019-02-06 NOTE — PROGRESS NOTES
"S:  Glucoses better since admission, 128-132, until 1 hour post breakfast at 180 on her monitor and 160 on the hospital glucometer   O:/54   Pulse 77   Temp 97.9  F (36.6  C) (Oral)   Resp 16   Ht 1.702 m (5' 7\")   Wt 83.5 kg (184 lb)   LMP 2018 (LMP Unknown)   Breastfeeding? No   BMI 28.82 kg/m     : 2 cm from office visit     FHT's Category 1   Contractions rare   GBS positive    A:  33 year old y/o  @ 36w1d  wks EGA with uncontrolled Type 1 DM and GBS pos  Better response to endocrinology changes, with higher glucose after am meal.   P: Will await response to post meal.  Anticipate discharge home with IOL @ 37 weeks,   But will have her update me with her glucoses Thursday and Friday if she is able to   Be discharged home  If controlled outpatient over the next few days will plan for IOL 37 weeks   Continue biweekly bpp   Appreciate hospitalist input      Dr. Meghan Quinn, DO    OB/GYN   [facility]    " 1.87

## 2019-02-06 NOTE — PROVIDER NOTIFICATION
02/06/19 1416   Provider Notification   Provider Name/Title Dr Quinn   Method of Notification Electronic Page   pt has not been giving herself corrections with her insulin pump, also states she feels like she would be doing better if she was moving around.  Dr Quinn order to walk around, and have pt make corrections after supper. Will update MD with results at that time.

## 2019-02-07 ENCOUNTER — TELEPHONE (OUTPATIENT)
Dept: OBGYN | Facility: CLINIC | Age: 34
End: 2019-02-07

## 2019-02-07 DIAGNOSIS — O99.820 GROUP B STREPTOCOCCUS CARRIER, +RV CULTURE, CURRENTLY PREGNANT: ICD-10-CM

## 2019-02-07 DIAGNOSIS — O09.93 HIGH-RISK PREGNANCY IN THIRD TRIMESTER: ICD-10-CM

## 2019-02-07 NOTE — PLAN OF CARE
Data: Patient assessed in the [facility] for diabetic glucose monitoring and fetal monitoring.  Cervical exam not examined.  Membranes intact.  Contractions irregular, however patient denies feeling them.  Action:  Presumed adequate fetal oxygenation documented (see flow record). Discharge instructions reviewed.  Patient instructed to report change in fetal movement, vaginal leaking of fluid or bleeding, abdominal pain, or any concerns related to the pregnancy to her nurse/physician.    Response: Orders to discharge home per Dr. Quinn.  Patient verbalized understanding of education and verbalized agreement with plan. Discharged to home at 1940, ambulating without difficulty.

## 2019-02-07 NOTE — DISCHARGE INSTRUCTIONS
Discharge Instruction for Undelivered Patients      You were seen for: diabetic monitoring and fetal monitoring  We Consulted:Dr. Quinn  You had (Test or Medicine):Fetal monitoring, contraction monitoring, glucose monitoring and labs     Diet:   Follow guidelines as indicated by your diabetic educator     Activity:  Count fetal kicks everyday (see handout)  Call your doctor or nurse midwife if your baby is moving less than usual.     Call your provider if you notice:  Swelling in your face or increased swelling in your hands or legs.  Headaches that are not relieved by Tylenol (acetaminophen).  Changes in your vision (blurring: seeing spots or stars.)  Nausea (sick to your stomach) and vomiting (throwing up).   Weight gain of 5 pounds or more per week.  Heartburn that doesn't go away.  Signs of bladder infection: pain when you urinate (use the toilet), need to go more often and more urgently.  The bag of thrasher (rupture of membranes) breaks, or you notice leaking in your underwear.  Bright red blood in your underwear.  Abdominal (lower belly) or stomach pain.  Second (plus) baby: Contractions (tightening) less than 10 minutes apart and getting stronger.  Increase or change in vaginal discharge (note the color and amount)    Notify Dr. Quinn by 2pm tomorrow 2/7/2019 with blood sugar results.    Follow-up:  As scheduled in the clinic

## 2019-02-07 NOTE — TELEPHONE ENCOUNTER
Dr. Quinn consulted with [facility] and patient is ok to induce 2/8/19 due to Type 1 Diabetes.    Pt scheduled for medical induction at [facility] on 2/8/19. Induction form faxed to L/D. Induction placed on [place] calendar.    Of note, I faxed pt's Post Delivery Insulin Plan to L/D this afternoon. There is a copy of plan at my desk in the [facility].    Dr. Quinn called the patient and advised her of induction and gave her instructions.    Esthela Martinez CMA

## 2019-02-07 NOTE — DISCHARGE SUMMARY
33 year old y/o  @ 36w2d with Estimated Date of Delivery: Mar 5, 2019   Admitted overnight for observation due to several elevated glucoses > 200 and the remaining low at 60-80  Was stable overnight, bit higher 180s when not correcting per endocrinology   With dinner glucose, normalized with correction per endo.   Will update me with glucoses via page over next few days, if elevated again, plan to induce.   Otherwise, plan to IOL 37 weeks EGA     Dr. Meghan Quinn, DO    Obstetrics and Gynecology  [facility] - [facility]

## 2019-02-07 NOTE — PROVIDER NOTIFICATION
02/06/19 1908   Provider Notification   Provider Name/Title Dr Quinn   Method of Notification Electronic Page;Phone   Request Evaluate - Remote   Notification Reason Lab/Diagnostic Study   updated re: BS results. Orders received for pt to discharge home and text page tomorrow with BS results by 1400. Pt understanding and MD to schedule induction for 37 weeks.

## 2019-02-08 ENCOUNTER — HOSPITAL ENCOUNTER (INPATIENT)
Facility: CLINIC | Age: 34
LOS: 2 days | Discharge: HOME | End: 2019-02-10
Attending: FAMILY MEDICINE | Admitting: FAMILY MEDICINE
Payer: COMMERCIAL

## 2019-02-08 ENCOUNTER — ANESTHESIA EVENT (OUTPATIENT)
Dept: OBGYN | Facility: CLINIC | Age: 34
End: 2019-02-08
Payer: COMMERCIAL

## 2019-02-08 ENCOUNTER — ANESTHESIA (OUTPATIENT)
Dept: OBGYN | Facility: CLINIC | Age: 34
End: 2019-02-08
Payer: COMMERCIAL

## 2019-02-08 DIAGNOSIS — E10.9 TYPE 1 DIABETES MELLITUS WITHOUT COMPLICATION (H): ICD-10-CM

## 2019-02-08 DIAGNOSIS — E10.9 DM TYPE 1, GOAL A1C BELOW 7.5% (H): Primary | ICD-10-CM

## 2019-02-08 PROBLEM — E11.9 DIABETES (H): Status: ACTIVE | Noted: 2019-02-08

## 2019-02-08 LAB
ABO + RH BLD: NORMAL
ABO + RH BLD: NORMAL
BLD GP AB SCN SERPL QL: NORMAL
BLOOD BANK CMNT PATIENT-IMP: NORMAL
GLUCOSE BLDC GLUCOMTR-MCNC: 101 MG/DL (ref 70–99)
GLUCOSE BLDC GLUCOMTR-MCNC: 113 MG/DL (ref 70–99)
GLUCOSE BLDC GLUCOMTR-MCNC: 118 MG/DL (ref 70–99)
GLUCOSE BLDC GLUCOMTR-MCNC: 127 MG/DL (ref 70–99)
GLUCOSE BLDC GLUCOMTR-MCNC: 131 MG/DL (ref 70–99)
GLUCOSE BLDC GLUCOMTR-MCNC: 166 MG/DL (ref 70–99)
GLUCOSE BLDC GLUCOMTR-MCNC: 186 MG/DL (ref 70–99)
GLUCOSE BLDC GLUCOMTR-MCNC: 200 MG/DL (ref 70–99)
GLUCOSE BLDC GLUCOMTR-MCNC: 201 MG/DL (ref 70–99)
GLUCOSE BLDC GLUCOMTR-MCNC: 250 MG/DL (ref 70–99)
GLUCOSE BLDC GLUCOMTR-MCNC: 85 MG/DL (ref 70–99)
GLUCOSE BLDC GLUCOMTR-MCNC: 97 MG/DL (ref 70–99)
HGB BLD-MCNC: 11.3 G/DL (ref 11.7–15.7)
SPECIMEN EXP DATE BLD: NORMAL

## 2019-02-08 PROCEDURE — 88341 IMHCHEM/IMCYTCHM EA ADD ANTB: CPT | Mod: 26,76 | Performed by: FAMILY MEDICINE

## 2019-02-08 PROCEDURE — 88341 IMHCHEM/IMCYTCHM EA ADD ANTB: CPT | Performed by: FAMILY MEDICINE

## 2019-02-08 PROCEDURE — 88342 IMHCHEM/IMCYTCHM 1ST ANTB: CPT | Mod: 26 | Performed by: FAMILY MEDICINE

## 2019-02-08 PROCEDURE — 00000146 ZZHCL STATISTIC GLUCOSE BY METER IP

## 2019-02-08 PROCEDURE — 25000125 ZZHC RX 250: Performed by: FAMILY MEDICINE

## 2019-02-08 PROCEDURE — 27110038 ZZH RX 271: Performed by: ANESTHESIOLOGY

## 2019-02-08 PROCEDURE — 25000131 ZZH RX MED GY IP 250 OP 636 PS 637: Performed by: FAMILY MEDICINE

## 2019-02-08 PROCEDURE — 88307 TISSUE EXAM BY PATHOLOGIST: CPT | Performed by: FAMILY MEDICINE

## 2019-02-08 PROCEDURE — 88342 IMHCHEM/IMCYTCHM 1ST ANTB: CPT | Performed by: FAMILY MEDICINE

## 2019-02-08 PROCEDURE — 72200001 ZZH LABOR CARE VAGINAL DELIVERY SINGLE

## 2019-02-08 PROCEDURE — 40000671 ZZH STATISTIC ANESTHESIA CASE

## 2019-02-08 PROCEDURE — 88307 TISSUE EXAM BY PATHOLOGIST: CPT | Mod: 26 | Performed by: FAMILY MEDICINE

## 2019-02-08 PROCEDURE — 25000128 H RX IP 250 OP 636: Performed by: FAMILY MEDICINE

## 2019-02-08 PROCEDURE — 85018 HEMOGLOBIN: CPT | Performed by: FAMILY MEDICINE

## 2019-02-08 PROCEDURE — 3E0R3BZ INTRODUCTION OF ANESTHETIC AGENT INTO SPINAL CANAL, PERCUTANEOUS APPROACH: ICD-10-PCS | Performed by: FAMILY MEDICINE

## 2019-02-08 PROCEDURE — 37000011 ZZH ANESTHESIA WARD SERVICE

## 2019-02-08 PROCEDURE — 86900 BLOOD TYPING SEROLOGIC ABO: CPT | Performed by: FAMILY MEDICINE

## 2019-02-08 PROCEDURE — 10907ZC DRAINAGE OF AMNIOTIC FLUID, THERAPEUTIC FROM PRODUCTS OF CONCEPTION, VIA NATURAL OR ARTIFICIAL OPENING: ICD-10-PCS | Performed by: FAMILY MEDICINE

## 2019-02-08 PROCEDURE — 86850 RBC ANTIBODY SCREEN: CPT | Performed by: FAMILY MEDICINE

## 2019-02-08 PROCEDURE — 59410 OBSTETRICAL CARE: CPT | Performed by: FAMILY MEDICINE

## 2019-02-08 PROCEDURE — 00HU33Z INSERTION OF INFUSION DEVICE INTO SPINAL CANAL, PERCUTANEOUS APPROACH: ICD-10-PCS | Performed by: FAMILY MEDICINE

## 2019-02-08 PROCEDURE — 99207 ZZC CONSULT E&M CHANGED TO INITIAL LEVEL: CPT | Performed by: INTERNAL MEDICINE

## 2019-02-08 PROCEDURE — 99233 SBSQ HOSP IP/OBS HIGH 50: CPT | Performed by: INTERNAL MEDICINE

## 2019-02-08 PROCEDURE — 3E033VJ INTRODUCTION OF OTHER HORMONE INTO PERIPHERAL VEIN, PERCUTANEOUS APPROACH: ICD-10-PCS | Performed by: FAMILY MEDICINE

## 2019-02-08 PROCEDURE — 86901 BLOOD TYPING SEROLOGIC RH(D): CPT | Performed by: FAMILY MEDICINE

## 2019-02-08 PROCEDURE — 12000000 ZZH R&B MED SURG/OB

## 2019-02-08 PROCEDURE — 0064U ANTB TP TOTAL&RPR IA QUAL: CPT | Performed by: FAMILY MEDICINE

## 2019-02-08 PROCEDURE — 25000132 ZZH RX MED GY IP 250 OP 250 PS 637: Performed by: FAMILY MEDICINE

## 2019-02-08 PROCEDURE — 25000128 H RX IP 250 OP 636: Performed by: ANESTHESIOLOGY

## 2019-02-08 RX ORDER — LIDOCAINE 40 MG/G
CREAM TOPICAL
Status: DISCONTINUED | OUTPATIENT
Start: 2019-02-08 | End: 2019-02-08

## 2019-02-08 RX ORDER — NALOXONE HYDROCHLORIDE 0.4 MG/ML
.1-.4 INJECTION, SOLUTION INTRAMUSCULAR; INTRAVENOUS; SUBCUTANEOUS
Status: DISCONTINUED | OUTPATIENT
Start: 2019-02-08 | End: 2019-02-10 | Stop reason: HOSPADM

## 2019-02-08 RX ORDER — OXYTOCIN/0.9 % SODIUM CHLORIDE 30/500 ML
340 PLASTIC BAG, INJECTION (ML) INTRAVENOUS CONTINUOUS PRN
Status: DISCONTINUED | OUTPATIENT
Start: 2019-02-08 | End: 2019-02-10 | Stop reason: HOSPADM

## 2019-02-08 RX ORDER — ACETAMINOPHEN 325 MG/1
650 TABLET ORAL EVERY 4 HOURS PRN
Status: DISCONTINUED | OUTPATIENT
Start: 2019-02-08 | End: 2019-02-10 | Stop reason: HOSPADM

## 2019-02-08 RX ORDER — IBUPROFEN 800 MG/1
800 TABLET, FILM COATED ORAL
Status: DISCONTINUED | OUTPATIENT
Start: 2019-02-08 | End: 2019-02-08

## 2019-02-08 RX ORDER — OXYTOCIN/0.9 % SODIUM CHLORIDE 30/500 ML
1-24 PLASTIC BAG, INJECTION (ML) INTRAVENOUS CONTINUOUS
Status: DISCONTINUED | OUTPATIENT
Start: 2019-02-08 | End: 2019-02-08

## 2019-02-08 RX ORDER — ONDANSETRON 2 MG/ML
4 INJECTION INTRAMUSCULAR; INTRAVENOUS EVERY 6 HOURS PRN
Status: DISCONTINUED | OUTPATIENT
Start: 2019-02-08 | End: 2019-02-08

## 2019-02-08 RX ORDER — PENICILLIN G POTASSIUM 5000000 [IU]/1
5 INJECTION, POWDER, FOR SOLUTION INTRAMUSCULAR; INTRAVENOUS ONCE
Status: COMPLETED | OUTPATIENT
Start: 2019-02-08 | End: 2019-02-08

## 2019-02-08 RX ORDER — AMOXICILLIN 250 MG
1 CAPSULE ORAL 2 TIMES DAILY
Status: DISCONTINUED | OUTPATIENT
Start: 2019-02-08 | End: 2019-02-10 | Stop reason: HOSPADM

## 2019-02-08 RX ORDER — HYDROCORTISONE 2.5 %
CREAM (GRAM) TOPICAL 3 TIMES DAILY PRN
Status: DISCONTINUED | OUTPATIENT
Start: 2019-02-08 | End: 2019-02-10 | Stop reason: HOSPADM

## 2019-02-08 RX ORDER — NALOXONE HYDROCHLORIDE 0.4 MG/ML
.1-.4 INJECTION, SOLUTION INTRAMUSCULAR; INTRAVENOUS; SUBCUTANEOUS
Status: DISCONTINUED | OUTPATIENT
Start: 2019-02-08 | End: 2019-02-08

## 2019-02-08 RX ORDER — DEXTROSE MONOHYDRATE 25 G/50ML
25-50 INJECTION, SOLUTION INTRAVENOUS
Status: DISCONTINUED | OUTPATIENT
Start: 2019-02-08 | End: 2019-02-10 | Stop reason: HOSPADM

## 2019-02-08 RX ORDER — BISACODYL 10 MG
10 SUPPOSITORY, RECTAL RECTAL DAILY PRN
Status: DISCONTINUED | OUTPATIENT
Start: 2019-02-10 | End: 2019-02-10 | Stop reason: HOSPADM

## 2019-02-08 RX ORDER — OXYTOCIN/0.9 % SODIUM CHLORIDE 30/500 ML
100 PLASTIC BAG, INJECTION (ML) INTRAVENOUS CONTINUOUS
Status: DISCONTINUED | OUTPATIENT
Start: 2019-02-08 | End: 2019-02-10 | Stop reason: HOSPADM

## 2019-02-08 RX ORDER — AMOXICILLIN 250 MG
2 CAPSULE ORAL 2 TIMES DAILY
Status: DISCONTINUED | OUTPATIENT
Start: 2019-02-08 | End: 2019-02-10 | Stop reason: HOSPADM

## 2019-02-08 RX ORDER — NALBUPHINE HYDROCHLORIDE 10 MG/ML
2.5-5 INJECTION, SOLUTION INTRAMUSCULAR; INTRAVENOUS; SUBCUTANEOUS EVERY 6 HOURS PRN
Status: DISCONTINUED | OUTPATIENT
Start: 2019-02-08 | End: 2019-02-08

## 2019-02-08 RX ORDER — OXYTOCIN/0.9 % SODIUM CHLORIDE 30/500 ML
100-340 PLASTIC BAG, INJECTION (ML) INTRAVENOUS CONTINUOUS PRN
Status: DISCONTINUED | OUTPATIENT
Start: 2019-02-08 | End: 2019-02-08

## 2019-02-08 RX ORDER — BUPIVACAINE HCL/0.9 % NACL/PF 0.125 %
PLASTIC BAG, INJECTION (ML) EPIDURAL CONTINUOUS
Status: DISCONTINUED | OUTPATIENT
Start: 2019-02-08 | End: 2019-02-08

## 2019-02-08 RX ORDER — DEXTROSE MONOHYDRATE 25 G/50ML
25-50 INJECTION, SOLUTION INTRAVENOUS
Status: DISCONTINUED | OUTPATIENT
Start: 2019-02-08 | End: 2019-02-08

## 2019-02-08 RX ORDER — MISOPROSTOL 200 UG/1
800 TABLET ORAL
Status: DISCONTINUED | OUTPATIENT
Start: 2019-02-08 | End: 2019-02-10 | Stop reason: HOSPADM

## 2019-02-08 RX ORDER — DEXTROSE, SODIUM CHLORIDE, SODIUM LACTATE, POTASSIUM CHLORIDE, AND CALCIUM CHLORIDE 5; .6; .31; .03; .02 G/100ML; G/100ML; G/100ML; G/100ML; G/100ML
INJECTION, SOLUTION INTRAVENOUS CONTINUOUS
Status: DISCONTINUED | OUTPATIENT
Start: 2019-02-08 | End: 2019-02-08

## 2019-02-08 RX ORDER — IBUPROFEN 800 MG/1
800 TABLET, FILM COATED ORAL EVERY 6 HOURS PRN
Status: DISCONTINUED | OUTPATIENT
Start: 2019-02-08 | End: 2019-02-10 | Stop reason: HOSPADM

## 2019-02-08 RX ORDER — OXYTOCIN 10 [USP'U]/ML
10 INJECTION, SOLUTION INTRAMUSCULAR; INTRAVENOUS
Status: DISCONTINUED | OUTPATIENT
Start: 2019-02-08 | End: 2019-02-10 | Stop reason: HOSPADM

## 2019-02-08 RX ORDER — NICOTINE POLACRILEX 4 MG
15-30 LOZENGE BUCCAL
Status: DISCONTINUED | OUTPATIENT
Start: 2019-02-08 | End: 2019-02-10 | Stop reason: HOSPADM

## 2019-02-08 RX ORDER — SODIUM CHLORIDE, SODIUM LACTATE, POTASSIUM CHLORIDE, CALCIUM CHLORIDE 600; 310; 30; 20 MG/100ML; MG/100ML; MG/100ML; MG/100ML
INJECTION, SOLUTION INTRAVENOUS CONTINUOUS
Status: DISCONTINUED | OUTPATIENT
Start: 2019-02-08 | End: 2019-02-08

## 2019-02-08 RX ORDER — METHYLERGONOVINE MALEATE 0.2 MG/ML
200 INJECTION INTRAVENOUS
Status: DISCONTINUED | OUTPATIENT
Start: 2019-02-08 | End: 2019-02-08

## 2019-02-08 RX ORDER — OXYCODONE AND ACETAMINOPHEN 5; 325 MG/1; MG/1
1 TABLET ORAL
Status: DISCONTINUED | OUTPATIENT
Start: 2019-02-08 | End: 2019-02-08

## 2019-02-08 RX ORDER — ONDANSETRON 4 MG/1
4 TABLET, ORALLY DISINTEGRATING ORAL EVERY 6 HOURS PRN
Status: DISCONTINUED | OUTPATIENT
Start: 2019-02-08 | End: 2019-02-08

## 2019-02-08 RX ORDER — OXYCODONE HYDROCHLORIDE 5 MG/1
5 TABLET ORAL EVERY 4 HOURS PRN
Status: DISCONTINUED | OUTPATIENT
Start: 2019-02-08 | End: 2019-02-10 | Stop reason: HOSPADM

## 2019-02-08 RX ORDER — ACETAMINOPHEN 325 MG/1
650 TABLET ORAL EVERY 4 HOURS PRN
Status: DISCONTINUED | OUTPATIENT
Start: 2019-02-08 | End: 2019-02-08

## 2019-02-08 RX ORDER — OXYTOCIN 10 [USP'U]/ML
10 INJECTION, SOLUTION INTRAMUSCULAR; INTRAVENOUS
Status: DISCONTINUED | OUTPATIENT
Start: 2019-02-08 | End: 2019-02-08

## 2019-02-08 RX ORDER — LANOLIN 100 %
OINTMENT (GRAM) TOPICAL
Status: DISCONTINUED | OUTPATIENT
Start: 2019-02-08 | End: 2019-02-10 | Stop reason: HOSPADM

## 2019-02-08 RX ORDER — SODIUM CHLORIDE 9 MG/ML
INJECTION, SOLUTION INTRAVENOUS CONTINUOUS
Status: DISCONTINUED | OUTPATIENT
Start: 2019-02-08 | End: 2019-02-08

## 2019-02-08 RX ORDER — NICOTINE POLACRILEX 4 MG
15-30 LOZENGE BUCCAL
Status: DISCONTINUED | OUTPATIENT
Start: 2019-02-08 | End: 2019-02-08

## 2019-02-08 RX ORDER — CARBOPROST TROMETHAMINE 250 UG/ML
250 INJECTION, SOLUTION INTRAMUSCULAR
Status: DISCONTINUED | OUTPATIENT
Start: 2019-02-08 | End: 2019-02-08

## 2019-02-08 RX ORDER — EPHEDRINE SULFATE 50 MG/ML
5 INJECTION, SOLUTION INTRAMUSCULAR; INTRAVENOUS; SUBCUTANEOUS
Status: DISCONTINUED | OUTPATIENT
Start: 2019-02-08 | End: 2019-02-08

## 2019-02-08 RX ADMIN — OXYTOCIN-SODIUM CHLORIDE 0.9% IV SOLN 30 UNIT/500ML 2 MILLI-UNITS/MIN: 30-0.9/5 SOLUTION at 09:34

## 2019-02-08 RX ADMIN — SODIUM CHLORIDE: 9 INJECTION, SOLUTION INTRAVENOUS at 10:05

## 2019-02-08 RX ADMIN — INSULIN GLARGINE 15 UNITS: 100 INJECTION, SOLUTION SUBCUTANEOUS at 23:09

## 2019-02-08 RX ADMIN — IBUPROFEN 800 MG: 800 TABLET ORAL at 20:29

## 2019-02-08 RX ADMIN — SODIUM CHLORIDE, POTASSIUM CHLORIDE, SODIUM LACTATE AND CALCIUM CHLORIDE: 600; 310; 30; 20 INJECTION, SOLUTION INTRAVENOUS at 09:17

## 2019-02-08 RX ADMIN — SODIUM CHLORIDE 2.5 MILLION UNITS: 9 INJECTION, SOLUTION INTRAVENOUS at 13:43

## 2019-02-08 RX ADMIN — PENICILLIN G POTASSIUM 5 MILLION UNITS: 5000000 POWDER, FOR SOLUTION INTRAMUSCULAR; INTRAPLEURAL; INTRATHECAL; INTRAVENOUS at 09:37

## 2019-02-08 RX ADMIN — SODIUM CHLORIDE, POTASSIUM CHLORIDE, SODIUM LACTATE AND CALCIUM CHLORIDE: 600; 310; 30; 20 INJECTION, SOLUTION INTRAVENOUS at 14:36

## 2019-02-08 RX ADMIN — INSULIN ASPART 5 UNITS: 100 INJECTION, SOLUTION INTRAVENOUS; SUBCUTANEOUS at 21:52

## 2019-02-08 RX ADMIN — Medication: at 14:41

## 2019-02-08 RX ADMIN — SODIUM CHLORIDE: 9 INJECTION, SOLUTION INTRAVENOUS at 10:04

## 2019-02-08 RX ADMIN — SODIUM CHLORIDE 2.5 MILLION UNITS: 9 INJECTION, SOLUTION INTRAVENOUS at 17:58

## 2019-02-08 NOTE — PLAN OF CARE
Data: [facility]: 2019  8:20 AM.  Patient admitted for induction for gestational diabetes (Type I DM), uncontrolled. Patient is a .  Prenatal record reviewed. Pregnancy  has been complicated by gestational diabetes, insulin controlled.  Gestational Age 36w3d. VSS. Fetal movement present. Patient denies uterine contractions, leaking of vaginal fluid/rupture of membranes, vaginal bleeding, abdominal pain, pelvic pressure, nausea, vomiting, headache, visual disturbances, epigastric or URQ pain, significant edema. Support person is present.   Action: Verbal consent for EFM.  Admission assessment completed. Bill of rights reviewed.  Response: Patient verbalized agreement with plan. Will contact Dr Meghan Quinn with update and further orders.

## 2019-02-08 NOTE — ANESTHESIA PROCEDURE NOTES
Peripheral nerve/Neuraxial procedure note :  Pre-Procedure  Performed by   Referred by TAWNY  Location:       .       Assessment/Narrative  .  .  . Comments:  Pre-Procedure  Performed by Javed Cash MD  Location: OB.      PreAnesthestic Checklist: patient identified, IV checked, risks and benefits discussed, informed consent obtained, monitors and equipment checked, pre-op evaluation and at physician/surgeon's request.    Timeout   Correct Patient: Yes  Correct Procedure: Epidural catheter placement  Correct Site: Yes   Correct Position: Yes    Procedure Documentation  Procedure:   Epidural catheter block for Labor    Patient currently in labor and she and OBMD request a labor epidural to control her labor pains. Patient was interviewed and examined. Procedure and risks including but not limited to bleeding, infection, nerve injury, paralysis, PDPH, and inadequate block requiring intervention discussed with patient. Questions answered. This epidural is to be placed in anticipation of vaginal delivery.  She consents to the epidural procedure.  Time-out was performed.  I or my partners remain immediately available for management of any issues or complications and will monitor at appropriate intervals.  Procedure: Patient sitting. Betadine prep x 3. Sterile drape applied.  Mask and gloves used.  Lidocaine 1%  local infiltration at L 3-4.  17 G. Tuohy needle at L3-4 by loss of resistance into epidural space.  No CSF, paresthesia or blood. 1.5 % Lidocaine with 1:200,000 Epinephrine 5cc test dose. Epidural catheter inserted w/o resistance to 5 cm in epidural space. Then 0.125% bupivicaine 15 cc with NS 5 cc.  Aspiration negative for blood and CSF.   Negative for neuro change, paresthesia or symptoms of intravascular injection or intrathecal injection.  Infusion orders written and infusion of 0.125% bupivicaine 15cc per hour started.    Javed Cash MD

## 2019-02-08 NOTE — PROVIDER NOTIFICATION
02/08/19 1700   Provider Notification   Provider Name/Title Dr. Quinn   Method of Notification At Bedside   Dr. Quinn at bedside. SVE by MD 10/100/1. Plan to set up for pushing.

## 2019-02-08 NOTE — CONSULTS
[facility]  Hospitalist Consult Note  Name: Guerline Rokc    MRN: [id number]  YOB: 1985    Age: 33 year old  Date of admission: 2/8/2019  Primary care provider: Meghan Quinn     Requesting Physician: Dr. Meghan Quinn  Reason for consult: Insulin management    Guerline Rock is a 33 year old female with past medical history notable for type 1 diabetes currently 36 weeks and 3 days pregnant admitted for induction of labor in the setting of uncontrolled type 1 diabetes and group B strep positivity.  She currently is being managed appropriately with an insulin drip and pending delivery will need to transition off the drip.  In the past she has had dramatically lower insulin requirements when not pregnant so I would be hesitant to restart her pump.  Following delivery I anticipate that a basal/bolus strategy with subcu insulin might be superior in the short-term at least pending follow-up with her endocrinologist.         Assessment and Plan:   1.  Uncontrolled type 1 diabetes: Control has generally been good per her report when not pregnant.  Multiple recent issues including problems with her pump and insulin pens at home have led to uncontrolled sugars.  Currently much better controlled with blood sugars back down in the 100s after being as high as 350 last night prior to presentation.  --Agree with insulin drip for now during the induction/labor process.  --Given past history of a dramatically lower insulin requirement after delivery I would be hesitant to restart her insulin pump as she might be at risk of lows.  --Anticipate transitioning to a basal/bolus insulin regimen after delivery (?tomorrow).  When not pregnant she notes in the past she has taken 15 units of Lantus daily and 1 unit insulin per 15 g of carbs.  Currently she thinks her carb ratio is 1 unit per 6 g.   --She follows with Dr. Rojas of [facility]    2.  36 weeks 3 days pregnant, admitted for  induction of labor: Management deferred to primary OB service.    3.  Group B strep positivity: Defer antibiotic treatment to primary OB service    4.  Anemia: Would monitor in the peripartum period.         History of Present Illness:   Guerline Rock is a 33 year old female with past medical history notable for type 1 diabetes currently 36 weeks and 3 days pregnant admitted for induction of labor in the setting of uncontrolled type 1 diabetes and group B strep positivity.  She apparently has had fairly poor control of her diabetes of late and in spite of using an insulin pump has had blood sugars in the 200s this week.  She saw her endocrinologist and had her pump adjusted and then actually was borderline low with some readings in the 60s-80s.  Upon admission here she notes that her blood sugars were actually up to 300s again at home as she thinks her pump stopped working.  Specifically, she notes that the pump seemed to be leaking for some reason rather than injecting insulin into her arm.  She called her endocrinologist and was instructed to take subcu insulin but oddly her pen seem to not be working either and not injecting properly.  She notes that this has never really been an issue for her before and she was diagnosed with type 1 diabetes about 6 years ago and generally her control has been excellent when not pregnant.  She notes that after prior pregnancies her insulin requirement has gone down fairly dramatically and has never been on an insulin pump while pregnant prior to this pregnancy.    Otherwise she denies medical problems or other issues.              Past Medical History:     Past Medical History:   Diagnosis Date     Diabetes mellitus type 1 (H) 1/16/2014             Past Surgical History:     Past Surgical History:   Procedure Laterality Date     DISCECTOMY LUMBAR MINIMALLY INVASIVE ONE LEVEL  2010, 2001    L4-L5     EYE SURGERY       FOOT SURGERY                 Social History:     Social History      Tobacco Use     Smoking status: Never Smoker     Smokeless tobacco: Never Used   Substance Use Topics     Alcohol use: No             Family History:     Family History   Problem Relation Age of Onset     Family History Negative Mother      Family History Negative Father              Allergies:   No Known Allergies          Medications:       penicillin G potassium  5 Million Units Intravenous Once    Followed by     penicillin G potassium  2.5 Million Units Intravenous Q4H     sodium chloride (PF)  3 mL Intracatheter Q8H     sodium chloride (PF)  3 mL Intracatheter Q8H             Review of Systems:   A comprehensive greater than 10 system review of systems was carried out.  Pertinent positives and negatives are noted above.  Otherwise negative for contributory info.            Physical Exam:   Blood pressure 114/57, temperature 98.9  F (37.2  C), temperature source Oral, resp. rate 18, last menstrual period 05/29/2018, not currently breastfeeding.  Exam:  General: Alert, awake, no acute distress.  Nontoxic woman, pleasant and conversant.  HEENT: NC/AT, eyes anicteric, external occular movements intact, face symmetric.  Dentition WNL, MM moist.  Cardiac: RRR, S1, S2.  No murmurs appreciated.  Pulmonary: Normal chest rise, normal work of breathing.  Lungs CTA BL  Abdomen: Gravid  Extremities: no deformities.  Warm, well perfused.  Skin: no rashes or lesions noted.  Warm and Dry.  Neuro: No focal deficits noted.  Speech clear.  Coordination and strength grossly normal.  Psych: Appropriate affect.           Data:   Imaging:  none    Recent Labs   Lab 02/06/19  0624   WBC 7.5   HGB 11.3*   HCT 33.6*   MCV 89             Lab Results   Component Value Date     02/06/2019     07/28/2015     09/25/2014    Lab Results   Component Value Date    CHLORIDE 110 02/06/2019    CHLORIDE 106 07/28/2015    CHLORIDE 107 09/25/2014    Lab Results   Component Value Date    BUN 13 02/06/2019    BUN 12  10/23/2018    BUN 16 07/28/2015      Lab Results   Component Value Date    POTASSIUM 3.8 02/06/2019    POTASSIUM 4.2 05/14/2018    POTASSIUM 4.0 05/23/2017    Lab Results   Component Value Date    CO2 22 02/06/2019    CO2 23 07/28/2015    CO2 28 09/25/2014    Lab Results   Component Value Date    CR 0.54 02/06/2019    CR 0.57 10/23/2018    CR 0.52 10/12/2018        Recent Labs   Lab 02/08/19  0835 02/06/19  1907 02/06/19  1701 02/06/19  1355 02/06/19  1156  02/06/19  0624   GLC  --   --   --   --   --   --  117*   * 130* 141* 164* 150*   < >  --     < > = values in this interval not displayed.

## 2019-02-08 NOTE — PROVIDER NOTIFICATION
02/08/19 1425   Provider Notification   Provider Name/Title Dr. Cash   Method of Notification At Bedside   Dr. Cash at bedside for epidural placement per pt request for pain management in labor.

## 2019-02-08 NOTE — ANESTHESIA PREPROCEDURE EVALUATION
Anesthesia Pre-Procedure Evaluation    Patient: Guerline Rock   MRN: [id number] : 1985          Preoperative Diagnosis: * No surgery found *        Past Medical History:   Diagnosis Date     Diabetes mellitus type 1 (H) 2014     Past Surgical History:   Procedure Laterality Date     DISCECTOMY LUMBAR MINIMALLY INVASIVE ONE LEVEL  ,     L4-L5     EYE SURGERY       FOOT SURGERY       Anesthesia Evaluation       history and physical reviewed .      No history of anesthetic complications          ROS/MED HX    ENT/Pulmonary:  - neg pulmonary ROS     Neurologic:       Cardiovascular:  - neg cardiovascular ROS       METS/Exercise Tolerance:     Hematologic:         Musculoskeletal:         GI/Hepatic:     (+) GERD       Renal/Genitourinary:         Endo:     (+) type I DM, .      Psychiatric:         Infectious Disease:         Malignancy:         Other:                       (+) gestational diabetes          Physical Exam  Normal systems: cardiovascular and pulmonary    Airway   Mallampati: II  TM distance: > 3 FB  Neck ROM: full  Mouth opening: > 3 cm    Dental     Cardiovascular       Pulmonary     Other findings: Lab Test        02/08/19     02/06/19     10/23/18     08/22/18                       0915          0624          1138          1130          WBC           --          7.5          7.6          7.9           HGB          11.3*        11.3*        11.7         13.1          MCV           --          89           92           92            PLT           --          219          230          226            Lab Test        02/06/19     10/23/18     10/12/18     05/14/18     05/23/17      --          07/28/15      --          14                       0624          1138                                                                 --           1726           --           1517          NA           138           --           --           --           --           --          138           --           141           POTASSIUM    3.8           --           --          4.2          4.0           --          4.0            < >        3.7           CHLORIDE     110*          --           --           --           --           --          106           --          107           CO2          22            --           --           --           --           --          23 --          28            BUN          13           12            --           --           --           --          16            --          13            CR           0.54         0.57         0.52         0.65         0.59           < >        0.43*          < >        0.69          ANIONGAP     6             --           --           --           --           --          9             --          6             PANTERA          7.7*          --           --           --           --           --          8.6           --          9.3           GLC          117*          --          191*         206*         119*           < >        111*           < >        124*           < > = values in this interval not displayed.                       Lab Results   Component Value Date    WBC 7.5 02/06/2019    HGB 11.3 (L) 02/08/2019    HCT 33.6 (L) 02/06/2019     02/06/2019     02/06/2019    POTASSIUM 3.8 02/06/2019    CHLORIDE 110 (H) 02/06/2019    CO2 22 02/06/2019    BUN 13 02/06/2019    CR 0.54 02/06/2019     (H) 02/06/2019    PANTERA 7.7 (L) 02/06/2019    ALBUMIN 3.3 (L) 07/28/2015    PROTTOTAL 6.4 (L) 07/28/2015    ALT 25 10/23/2018    AST 20 10/23/2018    ALKPHOS 57 07/28/2015    BILITOTAL 0.2 07/28/2015    TSH 0.84 10/12/2018       Preop Vitals  BP Readings from Last 3 Encounters:   02/08/19 103/57   02/06/19 108/54   02/04/19 112/72    Pulse Readings from Last 3 Encounters:   02/06/19 77   10/16/18 88   06/05/17 91      Resp Readings from Last 3 Encounters:   02/08/19 16   02/06/19 16   10/16/18 16    SpO2 Readings from  "Last 3 Encounters:   10/16/18 96%   06/05/17 100%   05/12/17 99%      Temp Readings from Last 1 Encounters:   02/08/19 97.6  F (36.4  C) (Oral)    Ht Readings from Last 1 Encounters:   02/05/19 1.702 m (5' 7\")      Wt Readings from Last 1 Encounters:   02/05/19 83.5 kg (184 lb)    Estimated body mass index is 28.82 kg/m  as calculated from the following:    Height as of 2/5/19: 1.702 m (5' 7\").    Weight as of 2/5/19: 83.5 kg (184 lb).       Anesthesia Plan      History & Physical Review      ASA Status:  3 .  OB Epidural Asa: 3            Postoperative Care      Consents  Anesthetic plan, risks, benefits and alternatives discussed with:  Patient..                 Anthony Mccauley MD                    .  "

## 2019-02-08 NOTE — PROVIDER NOTIFICATION
02/08/19 0930   Provider Notification   Provider Name/Title Dr. Irving   Method of Notification At Bedside   Dr. Irving at bedside to discuss Type I DM management with pt.

## 2019-02-08 NOTE — H&P
[facility] Labor and Delivery History and Physical    Guerline Rock MRN# [id number]   Age: 33 year old YOB: 1985     Date of Admission:  2019    Primary care provider: Meghan Quinn           Chief Complaint:   Guerline Rock is a 33 year old female who is  @ 36w3d pregnant and being admitted for induction of labor, indication uncontrolled diabetes type 1 and GBS positive.  She is 36 3/7 weeks EGA and I discussed the case with MFM this week who recommended delivery due to uncontrolled glucoses.  She has had glucoses in the 200s this week despite having an insulin pump, followed by 60s and 80s with increase in insulin pump per endocrinology.  She was admitted and glucoses were controlled.  However upon discharge home she reported glucoses of 189 with the correct doses of insulin.  IOL is planned.  Upon admission she reports last night her glucose was up to 350 because her pump stopped working.  She presents now for IOL.  In this situation betamethasone was contra-indicated.           Pregnancy history:     OBSTETRIC HISTORY:    Obstetric History       T2      L2     SAB0   TAB0   Ectopic0   Multiple0   Live Births2       # Outcome Date GA Lbr Bernardo/2nd Weight Sex Delivery Anes PTL Lv   3 Current            2 Term 16 37w1d  4.269 kg (9 lb 6.6 oz) F Vag-Spont EPI N ALEJANDRA      Apgar1:  8                Apgar5: 9   1 Term 14 37w2d 07:15 / 05:00 3.73 kg (8 lb 3.6 oz) M Vag-Spont EPI  ALEJANDRA      Apgar1:  8                Apgar5: 9          EDC: Estimated Date of Delivery: Mar 5, 2019    Prenatal Labs:   Lab Results   Component Value Date    ABO B 2018    RH Pos 2018    AS Neg 2018    HEPBANG Nonreactive 2018    CHPCRT Negative 2018    GCPCRT Negative 2018    TREPAB Negative 2015    HGB 11.3 (L) 2019       GBS Status:   Lab Results   Component Value Date    GBS Positive (A) 2019       Active Problem List  Patient  Active Problem List   Diagnosis     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     S/P lumbar discectomy     Type 1 diabetes mellitus without complication (H)     Pregnancy and insulin-dependent diabetes mellitus in third trimester (H)     Group B Streptococcus carrier, +RV culture, currently pregnant     High-risk pregnancy     Insulin-dependent diabetes mellitus during pregnancy, antepartum (H)     Diabetes (H)       Medication Prior to Admission  Medications Prior to Admission   Medication Sig Dispense Refill Last Dose     insulin aspart (NOVOLOG FLEXPEN) 100 UNIT/ML pen Inject 4 Units Subcutaneous Take with snacks or supplements for high blood sugar   2/8/2019 at Unknown time     insulin glargine (LANTUS SOLOSTAR PEN) 100 UNIT/ML pen Inject 20 Units Subcutaneous At Bedtime   2/7/2019 at Unknown time     Prenatal Vit-Fe Fumarate-FA (PRENATAL MULTIVITAMIN PLUS IRON) 27-0.8 MG TABS per tablet Take 1 tablet by mouth daily   2/7/2019 at Unknown time     blood glucose monitoring (NO BRAND SPECIFIED) test strip Use to test blood sugars 6 times daily or as directed 100 strip 11 Taking     guaiFENesin (MUCINEX) 600 MG 12 hr tablet Take 1 tablet (600 mg) by mouth 2 times daily as needed for congestion (Patient not taking: Reported on 11/19/2018) 20 tablet 0 Not Taking   .        Maternal Past Medical History:     Past Medical History:   Diagnosis Date     Diabetes mellitus type 1 (H) 1/16/2014                       Family History:   This patient has no significant family history            Social History:   This patient has no significant social history         Review of Systems:   CONSTITUTIONAL: NEGATIVE for fever, chills, change in weight  INTEGUMENTARY/SKIN: NEGATIVE for worrisome rashes, moles or lesions  EYES: NEGATIVE for vision changes or irritation  ENT/MOUTH: NEGATIVE for ear, mouth and throat problems  RESP: NEGATIVE for significant cough or SOB  BREAST: NEGATIVE for masses, tenderness or discharge  CV: NEGATIVE  for chest pain, palpitations or peripheral edema  GI: NEGATIVE for nausea, abdominal pain, heartburn, or change in bowel habits  : NEGATIVE for frequency, dysuria, or hematuria  MUSCULOSKELETAL: NEGATIVE for significant arthralgias or myalgia  NEURO: NEGATIVE for weakness, dizziness or paresthesias  ENDOCRINE: NEGATIVE for temperature intolerance, skin/hair changes  HEME: NEGATIVE for bleeding problems  PSYCHIATRIC: NEGATIVE for changes in mood or affect          Physical Exam:     Vitals were reviewed  All vitals stable  Patient Vitals for the past 12 hrs:   BP Temp Temp src Resp   02/08/19 0830 114/57 98.9  F (37.2  C) Oral 18     Constitutional: Awake, alert, cooperative, no apparent distress, and appears stated age.  Eyes: Lids and lashes normal, pupils equal, round and reactive to light, extra ocular muscles intact, sclera clear, conjunctiva normal.  ENT: Normocephalic, without obvious abnormality, atramatic, sinuses nontender on palpation, external ears without lesions, oral pharynx with moist mucus membranes, tonsils without erythema or exudates, gums normal and good dentition.  Neck: Supple, symmetrical, trachea midline, no adenopathy, thyroid symmetric, not enlarged and no tenderness, skin normal.  Hematologic / Lymphatic: No cervical lymphadenopathy and no supraclavicular lymphadenopathy.  Back: Symmetric, no curvature, spinous processes are non-tender on palpation, paraspinous muscles are non-tender on palpation, no costal vertebral tenderness.  Lungs: No increased work of breathing, good air exchange, clear to auscultation bilaterally, no crackles or wheezing.  Cardiovascular: Regular rate and rhythm, normal S1 and S2, no S3 or S4, and no murmur noted.  Chest / Breast: Breasts symmetrical, skin without lesion(s), no nipple retraction or dimpling, no nipple discharge, no masses palpated, no axillary or supraclavicular adenopathy.  Abdomen: No scars, normal bowel sounds, soft, non-distended, non-tender,  no masses palpated, no hepatosplenomegally.  Genitourinary: No urethral discharge, normal external genitalia, no hernia.  Musculoskeletal: No redness, warmth, or swelling of the joints.  Full range of motion noted.  Motor strength is 5 out of 5 all extremities bilaterally.  Tone is normal.  Neurologic: Awake, alert, oriented to name, place and time.  Cranial nerves II-XII are grossly intact.  Motor is 5 out of 5 bilaterally.  Cerebellar finger to nose, heel to shin intact.  Sensory is intact.  Babinski down going, Romberg negative, and gait is normal.  Neuropsychiatric: Normal affect, mood, orientation, memory and insight.  Skin: No rashes, erythema, pallor, petechia or purpura.   Cervix:   Membranes: intact   Dilation: 3   Effacement: 50%   Station:-2   Consistency: soft   Position: Mid  Presentation:Cephalic  Fetal Heart Rate Tracing: Tier 1 (normal)  Tocometer: frequency q 2-8 minutes      EFW   7 # per [facility] us  19        Assessment:   Guerline Rock is a 33 year old female who is  @ 36w3d pregnant and being admitted for induction of labor, indication uncontrolled diabetes type 1 and GBS positive.  She is 36 3/7 weeks EGA and I discussed the case with [facility] this week who recommended delivery due to uncontrolled glucoses.  She has had glucoses in the 200s this week despite having an insulin pump, followed by 60s and 80s with increase in insulin pump per endocrinology.  She was admitted and glucoses were controlled.  However upon discharge home she reported glucoses of 189 with the correct doses of insulin.  IOL is planned.  Upon admission she reports last night her glucose was up to 350 because her pump stopped working.  She presents now for IOL.  In this situation betamethasone was contra-indicated.           Plan:   Admit - see IP orders  Labor induction with Pitocin now, arom after PCN x 4 hours   PCN for GBS pos   Insulin drip for type 1 DM, along with hospitalist consult for recommendations.   Post  delivery insulin pump ordered in chart in problem list and note from 2/4/19.   Reassuring fetal status    Meghan Quinn, DO

## 2019-02-08 NOTE — PROVIDER NOTIFICATION
02/08/19 1609   Provider Notification   Provider Name/Title Dr. Quinn   Method of Notification Phone   Request Evaluate - Remote   Updated Dr. Quinn that pt is now comfortable with epidural infusing. SVE after epidural 4.5/80/-1. Ctx q2-3 min on 10 of Pitocin. FHT's min-mod variability with accelerations and an occasional small VD. Insulin gtt off earlier but now back on (last ). No new orders at this time.

## 2019-02-08 NOTE — PROVIDER NOTIFICATION
02/08/19 1237   Provider Notification   Provider Name/Title Dr. Quinn   Method of Notification At Bedside   Dr. Quinn at bedside. Updated MD that 2nd dose of IV PCN is due at 1337. SVE by MD 4/50/-2, AROM performed for small amount of clear fluid. Updated MD on FHT's with min-mod variability and accelerations present. Updated that pt is on 10 of Pitocin, not yet very uncomfortable. Pt plans for epidural. Updated MD on last BG of 166 with current Insulin gtt at 1.5 units/hr. MD states she is heading over to clinic and to page when/if needed. No new orders at this time.

## 2019-02-08 NOTE — PROGRESS NOTES
S:  Julio   O:/57   Temp 97.6  F (36.4  C) (Oral)   Resp 16   LMP 2018 (LMP Unknown)    : /-2 clear fluid with arom     FHT's Category 1 with baseline 150s  EFW 7# per recent MFM ultrasound     A:  33 year old y/o  @ 36w3d  wks EGA with IOL 2/2 uncontrolled type 1 DM and GBS positive  P:  AROM, pitocin,  IV started previously, fetal status reassuring, s/p PCN dose given @ 0930 am.     Dr. Meghan Quinn, DO    OB/GYN   [facility]

## 2019-02-09 LAB
GLUCOSE BLDC GLUCOMTR-MCNC: 160 MG/DL (ref 70–99)
GLUCOSE BLDC GLUCOMTR-MCNC: 167 MG/DL (ref 70–99)
GLUCOSE BLDC GLUCOMTR-MCNC: 288 MG/DL (ref 70–99)
GLUCOSE BLDC GLUCOMTR-MCNC: 326 MG/DL (ref 70–99)
GLUCOSE BLDC GLUCOMTR-MCNC: 88 MG/DL (ref 70–99)
HGB BLD-MCNC: 10.7 G/DL (ref 11.7–15.7)
T PALLIDUM AB SER QL: NONREACTIVE

## 2019-02-09 PROCEDURE — 36415 COLL VENOUS BLD VENIPUNCTURE: CPT | Performed by: FAMILY MEDICINE

## 2019-02-09 PROCEDURE — 85018 HEMOGLOBIN: CPT | Performed by: FAMILY MEDICINE

## 2019-02-09 PROCEDURE — 25000131 ZZH RX MED GY IP 250 OP 636 PS 637: Performed by: INTERNAL MEDICINE

## 2019-02-09 PROCEDURE — 00000146 ZZHCL STATISTIC GLUCOSE BY METER IP

## 2019-02-09 PROCEDURE — 40000084 ZZH STATISTIC IP LACTATION SERVICES 16-30 MIN

## 2019-02-09 PROCEDURE — 25000132 ZZH RX MED GY IP 250 OP 250 PS 637: Performed by: FAMILY MEDICINE

## 2019-02-09 PROCEDURE — 12000000 ZZH R&B MED SURG/OB

## 2019-02-09 PROCEDURE — 99232 SBSQ HOSP IP/OBS MODERATE 35: CPT | Performed by: INTERNAL MEDICINE

## 2019-02-09 PROCEDURE — 25000131 ZZH RX MED GY IP 250 OP 636 PS 637: Performed by: FAMILY MEDICINE

## 2019-02-09 RX ADMIN — INSULIN GLARGINE 10 UNITS: 100 INJECTION, SOLUTION SUBCUTANEOUS at 08:48

## 2019-02-09 RX ADMIN — SENNOSIDES AND DOCUSATE SODIUM 1 TABLET: 8.6; 5 TABLET ORAL at 21:09

## 2019-02-09 RX ADMIN — INSULIN ASPART 7 UNITS: 100 INJECTION, SOLUTION INTRAVENOUS; SUBCUTANEOUS at 19:01

## 2019-02-09 RX ADMIN — INSULIN GLARGINE 15 UNITS: 100 INJECTION, SOLUTION SUBCUTANEOUS at 22:46

## 2019-02-09 RX ADMIN — IBUPROFEN 800 MG: 800 TABLET ORAL at 14:37

## 2019-02-09 RX ADMIN — SENNOSIDES AND DOCUSATE SODIUM 1 TABLET: 8.6; 5 TABLET ORAL at 08:29

## 2019-02-09 RX ADMIN — IBUPROFEN 800 MG: 800 TABLET ORAL at 02:11

## 2019-02-09 RX ADMIN — IBUPROFEN 800 MG: 800 TABLET ORAL at 08:29

## 2019-02-09 NOTE — PLAN OF CARE
Data: Guerline Rock transferred to postpartum unit via wheelchair at 2230. Baby transferred via parent's arms.  Action: Receiving unit notified of transfer: Yes. Patient and family notified of room change. Belongings sent to receiving unit. Accompanied by Registered Nurse. Oriented patient to surroundings. Call light within reach. ID bands double-checked with receiving RN.  Response: Patient tolerated transfer and is stable.

## 2019-02-09 NOTE — PLAN OF CARE
VSS. Up independently to void. Ibuprofen for cramping. Type 1 DM, uncontrolled- see insulin orders and previous OB/hospitalist notes. Needing moderate assistance with breastfeeding. SO at bedside, supportive. Would like a pump at discharge.     0530am- Started pumping this morning to be able to supplement EBM to baby. Patient is open to using donor milk supplementation if baby's blood glucoses keep trending downward.

## 2019-02-09 NOTE — PROGRESS NOTES
[facility]    Post-Partum Progress Note    Assessment & Plan   Assessment:  Post-partum day #1  Normal spontaneous vaginal delivery  Type 1 Diabetes Mellitus   No excessive bleeding  Pain well-controlled.    Plan:  Ambulation encouraged  Pain control measures as needed  Remove 2nd IV, left hand  On Lantus 15 U HS, Lantus 10 U added this AM by hospitalist, has   carbohydrate coverage and sliding scale ordered  Anticipate discharge tomorrow      Marifer Brenda DO  [facility]    Interval History   Doing well.  Pain is well-controlled.  No fevers.  No history of foul-smelling vaginal discharge.  Good appetite.  Denies chest pain, shortness of breath, nausea or vomiting.  Vaginal bleeding is similar to a moderate menstrual flow.  Ambulatory.  Pumping.    Medications     nitrous oxide/oxygen 50/50 blend       NO Rho (D) immune globulin (RhoGam) needed - mother Rh POSITIVE       oxytocin in 0.9% NaCl       oxytocin in 0.9% NaCl         insulin aspart   Subcutaneous QAM AC     insulin aspart   Subcutaneous Daily with lunch     insulin aspart   Subcutaneous Daily with supper     insulin aspart  1-3 Units Subcutaneous TID AC     insulin aspart  1-3 Units Subcutaneous At Bedtime     insulin glargine  10 Units Subcutaneous QAM AC     insulin glargine  15 Units Subcutaneous At Bedtime     measles, mumps and rubella vaccine  0.5 mL Subcutaneous Once     senna-docusate  1 tablet Oral BID    Or     senna-docusate  2 tablet Oral BID     Tdap (tetanus-diphtheria-acell pertussis)  0.5 mL Intramuscular Once       Physical Exam   Temp: 98.1  F (36.7  C) Temp src: Oral BP: 105/61   Heart Rate: 60 Resp: 16 SpO2: 98 %      There were no vitals filed for this visit.  Vital Signs with Ranges  Temp:  [97.6  F (36.4  C)-99  F (37.2  C)] 98.1  F (36.7  C)  Heart Rate:  [60-65] 60  Resp:  [16-18] 16  BP: ()/(50-71) 105/61  SpO2:  [94 %-99 %] 98 %  I/O last 3 completed shifts:  In: -   Out: 1738 [Urine:1250;  Blood:488]    Uterine fundus is firm, non-tender and at the level of the umbilicus  Extremities Non-tender    Data   Recent Labs   Lab Test 02/08/19  0915   ABO B   RH Pos   AS Neg     Recent Labs   Lab Test 02/09/19  0641 02/08/19  0915   HGB 10.7* 11.3*     Recent Labs   Lab Test 08/22/18  1130   RUQIGG 17

## 2019-02-09 NOTE — PROVIDER NOTIFICATION
02/09/19 0804   Provider Notification   Provider Name/Title Hospitalist Ahmad   Method of Notification Phone   Request Evaluate-Remote   Notification Reason Status Update   Hospitalist ordered lantus coverage for the morning in addition to carb coverage and sliding scale orders. Primary RN notified.

## 2019-02-09 NOTE — PROVIDER NOTIFICATION
02/09/19 0146   Provider Notification   Provider Name/Title Hospitalist Graeme   Method of Notification Phone   Request Evaluate-Remote   Notification Reason Status Update   Blood glucose at 0100 was 288. Patient was given lantus 15 units at bedtime but no sliding scale per orders. Orders received to give 1 time dose of 3 units novolog now. Ok to check next blood sugar at breakfast

## 2019-02-09 NOTE — PLAN OF CARE
Meeting goals for shift, see flow sheet. Blood sugar 326 before breakfast and 88 at lunch time, insulin as per carb count and blood sugar result. Caring for self and infant in room with spouse. Patient is comfortable,using IBU, is pumping. Anticipate discharge to home tomorrow.

## 2019-02-09 NOTE — PROVIDER NOTIFICATION
"   02/09/19 0741   Provider Notification   Provider Name/Title Hospitalist   Method of Notification Electronic Page   Request Evaluate-Remote   Notification Reason Status Update   Upon entering the patient's room for bedside report, patient hesitantly informed nursing staff that she had self administered 2 units of novolog since she had checked her own blood glucose and \"it was still over 200\". She used her home supply of medication. Text-paged hospitalist Brenda ([phone]) to please advise RN as this may effect the insulin coverage at breakfast.  "

## 2019-02-09 NOTE — PROVIDER NOTIFICATION
02/08/19 1751   Provider Notification   Provider Name/Title Dr. Quinn   Method of Notification At Bedside   Request Attend Delivery   Dr. Quinn at bedside to attend delivery.

## 2019-02-09 NOTE — PROGRESS NOTES
"[facility]    Medicine Progress Note - Hospitalist Service       Date of Admission:  2/8/2019  Assessment & Plan      Guerline Rock is a 33 year old female with type I DM who is postpartum day #1 is recovering, consulted hospitalist for DM management.    1. Type I DM with hyperglycemia: was on insulin pump, but turned off. BG is around high 200\"s. She took 2 units of her insulin after she checked he BG earlier; d/w patient to watch the staff to give insulin and manage while inpatient and she expressed understanding. Insulin plan for now;  - Lantus 10 units Qam and 15 units Qpm  - Novolog sliding scale insulin  - Novolog carb count with meals.  Lab Results   Component Value Date    A1C 6.2 10/12/2018    A1C 7.0 05/14/2018    A1C 6.6 05/23/2017    A1C 6.5 05/05/2017    A1C 6.3 12/28/2016     2. Postpartum day #1, as per ob/yn team. Normal vaginal delivery.      Diet: Room Service  Regular Diet Adult    DVT Prophylaxis: Low Risk/Ambulatory with no VTE prophylaxis indicated and Pneumatic Compression Devices  Gardiner Catheter: not present  Code Status: full code    Disposition Plan   Expected discharge: Tomorrow, recommended to prior living arrangement once postpartum care as protocol for ob/gyn team.  Entered: Dwight Alfaro MD 02/09/2019, 12:55 PM       The patient's care was discussed with the Bedside Nurse, Patient and Primary team.    Dwight Alfaro MD  Hospitalist Service  [facility]    ______________________________________________________________________    Interval History   Patient had normal vaginal delivery yesterday without complications.  Her blood glucose at variable mainly above 200s.  She denies chest pain, shortness of breath, fever, edema lower extremities.    Data reviewed today: I reviewed all medications, new labs and imaging results over the last 24 hours. I personally reviewed no images or EKG's today.    Physical Exam   Vital Signs: Temp: 97.4  F (36.3  C) Temp " src: Oral BP: 119/66   Heart Rate: 68 Resp: 16 SpO2: 98 %      Weight: 0 lbs 0 oz  GENERAL:  No acute distress.  SKIN:  Dry and warm.  There is no rash, lesions, ulcers or juandice at area of skin examined.  HEENT:  Head without trauma.  Pupils round, reactive. Exam of conjunctiva and lids are normal. Sclera without icterus. There is no oral thrush.  NECK:  Supple.  There is no cervical adenopathy, no thyromegaly. No jugular venous distension.  CHEST: No reproducible chest tenderness.   LUNGS:  Normal respiratory effort. Lungs are Clear on ascultation.  HEART:  Regular rate and rhythm.  No murmur, gallops or rubs auscultated.  ABDOMEN:  Soft, bowel sounds positive.  There is no tenderness or guarding.   EXTREMITIES: No edema. Normal range of motion. No calf swelling or tenderness.  NEUROLOGIC:  Alert and oriented x3. Moving all ext. Gait not tested.      Data   Recent Labs   Lab 02/09/19  0641 02/08/19  0915 02/06/19  0624   WBC  --   --  7.5   HGB 10.7* 11.3* 11.3*   MCV  --   --  89   PLT  --   --  219   NA  --   --  138   POTASSIUM  --   --  3.8   CHLORIDE  --   --  110*   CO2  --   --  22   BUN  --   --  13   CR  --   --  0.54   ANIONGAP  --   --  6   PANTERA  --   --  7.7*   GLC  --   --  117*     No results found for this or any previous visit (from the past 24 hour(s)).  Medications     nitrous oxide/oxygen 50/50 blend       NO Rho (D) immune globulin (RhoGam) needed - mother Rh POSITIVE       oxytocin in 0.9% NaCl       oxytocin in 0.9% NaCl         insulin aspart   Subcutaneous QAM AC     insulin aspart   Subcutaneous Daily with lunch     insulin aspart   Subcutaneous Daily with supper     insulin aspart  1-3 Units Subcutaneous TID AC     insulin aspart  1-3 Units Subcutaneous At Bedtime     insulin glargine  10 Units Subcutaneous QAM AC     insulin glargine  15 Units Subcutaneous At Bedtime     measles, mumps and rubella vaccine  0.5 mL Subcutaneous Once     senna-docusate  1 tablet Oral BID    Or      senna-docusate  2 tablet Oral BID     Tdap (tetanus-diphtheria-acell pertussis)  0.5 mL Intramuscular Once

## 2019-02-09 NOTE — ANESTHESIA POSTPROCEDURE EVALUATION
Patient: Guerline Rock    * No procedures listed *    Diagnosis:* No pre-op diagnosis entered *  Diagnosis Additional Information: Labor pain    Anesthesia Type:  Epidural    Note:  Anesthesia Post Evaluation    Patient location during evaluation: PACU  Patient participation: Able to fully participate in evaluation  Level of consciousness: awake  Pain management: adequate  Airway patency: patent  Cardiovascular status: acceptable  Respiratory status: acceptable  Hydration status: acceptable  PONV: controlled     Anesthetic complications: None    Comments: .Labor Epidural Post delivery note.      Doing well. VSS Temp normal. Satisfactory respiratory and cardiovascular function. Return of neurologic function. Questions encouraged and answered. Denies positional headache. Minimal side effects easily managed w/ PRN meds. No apparent anesthetic complications. No follow-up required.    I or my partner was immediately available for epidural management.    BARBIE Noriega          Last vitals:  Vitals:    02/08/19 2104 02/09/19 0107 02/09/19 0423   BP: 104/58 108/61 105/61   Resp: 16 16 16   Temp:  98.1  F (36.7  C)    SpO2:            Electronically Signed By: Placido Noriega DO  February 9, 2019  9:27 AM

## 2019-02-09 NOTE — PROVIDER NOTIFICATION
02/08/19 2244   Provider Notification   Provider Name/Title Hospitalist Spenser   Method of Notification Phone   Request Evaluate-Remote   Notification Reason Status Update   Hospitalist ordered 5 units of novolog be given to cover patient's supper ([employer] sandwich, approx 80 carbs per patient's report). This writer notified hospitalist of OB's postpartum insulin orders. Plan to give lantus 15 units at bedtime but don't give sliding scale at bedtime. Also check BG again around 0100 but do not cover with sliding scale per hosp.

## 2019-02-09 NOTE — LACTATION NOTE
Lactation visit. Baby has been sleepy and needing to nurse to stabilize blood sugars as mom is a type 1 diabetic. She was given a nipple shield to ensure nursing. Mom was lying on her L side nursing at time of visit. Assisted with a few changes and enc breast compression and baby nursed more consistently with active swallowing. This was a very good feeding for baby. Reviewed nipple shield use and care and best time and way to wean from the shield.  Encouraged Mom to call us PRN and plan phone follow up within one week after discharge since going home on a shield.

## 2019-02-10 VITALS
OXYGEN SATURATION: 98 % | DIASTOLIC BLOOD PRESSURE: 75 MMHG | SYSTOLIC BLOOD PRESSURE: 112 MMHG | RESPIRATION RATE: 18 BRPM | HEART RATE: 62 BPM | TEMPERATURE: 97.6 F

## 2019-02-10 LAB — GLUCOSE BLDC GLUCOMTR-MCNC: 198 MG/DL (ref 70–99)

## 2019-02-10 PROCEDURE — 25000132 ZZH RX MED GY IP 250 OP 250 PS 637: Performed by: FAMILY MEDICINE

## 2019-02-10 PROCEDURE — 99232 SBSQ HOSP IP/OBS MODERATE 35: CPT | Performed by: INTERNAL MEDICINE

## 2019-02-10 PROCEDURE — 25000131 ZZH RX MED GY IP 250 OP 636 PS 637: Performed by: INTERNAL MEDICINE

## 2019-02-10 PROCEDURE — 00000146 ZZHCL STATISTIC GLUCOSE BY METER IP

## 2019-02-10 RX ADMIN — ACETAMINOPHEN 650 MG: 325 TABLET, FILM COATED ORAL at 05:43

## 2019-02-10 RX ADMIN — SENNOSIDES AND DOCUSATE SODIUM 2 TABLET: 8.6; 5 TABLET ORAL at 08:31

## 2019-02-10 RX ADMIN — INSULIN GLARGINE 10 UNITS: 100 INJECTION, SOLUTION SUBCUTANEOUS at 09:13

## 2019-02-10 RX ADMIN — IBUPROFEN 800 MG: 800 TABLET ORAL at 02:08

## 2019-02-10 NOTE — PLAN OF CARE
Ind with self and baby cares. FOB helpful and supportive at the bedside. Managing pain with ibuprofen per emar, knows tylenol is available as well. Complains of constipation, RN encouraged continued senna use. Bonding well with baby.

## 2019-02-10 NOTE — PROGRESS NOTES
[facility] Obstetrics Post-Partum Progress Note          Assessment and Plan:    Assessment:   Post-partum day #2  Induced vaginal delivery  33 year old y/o  @ 36w3d  wks EGA with IOL associated with  uncontrolled type 1 DM and GBS positive      L&D complications: As above  Insulin sliding scale reviewed with pt  She understands and accepts  She follows with Dr Rojas from [facility]      Doing well.  Clean wound without signs of infection.  Normal healing wound.  No excessive bleeding  Pain well-controlled.      Plan:   Ambulation encouraged  Breast feeding/pumping strategies discussed  Monitor wound for signs of infection  Pain control measures as needed  Reportable signs and symptoms dicussed with the patient  Pt to follow insulin rec as per hospitalist with f/u with endocrine this week  Take your tempature twice daily for 3 days and call if > 100.4  Nothing in vagina for 6 wks  Continue taking prenatal MVI daily for 1 month    Discharge later today           Interval History:   Doing well.  Pain is well-controlled.  No fevers.  No history of foul-smelling vaginal discharge.  Good appetite.  Denies chest pain, shortness of breath, nausea or vomiting.  Vaginal bleeding is similar to a heavy menstrual flow.  Ambulatory.  Breastfeeding/pumping well.          Significant Problems:      Past Medical History:   Diagnosis Date     Diabetes mellitus type 1 (H) 2014             Review of Systems:    The patient denies any chest pain, shortness of breath, excessive pain, fever, chills, purulent drainage from the wound, nausea or vomiting.          Medications:     All medications related to the patient's surgery have been reviewed  Current Facility-Administered Medications   Medication     acetaminophen (TYLENOL) tablet 650 mg     bisacodyl (DULCOLAX) Suppository 10 mg     glucose gel 15-30 g    Or     dextrose 50 % injection 25-50 mL    Or     glucagon injection 1 mg     hydrocortisone 2.5 % cream      ibuprofen (ADVIL/MOTRIN) tablet 800 mg     insulin aspart (NovoLOG) inj (RAPID ACTING)     insulin aspart (NovoLOG) inj (RAPID ACTING)     insulin aspart (NovoLOG) inj (RAPID ACTING)     insulin aspart (NovoLOG) inj (RAPID ACTING)     insulin aspart (NovoLOG) inj (RAPID ACTING)     insulin aspart (NovoLOG) inj (RAPID ACTING)     insulin glargine (LANTUS) injection 10 Units     insulin glargine (LANTUS) injection 15 Units     lactated ringers BOLUS 1,000 mL     lanolin ointment     misoprostol (CYTOTEC) tablet 800 mcg     naloxone (NARCAN) injection 0.1-0.4 mg     nitrous oxide/oxygen 50/50 blend     NO Rho (D) immune globulin (RhoGam) needed - mother Rh POSITIVE     oxyCODONE (ROXICODONE) tablet 5 mg     oxytocin (PITOCIN) 30 units in 500 mL 0.9% NaCl infusion     oxytocin (PITOCIN) 30 units in 500 mL 0.9% NaCl infusion     oxytocin (PITOCIN) injection 10 Units     senna-docusate (SENOKOT-S/PERICOLACE) 8.6-50 MG per tablet 1 tablet    Or     senna-docusate (SENOKOT-S/PERICOLACE) 8.6-50 MG per tablet 2 tablet     sodium phosphate (FLEET ENEMA) 1 enema     tranexamic acid (CYKLOKAPRON) infusion 1 g             Physical Exam:   Vitals were reviewed  All vitals stable  Temp: 97.6  F (36.4  C) Temp src: Oral BP: 112/75 Pulse: 62 Heart Rate: 78 Resp: 18        Uterine fundus is firm, non-tender and at the level of the umbilicus          Data:     All laboratory data related to this surgery reviewed  Hemoglobin   Date Value Ref Range Status   02/09/2019 10.7 (L) 11.7 - 15.7 g/dL Final   02/08/2019 11.3 (L) 11.7 - 15.7 g/dL Final   02/06/2019 11.3 (L) 11.7 - 15.7 g/dL Final   10/23/2018 11.7 11.7 - 15.7 g/dL Final   08/22/2018 13.1 11.7 - 15.7 g/dL Final   BS values reviewed  No imaging studies have been ordered    Yehuda Charles MD

## 2019-02-10 NOTE — PROGRESS NOTES
"NUTRITION ASSESSMENT    REASON FOR ASSESSMENT:    Admission Nutrition Risk Screen for  New/Uncontrolled Diabetes     CURRENT DIET AND NOURISHMENT ORDER:  Information obtained from chart review  Diet: Regular  Current Intake/Tolerance: No intake documented    Lab Results   Component Value Date    A1C 6.2 10/12/2018    A1C 7.0 05/14/2018    A1C 6.6 05/23/2017    A1C 6.5 05/05/2017    A1C 6.3 12/28/2016         NUTRITION STATUS VALIDATION:  Weight status: Overweight BMI 25-29.9  Ht Readings from Last 2 Encounters:   02/05/19 1.702 m (5' 7\")   02/04/19 1.676 m (5' 6\")     MALNUTRITION:  Patient does not meet two of the following criteria necessary for diagnosing malnutrition: significant weight loss, reduced intake, subcutaneous fat loss, muscle loss or fluid retention    NUTRITION DIAGNOSIS:  No nutrition diagnosis at this time.    INTERVENTION:    Nutrition Prescription:     A1c <7. Resume previous diet recommendations    Implementation:      Diet education - per MD order or as appropriate    Follow Up/Monitoring:      No need for further follow up unless another consult is received.      Keke Rollins RDN   3rd floor/ICU: [phone]  All other floors: [phone]  Weekend/holiday: [phone]  Office: [phone]  "

## 2019-02-10 NOTE — PLAN OF CARE
Pt stable, vitals WNL. Breastfeeding well, supplementing with donor milk. Pt declines pain. Insulin given as ordered. Discharge instructions reviewed with pt all questions answered. Breast pump sent with pt. Discharged home at 1035 with  and .

## 2019-02-10 NOTE — PROGRESS NOTES
[facility]    Medicine Progress Note - Hospitalist Service       Date of Admission:  2/8/2019  Assessment & Plan      Guerline Rock is a 33 year old female with type I DM who is postpartum day #2, consulted hospitalist for DM management.    1. Type I DM with hyperglycemia: was on insulin pump, but turned off. BG is around , improving. Discharge below orders and then follow up her Endocrinologist and restart her insulin pump on Monday.  - Lantus 10 units Qam and 15 units Qpm  - Novolog sliding scale insulin  - Novolog carb count with meals.  Lab Results   Component Value Date    A1C 6.2 10/12/2018    A1C 7.0 05/14/2018    A1C 6.6 05/23/2017    A1C 6.5 05/05/2017    A1C 6.3 12/28/2016     2. Postpartum day #2, as per ob/yn team. Normal vaginal delivery.      Diet: Room Service  Regular Diet Adult  Diet  Diet    DVT Prophylaxis: Low Risk/Ambulatory with no VTE prophylaxis indicated and Pneumatic Compression Devices  Gardiner Catheter: not present  Code Status: full code    Disposition Plan   Expected discharge: to  Day as per ob/gyn, recommended to prior living arrangement once postpartum care as protocol for ob/gyn team.  Entered: Dwight Alfaro MD 02/10/2019, 9:22 AM       The patient's care was discussed with the Bedside Nurse, Patient and Primary team.    Dwight Alfaro MD  Hospitalist Service  [facility]    ______________________________________________________________________    Interval History   Patient denies any complaints and BG improving with current regimen.    Data reviewed today: I reviewed all medications, new labs and imaging results over the last 24 hours. I personally reviewed no images or EKG's today.    Physical Exam   Vital Signs: Temp: 97.6  F (36.4  C) Temp src: Oral BP: 112/75 Pulse: 62 Heart Rate: 78 Resp: 18        Weight: 0 lbs 0 oz  GENERAL:  No acute distress.  SKIN:  Dry and warm.  There is no rash, lesions, ulcers or juandice at area of skin  examined.  HEENT:  Head without trauma.  Pupils round, reactive. Exam of conjunctiva and lids are normal. Sclera without icterus. There is no oral thrush.  NECK:  Supple.  There is no cervical adenopathy, no thyromegaly. No jugular venous distension.  CHEST: No reproducible chest tenderness.   LUNGS:  Normal respiratory effort. Lungs are Clear on ascultation.  HEART:  Regular rate and rhythm.  No murmur, gallops or rubs auscultated.  ABDOMEN:  Soft, bowel sounds positive.  There is no tenderness or guarding.   EXTREMITIES: No edema. Normal range of motion. No calf swelling or tenderness.  NEUROLOGIC:  Alert and oriented x3. Moving all ext. Gait not tested.      Data   Recent Labs   Lab 02/09/19  0641 02/08/19  0915 02/06/19  0624   WBC  --   --  7.5   HGB 10.7* 11.3* 11.3*   MCV  --   --  89   PLT  --   --  219   NA  --   --  138   POTASSIUM  --   --  3.8   CHLORIDE  --   --  110*   CO2  --   --  22   BUN  --   --  13   CR  --   --  0.54   ANIONGAP  --   --  6   PANTERA  --   --  7.7*   GLC  --   --  117*     No results found for this or any previous visit (from the past 24 hour(s)).  Medications     nitrous oxide/oxygen 50/50 blend       NO Rho (D) immune globulin (RhoGam) needed - mother Rh POSITIVE       oxytocin in 0.9% NaCl       oxytocin in 0.9% NaCl         insulin aspart   Subcutaneous QAM AC     insulin aspart   Subcutaneous Daily with lunch     insulin aspart   Subcutaneous Daily with supper     insulin aspart  1-3 Units Subcutaneous TID AC     insulin aspart  1-3 Units Subcutaneous At Bedtime     insulin glargine  10 Units Subcutaneous QAM AC     insulin glargine  15 Units Subcutaneous At Bedtime     senna-docusate  1 tablet Oral BID    Or     senna-docusate  2 tablet Oral BID

## 2019-02-10 NOTE — DISCHARGE INSTRUCTIONS
Postpartum Vaginal Delivery Instructions  Call Dr. Rojas your Endocrinologist for an appointment this week  Lactation contact information [phone]    Activity       Ask family and friends for help when you need it.    Do not place anything in your vagina for 6 weeks.    You are not restricted on other activities, but take it easy for a few weeks to allow your body to recover from delivery.  You are able to do any activities you feel up to that point.    No driving until you have stopped taking your pain medications (usually two weeks after delivery).     Call your health care provider if you have any of these symptoms:       Increased pain, swelling, redness, or fluid around your stiches from an episiotomy or perineal tear.    A fever above 100.4 F (38 C) with or without chills when placing a thermometer under your tongue.    You soak a sanitary pad with blood within 1 hour, or you see blood clots larger than a golf ball.    Bleeding that lasts more than 6 weeks.    Vaginal discharge that smells bad.    Severe pain, cramping or tenderness in your lower belly area.    A need to urinate more frequently (use the toilet more often), more urgently (use the toilet very quickly), or it burns when you urinate.    Nausea and vomiting.    Redness, swelling or pain around a vein in your leg.    Problems breastfeeding or a red or painful area on your breast.    Chest pain and cough or are gasping for air.    Problems coping with sadness, anxiety, or depression.  If you have any concerns about hurting yourself or the baby, call your provider immediately.     You have questions or concerns after you return home.     Keep your hands clean:  Always wash your hands before touching your perineal area and stitches.  This helps reduce your risk of infection.  If your hands aren't dirty, you may use an alcohol hand-rub to clean your hands. Keep your nails clean and short.

## 2019-02-10 NOTE — PLAN OF CARE
Doing well,    pumping and supplementing with donor milk per finger feed. Declines offers of pain medications this shift, denies difficulty voiding. One touches done and insulin per orders. FOB present and supportive, involved in infant cares.

## 2019-02-13 LAB — COPATH REPORT: NORMAL

## 2019-02-14 ENCOUNTER — TELEPHONE (OUTPATIENT)
Dept: OBGYN | Facility: CLINIC | Age: 34
End: 2019-02-14

## 2019-02-16 NOTE — L&D DELIVERY NOTE
OB Vaginal Delivery Note    Guerline Rock MRN# [id number]   Age: 33 year old YOB: 1985       GA: 36w3d  GP:   Labor Complications: None   EBL:    mL  QBL: 488 mL  Delivery Type: Vaginal, Spontaneous   ROM to Delivery Time: 5h 38m   Weight: 3.25 kg (7 lb 2.6 oz)    1 Minute 5 Minute 10 Minute   Apgar Totals: 9    9          SAMMIE SHIELDS;MIGUEL MADRID;ANDRES HECTOR     Delivery Details:  Guerline Rock, a 33 year old  female delivered a viable infant with apgars of 9   and 9  . Patient was fully dilated and pushing after 2  hours 3  minutes in active labor. Delivery was via vaginal, spontaneous  to a sterile field under    epidural anesthesia. Infant delivered in vertex  left  occiput  anterior  position. Anterior and posterior shoulders delivered without difficulty. The cord was clamped, cut twice and 3 vessels  were noted. Cord blood was obtained in routine fashion with the following disposition: lab .      Cord complications: nuchal   Placenta delivered at 2019  6:18 PM . Placental disposition was Pathology . Fundal massage performed and fundus found to be firm.     Episiotomy: none    Perineum, vagina, cervix were inspected, and the following lacerations were noted:   Perineal lacerations:     periurethral laceration: bilateral                 Any lacerations were repaired in the usual fashion using 3-0 monocryl.    Excellent hemostasis was noted. Needle count correct. Infant and patient in delivery room in good and stable condition.        Labor Event Times    Labor onset date:  19 Onset time:   3:00 PM   Dilation complete date:  19 Complete time:   5:03 PM   Start pushing date/time:  2019 1800      Labor Length    1st Stage (hrs):  2 (min):  3   2nd Stage (hrs):  1 (min):  13   3rd Stage (hrs):  0 (min):  1      Labor Events    Labor Type:  Induction  Predominate monitoring during 1st stage:  continuous electronic fetal monitoring     Rupture date/time:  "19 1238   Rupture type:  Artificial Rupture of Membranes  Fluid color:  Clear     Induction:  Oxytocin, AROM  Induction date/time:     Cervical ripening date/time:        Additional Management:  TYPE 1 DM uncontrolled     Delivery/Placenta Date and Time    Delivery Date:  19 Delivery Time:   6:16 PM   Placenta Date/Time:  2019  6:18 PM  Oxytocin given at the time of delivery:  after delivery of placenta     Vaginal Counts     Initial count performed by 2 team members:   Two Team Members   Dr. Kai Sorto RN       [employer]   Initial counts 2  5    Added to count  1     Final counts 2 1 5    Placed during labor Accounted for at the end of labor   NA    NA    NA     Final count performed by 2 team members:   Two Team Members   Dr Kai Sorto RN      Final count correct?:  Yes     Apgars    Living status:  Living   1 Minute 5 Minute 10 Minute 15 Minute 20 Minute   Skin color: 1  1       Heart rate: 2  2       Reflex irritability: 2  2       Muscle tone: 2  2       Respiratory effort: 2  2       Total: 9  9       Apgars assigned by:  JACKY HASSAN     Cord    Vessels:  3 Vessels Complications:  Nuchal   Cord Blood Disposition:  Lab Gases Sent?:  No       Resuscitation    Methods:  None   Care at Delivery:  Called by Dr Kulkarni for the delivery of 36 weeks. Infant cried at perineum,time cord clamping done for 1 min. Brought the heated warmer, dried. Pink and vigorous with good tone.    JACKY Hassan 2019 6:31 PM     [place] Measurements    Weight:  7 lb 2.6 oz Length:  1' 7.5\"   Head circumference:  33.7 cm       Skin to Skin and Feeding Plan    Skin to skin initiation date/time: 1841    Skin to skin with:  Mother  Skin to skin end date/time:     How do you plan to feed your baby:  Breastfeeding     Labor Events and Shoulder Dystocia    Fetal Tracing Prior to Delivery:  Category 1  Shoulder dystocia present?:  " Neg     Delivery (Maternal) (Provider to Complete) ([id number])    Episiotomy:  None  Periurethral laceration:  bilateral Repaired?:  Yes      Blood Loss  Mother: Guerline Rock #[id number]   Start of Mother's Information    IO Blood Loss  02/08/19 1500 - 02/09/19 1816    Total QBL Blood Loss (mL) Hospital Encounter 488 mL    Total  488 mL         End of Mother's Information  Mother: Guerline Rock #[id number]         Delivery - Provider to Complete ([id number])    Delivering clinician:  Meghan Quinn DO  Delivery Type (Choose the 1 that will go to the Birth History):  Vaginal, Spontaneous   Other personnel:   Provider Role   Amarilis Sorto, FLAVIO Delivery Nurse   Anette Mueller RN Delivery Assist   Meghan Quinn DO          Placenta    Delayed Cord Clamping:  Done  Date/Time:  2/8/2019  6:18 PM  Removal:  Spontaneous  Disposition:  Pathology     Presentation and Position    Presentation:  Vertex  Position:  Left Occiput Anterior           Meghan Quinn DO

## 2019-02-23 ENCOUNTER — OFFICE VISIT (OUTPATIENT)
Dept: URGENT CARE | Facility: URGENT CARE | Age: 34
End: 2019-02-23
Payer: COMMERCIAL

## 2019-02-23 ENCOUNTER — NURSE TRIAGE (OUTPATIENT)
Dept: NURSING | Facility: CLINIC | Age: 34
End: 2019-02-23

## 2019-02-23 VITALS
DIASTOLIC BLOOD PRESSURE: 62 MMHG | SYSTOLIC BLOOD PRESSURE: 109 MMHG | TEMPERATURE: 98.4 F | OXYGEN SATURATION: 98 % | HEART RATE: 86 BPM

## 2019-02-23 DIAGNOSIS — E10.9 TYPE 1 DIABETES MELLITUS WITHOUT COMPLICATION (H): ICD-10-CM

## 2019-02-23 DIAGNOSIS — N61.0 ACUTE MASTITIS OF LEFT BREAST: Primary | ICD-10-CM

## 2019-02-23 PROCEDURE — 99214 OFFICE O/P EST MOD 30 MIN: CPT | Performed by: FAMILY MEDICINE

## 2019-02-23 RX ORDER — CEPHALEXIN 500 MG/1
500 CAPSULE ORAL 4 TIMES DAILY
Qty: 40 CAPSULE | Refills: 0 | Status: SHIPPED | OUTPATIENT
Start: 2019-02-23 | End: 2019-03-05

## 2019-02-23 NOTE — PATIENT INSTRUCTIONS
Patient Education     Cellulitis  Cellulitis is an infection of the deep layers of skin. A break in the skin, such as a cut or scratch, can let bacteria under the skin. If the bacteria get to deep layers of the skin, it can be serious. If not treated, cellulitis can get into the bloodstream and lymph nodes. The infection can then spread throughout the body. This causes serious illness.  Cellulitis causes the affected skin to become red, swollen, warm, and sore. The reddened areas have a visible border. An open sore may leak fluid (pus). You may have a fever, chills, and pain.  Cellulitis is treated with antibiotics taken for 7 to 10 days. An open sore may be cleaned and covered with cool wet gauze. Symptoms should get better 1 to 2 days after treatment is started. Make sure to take all the antibiotics for the full number of days until they are gone. Keep taking the medicine even if your symptoms go away.  Home care  Follow these tips:    Limit the use of the part of your body with cellulitis.     If the infection is on your leg, keep your leg raised while sitting. This will help to reduce swelling.    Take all of the antibiotic medicine exactly as directed until it is gone. Do not miss any doses, especially during the first 7 days. Don t stop taking the medicine when your symptoms get better.    Keep the affected area clean and dry.    Wash your hands with soap and warm water before and after touching your skin. Anyone else who touches your skin should also wash his or her hands. Don't share towels.  Follow-up care  Follow up with your healthcare provider, or as advised. If your infection does not go away on the first antibiotic, your healthcare provider will prescribe a different one.  When to seek medical advice  Call your healthcare provider right away if any of these occur:    Red areas that spread    Swelling or pain that gets worse    Fluid leaking from the skin (pus)    Fever higher of 100.4  F (38.0  C) or  higher after 2 days on antibiotics  Date Last Reviewed: 9/1/2016 2000-2018 [employer] [address] [place] [address], [place] [address]. All rights reserved. This information is not intended as a substitute for professional medical care. Always follow your healthcare professional's instructions.

## 2019-02-23 NOTE — TELEPHONE ENCOUNTER
"Patient states she called clinic yesterday requesting a prescription for an antibiotic for \"mastitis.\"  Patient is 2 weeks post partum.  Currently states has Left breast pain. Describes as an \"8\" on a 1-10 pain scale. Pain started 1 day ago.  States Left breast is red with \"red streaks\" noted yesterday.  Currently is trying to breast feed and pump with decreased output.  Has tried ibuprofen with slight relief only.  Temperature not checked.   Taking increased fluids.   History of mastitis with previous .    Protocol- Postpartum- Breast Pain & Engorgement- Adult   Care advice reviewed.   Disposition- See Physician within 4 hours   Caller states understanding of the recommended disposition.   Advised to be seen at [facility] within 4 hours.   Directions and hours given.   Advised to call back if further questions or concerns.     KAMINI GillisN RN  [facility]     Reason for Disposition    [1] Breast looks infected (spreading redness, feels hot to touch) AND [2] large red area (> 2 in. or 5 cm)    Additional Information    Negative: Sounds like a life-threatening emergency to the triager    Negative: [1] SEVERE breast pain AND [2] fever > 103 F (39.4 C)    Negative: Patient sounds very sick or weak to the triager    Protocols used: POSTPARTUM - BREAST PAIN AND ENGORGEMENT-ADULT-AH      "

## 2019-02-23 NOTE — PROGRESS NOTES
SUBJECTIVE:   Chief Complaint   Patient presents with     Urgent Care     Breast Problem     Possible mastitis on Lt breast x2 days, just had given birth x2 weeks     Guerline Rock is a 33 year old female who presents for evaluation of and area of redness, tenderness, swelling and warmth of the skin that developed on the  left, anterior and superior  breast.  Patient has had pain, red streaks, skin erythema (reddened skin) and tenderness for 2 days.    Precipitating event was breast feeding,    Therapies tried: warm packs, nursing, pumping the breast    Has type 1 diabetes-  Says usual preprandial/ fasting glucose is ,  But have been higher in the past day.    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      Current Outpatient Medications on File Prior to Visit:  insulin aspart (NOVOLOG FLEXPEN) 100 UNIT/ML pen Inject 4 Units Subcutaneous Take with snacks or supplements for high blood sugar   insulin glargine (LANTUS SOLOSTAR PEN) 100 UNIT/ML pen Inject 20 Units Subcutaneous At Bedtime   Prenatal Vit-Fe Fumarate-FA (PRENATAL MULTIVITAMIN PLUS IRON) 27-0.8 MG TABS per tablet Take 1 tablet by mouth daily   blood glucose monitoring (NO BRAND SPECIFIED) test strip Use to test blood sugars 6 times daily or as directed   guaiFENesin (MUCINEX) 600 MG 12 hr tablet Take 1 tablet (600 mg) by mouth 2 times daily as needed for congestion (Patient not taking: Reported on  11/19/2018)   insulin glargine (LANTUS SOLOSTAR PEN) 100 UNIT/ML pen Inject 15 Units Subcutaneous At Bedtime   insulin glargine (LANTUS SOLOSTAR PEN) 100 UNIT/ML pen Inject 10 Units Subcutaneous At Bedtime     No current facility-administered medications on file prior to visit.     Social History     Tobacco Use     Smoking status: Never Smoker     Smokeless tobacco: Never Used   Substance Use Topics     Alcohol use: No       Family History   Problem Relation Age of Onset     Family History Negative Mother      Family History Negative Father        ROS:  CONSTITUTIONAL:NEGATIVE for fever, chills,    EYES: NEGATIVE for vision changes or irritation  ENT/MOUTH: NEGATIVE for ear, mouth and throat problems  RESP:NEGATIVE for significant cough or SOB    OBJECTIVE:  /62 (BP Location: Right arm, Patient Position: Chair, Cuff Size: Adult Regular)   Pulse 86   Temp 98.4  F (36.9  C) (Oral)   LMP 05/29/2018 (LMP Unknown)   SpO2 98%     SKIN: area involved is 10 cm by 10 cm on the left,  anterior and superior breast.   The skin appear erythematous, moderately swollen, with tenderness and warmth to the touch.     GENERAL:  Alert, mild distress  EYES: EOMI,   conjunctiva clear  HENT: External ears with no swelling or lesions   Nose and lips without  Swelling, ulcers, erythema or lesions  NECK: normal pain free ROM  RESP: no labored respirations, no tachypnea  EXTREMITIES:   Full ROM without expression of pain or limitation x 4 extremities  NEURO: Normal strength and tone, ambulation without difficulty,   normal speech and mentation  PSYCH: mentation and affect appears normal and patient appearance--appropriately groomed       ASSESSMENT:  Acute mastitis of left breast     - cephALEXin (KEFLEX) 500 MG capsule; Take 1 capsule (500 mg) by mouth 4 times daily for 10 days    patient to monitor for signs or symptoms of worsening/ spreading infection.  Go to Urgent care or Emergency Department if worsening fevers/chills and/or  spreading infection.  Warm, moist packs or towels can be applied to the region to aid in resolution of the infection.  Use Tylenol or ibuprofen for pain or fever.    Discussed safety profile of keflex for infant who is breast feeding      Type 1 diabetes mellitus without complication (H)     We discussed that diabetes control can be more difficult  , the blood sugar levels go higher due to the infection    The body's ability to fight off infections is greatly impaired by high blood sugars, so the patient is advised to try to diligently monitor and treat the blood sugar to help  the body's healing    I recommend that the patient perform  frequent blood sugar tests with sliding scale insulin before meals or 3-4 times per day.  As the illness resolves the need for extra insulin will decrease.     If there are acute signs of worsening symptoms or weakness or lightheadedness then the patient should be re-evaluated at ,   or ER.

## 2019-03-06 ENCOUNTER — TRANSFERRED RECORDS (OUTPATIENT)
Dept: HEALTH INFORMATION MANAGEMENT | Facility: CLINIC | Age: 34
End: 2019-03-06

## 2019-03-06 LAB
GLUCOSE SERPL-MCNC: 235 MG/DL (ref 65–99)
HBA1C MFR BLD: 7.1 % (ref 4–6)

## 2019-04-09 ENCOUNTER — PRENATAL OFFICE VISIT (OUTPATIENT)
Dept: OBGYN | Facility: CLINIC | Age: 34
End: 2019-04-09
Payer: COMMERCIAL

## 2019-04-09 VITALS
WEIGHT: 169.7 LBS | BODY MASS INDEX: 26.63 KG/M2 | DIASTOLIC BLOOD PRESSURE: 62 MMHG | SYSTOLIC BLOOD PRESSURE: 118 MMHG | HEIGHT: 67 IN

## 2019-04-09 DIAGNOSIS — Z30.011 ENCOUNTER FOR INITIAL PRESCRIPTION OF CONTRACEPTIVE PILLS: Primary | ICD-10-CM

## 2019-04-09 PROBLEM — O09.90 HIGH-RISK PREGNANCY: Status: RESOLVED | Noted: 2018-08-27 | Resolved: 2019-04-09

## 2019-04-09 LAB — HGB BLD-MCNC: 12.9 G/DL (ref 11.7–15.7)

## 2019-04-09 PROCEDURE — 85018 HEMOGLOBIN: CPT | Performed by: FAMILY MEDICINE

## 2019-04-09 PROCEDURE — 36415 COLL VENOUS BLD VENIPUNCTURE: CPT | Performed by: FAMILY MEDICINE

## 2019-04-09 PROCEDURE — 99207 ZZC POST PARTUM EXAM: CPT | Performed by: FAMILY MEDICINE

## 2019-04-09 RX ORDER — ACETAMINOPHEN AND CODEINE PHOSPHATE 120; 12 MG/5ML; MG/5ML
0.35 SOLUTION ORAL DAILY
Qty: 90 TABLET | Refills: 3 | Status: SHIPPED | OUTPATIENT
Start: 2019-04-09 | End: 2020-03-04

## 2019-04-09 ASSESSMENT — PATIENT HEALTH QUESTIONNAIRE - PHQ9: SUM OF ALL RESPONSES TO PHQ QUESTIONS 1-9: 0

## 2019-04-09 ASSESSMENT — MIFFLIN-ST. JEOR: SCORE: 1517.38

## 2019-04-09 NOTE — PROGRESS NOTES
SUBJECTIVE: Guerline is here for a 6-week postpartum checkup.    Delivery date was 19. She had a  of a viable girl, weight 7 pounds 3 oz., with no complications.  Since delivery, she has been breast feeding-pumping and formula.  She has no signs of infection, bleeding or other complications.  She is not pregnant.  We discussed contraceptions and she has chosen mini pill / progesterone only pill.  She  has not had intercourse since delivery and complains of No discomfort. Patient screened for postpartum depression and complaints are NEGATIVE. Screening has also been completed for intimate partner violence.      - Pt is doing well, expresses no concerns at today's visit.         This document serves as a record of the services and decisions personally performed and made by Meghan Quinn DO. It was created on her behalf by Genet Ramesh, a trained medical scribe. The creation of this document is based the provider's statements to the medical scribe.  Scribe Genet Ramesh 3:29 PM, 2019    EXAM:  Today's Depression Rating was 0  PHQ-9 SCORE 2019   PHQ-9 Total Score 0     GENERAL healthy, alert and no distress  EYES EOMI, intact visual fields, PERRL and funduscopic deferred  HENT: Normocephalic. TM's grossly normal, oropharynx without significant findings.  NECK: no adenopathy, no asymmetry, masses, or scars and thyroid normal to palpation  RESP: Clear to auscultation  BREASTS: NEGATIVE  CV: NEGATIVE  LYMPH: NEGATIVE  GI: aorta normal and bowel sounds normal  : no hernia detected  GYN PELVIC: NEGATIVE, normal external genitalia, normal groin lymphatics, normal urethral meatus, normal vaginal mucosa, normal cervix and normal adnexa, no masses or tenderness  MS: NEGATIVE, Extremities normal. No deformaties, edema or skin discoloration.  SKIN: no ulcers, lesions or rash  NEURO: alert/oriented to person, location and time, CN 2-12 intact, brisk, symmetric reflexes at knees and biceps b/l, strength 5/5  throughout and symmetric, sensation to light touch grossly intact throughout  PSYCH: NEGATIVE    Discussed risks and benefits of contraception options in detail including oral contraceptive pills, injection, IUD, ring and sterilization. Patient elects progesterone-only pill.  No contraindications noted.           ASSESSMENT:   Normal postpartum exam after .    PLAN:  1. Hemoglobin pending  2. PPBC: Progesterone-only pill -- Micronor rx given  3. Return to clinic as needed or at time of next expected pap, pelvic, or breast exam.      Rx:  - Micronor 0.35 mg 1 tab po daily        The information in this document, created by the medical scribe for me, accurately reflects the services I personally performed and the decisions made by me. I have reviewed and approved this document for accuracy prior to leaving the patient care area.  3:29 PM, 19    Dr. Meghan Quinn, DO    OB/GYN   [facility]

## 2019-04-09 NOTE — NURSING NOTE
"Chief Complaint   Patient presents with     Post Partum Exam       Initial /62 (BP Location: Right arm, Patient Position: Sitting, Cuff Size: Adult Regular)   Ht 1.702 m (5' 7\")   Wt 77 kg (169 lb 11.2 oz)   LMP 2018 (LMP Unknown)   BMI 26.58 kg/m   Estimated body mass index is 26.58 kg/m  as calculated from the following:    Height as of this encounter: 1.702 m (5' 7\").    Weight as of this encounter: 77 kg (169 lb 11.2 oz).  BP completed using cuff size: regular    Questioned patient about current smoking habits.  Pt. has never smoked.          The following HM Due: NONE    "

## 2019-04-09 NOTE — PATIENT INSTRUCTIONS
Check lab today     Return yearly       Dr. Meghan Quinn, DO    Obstetrics and Gynecology  [facility] - [place] and [place]

## 2019-07-15 NOTE — TELEPHONE ENCOUNTER
Anesthesia Pre-Procedure Evaluation    Patient: Shahana Donaldson   MRN:     [id number] Gender:   female   Age:    81 year old :      1938        Preoperative Diagnosis: Mature Cataracts   Procedure(s):  Right Eye Phacoemulsification with Intraocular Lens, Dexamethasone     Past Medical History:   Diagnosis Date     Diverticulosis of colon (without mention of hemorrhage)      Essential hypertension, benign      Gastro-oesophageal reflux disease     nissen     Hemorrhoid      Nonsenile cataract      Osteoporosis      Other and unspecified hyperlipidemia      Rectocele      Symptomatic menopausal or female climacteric states       Past Surgical History:   Procedure Laterality Date     ARTHROPLASTY HIP Left 10/23/2018    Procedure: LEFT HIP COLEMAN- ARTHROPLASTY ;  Surgeon: Araceli Gutierrez MD;  Location: [facility] OR     C NONSPECIFIC PROCEDURE      Bilateral Tubal Ligation     C NONSPECIFIC PROCEDURE      D&C secondary to menorrhagia     C NONSPECIFIC PROCEDURE      child birth x 2     COLONOSCOPY  2011    Procedure:COLONOSCOPY; Surgeon:HALINA SCOTT; Location:[facility]     COLONOSCOPY Left 11/10/2015    Procedure: COLONOSCOPY;  Surgeon: Raheem Colunga MD;  Location: [facility]     GYN SURGERY      hysterectomy/oopherectomy; done for prolapse     LAPAROSCOPIC NISSEN FUNDOPLICATION  2013    Procedure: LAPAROSCOPIC NISSEN FUNDOPLICATION;  Laparoscopic Repair of Hiatel Hernia, NISSEN, Esophagoscopy, Gastroscopy And Duodenoscopy ;  Surgeon: Leonidas Valle MD;  Location: [facility] OR          Anesthesia Evaluation     . Pt has had prior anesthetic. Type: General    No history of anesthetic complications          ROS/MED HX    ENT/Pulmonary:  - neg pulmonary ROS     Neurologic:  - neg neurologic ROS     Cardiovascular:     (+) hypertension----. : . . . :. .       METS/Exercise Tolerance:  4 - Raking leaves, gardening   Hematologic:  - neg hematologic  ROS       Musculoskeletal:  - neg musculoskeletal  Pt is pregnant.  Spoke with pt and she is at the pharmacy now and will let them know  Adama Rojas RN, BSN     "ROS       GI/Hepatic:     (+) GERD       Renal/Genitourinary:  - ROS Renal section negative       Endo:  - neg endo ROS       Psychiatric:  - neg psychiatric ROS       Infectious Disease:  - neg infectious disease ROS       Malignancy:         Other:                         PHYSICAL EXAM:   Mental Status/Neuro: A/A/O   Airway: Facies: Feasible  Mallampati: I  Mouth/Opening: Full  TM distance: > 6 cm  Neck ROM: Full   Respiratory: Auscultation: CTAB     Resp. Rate: Normal     Resp. Effort: Normal      CV: Rhythm: Regular  Rate: Age appropriate  Heart: Normal Sounds   Comments:      Dental: Normal                  Lab Results   Component Value Date    WBC 4.2 07/03/2019    HGB 13.5 07/03/2019    HCT 42.4 07/03/2019     07/03/2019    CRP <2.9 04/17/2018    SED 14 04/17/2018     07/03/2019    POTASSIUM 3.8 07/03/2019    CHLORIDE 106 07/03/2019    CO2 29 07/03/2019    BUN 23 07/03/2019    CR 0.77 07/03/2019    GLC 83 07/03/2019    MARCK 8.8 07/03/2019    PHOS 3.1 10/23/2018    MAG 2.0 10/23/2018    ALBUMIN 3.9 07/03/2019    PROTTOTAL 7.6 07/03/2019    ALT 18 07/03/2019    AST 21 07/03/2019    ALKPHOS 97 07/03/2019    BILITOTAL 0.6 07/03/2019    INR 0.98 10/22/2018    TSH 2.51 04/17/2018       Preop Vitals  BP Readings from Last 3 Encounters:   07/03/19 170/89   04/25/19 161/88   02/08/19 145/81    Pulse Readings from Last 3 Encounters:   07/03/19 60   04/25/19 70   02/08/19 77      Resp Readings from Last 3 Encounters:   04/25/19 16   11/21/18 12   10/26/18 18    SpO2 Readings from Last 3 Encounters:   07/03/19 95%   04/25/19 95%   10/26/18 94%      Temp Readings from Last 1 Encounters:   04/25/19 36.7  C (98.1  F) (Oral)    Ht Readings from Last 1 Encounters:   07/03/19 1.728 m (5' 8.03\")      Wt Readings from Last 1 Encounters:   07/03/19 70.1 kg (154 lb 8 oz)    Estimated body mass index is 23.47 kg/m  as calculated from the following:    Height as of 7/3/19: 1.728 m (5' 8.03\").    Weight as of 7/3/19: 70.1 " kg (154 lb 8 oz).     LDA:            Assessment:   ASA SCORE: 2       Documentation: H&P complete; Preop Testing complete; Consents complete   Proceeding: Proceed without further delay  Tobacco Use:  NO Active use of Tobacco/UNKNOWN Tobacco use status     Plan:   Anes. Type:  MAC   Pre-Induction: Midazolam IV   Induction:  Not applicable   Airway: Native Airway   Access/Monitoring: PIV   Maintenance: N/a   Emergence: N/a   Logistics: Same Day Surgery     Postop Pain/Sedation Strategy:  Standard-Options: Opioids PRN     PONV Management:  Adult Risk Factors: Female, Non-Smoker, Postop Opioids  Prevention: Ondansetron; Dexamethasone     CONSENT: Direct conversation   Plan and risks discussed with: Patient                            Cam Cortez MD, MD

## 2019-08-29 ENCOUNTER — OFFICE VISIT (OUTPATIENT)
Dept: OBGYN | Facility: CLINIC | Age: 34
End: 2019-08-29
Payer: COMMERCIAL

## 2019-08-29 VITALS — DIASTOLIC BLOOD PRESSURE: 68 MMHG | BODY MASS INDEX: 25.22 KG/M2 | WEIGHT: 161 LBS | SYSTOLIC BLOOD PRESSURE: 104 MMHG

## 2019-08-29 DIAGNOSIS — Z30.013 ENCOUNTER FOR INITIAL PRESCRIPTION OF INJECTABLE CONTRACEPTIVE: Primary | ICD-10-CM

## 2019-08-29 LAB — HCG UR QL: NEGATIVE

## 2019-08-29 PROCEDURE — 81025 URINE PREGNANCY TEST: CPT | Performed by: ADVANCED PRACTICE MIDWIFE

## 2019-08-29 PROCEDURE — 96372 THER/PROPH/DIAG INJ SC/IM: CPT | Performed by: ADVANCED PRACTICE MIDWIFE

## 2019-08-29 PROCEDURE — 99213 OFFICE O/P EST LOW 20 MIN: CPT | Mod: 25 | Performed by: ADVANCED PRACTICE MIDWIFE

## 2019-08-29 RX ORDER — MEDROXYPROGESTERONE ACETATE 150 MG/ML
150 INJECTION, SUSPENSION INTRAMUSCULAR
Status: COMPLETED | OUTPATIENT
Start: 2019-08-29 | End: 2020-06-25

## 2019-08-29 RX ADMIN — MEDROXYPROGESTERONE ACETATE 150 MG: 150 INJECTION, SUSPENSION INTRAMUSCULAR at 12:05

## 2019-08-29 NOTE — NURSING NOTE
"Chief Complaint   Patient presents with     Contraception     consult       Initial /68 (BP Location: Right arm, Cuff Size: Adult Regular)   Wt 73 kg (161 lb)   LMP 08/15/2019 (Approximate)   Breastfeeding? No   BMI 25.22 kg/m   Estimated body mass index is 25.22 kg/m  as calculated from the following:    Height as of 19: 1.702 m (5' 7\").    Weight as of this encounter: 73 kg (161 lb).  BP completed using cuff size: regular    Questioned patient about current smoking habits.  Pt. has never smoked.          The following HM Due: NONE  Adia Smart CMA             "

## 2019-08-29 NOTE — NURSING NOTE
Clinic Administered Medication Documentation    MEDICATION LIST:   Depo Provera Documentation    Prior to injection, verified patient identity using patient's name and date of birth. Medication was administered. Please see MAR and medication order for additional information. Patient instructed to remain in clinic for 15 minutes.    BP: 104/68    LAST PAP/EXAM:   Lab Results   Component Value Date    PAP NIL 08/27/2018     URINE HCG:negative    NEXT INJECTION DUE: 11/14/19 - 11/28/19    Was entire vial of medication used? Yes  Vial/Syringe: Single dose vial  Expiration Date:  06/20  Adia Smart CMA

## 2019-08-29 NOTE — PROGRESS NOTES
SUBJECTIVE:   Guerline Rock is a 34 year old who presents to the clinic for discussion of birth control methods.   She has used the following methods in the past: ARUN  Today she is interested in discussing Depo Provera  Histories reviewed and updated  Past Medical History:   Diagnosis Date     Diabetes mellitus type 1 (H) 1/16/2014     Past Surgical History:   Procedure Laterality Date     DISCECTOMY LUMBAR MINIMALLY INVASIVE ONE LEVEL  2010, 2001    L4-L5     EYE SURGERY       FOOT SURGERY       Social History     Socioeconomic History     Marital status:      Spouse name: Not on file     Number of children: Not on file     Years of education: Not on file     Highest education level: Not on file   Occupational History     Not on file   Social Needs     Financial resource strain: Not on file     Food insecurity:     Worry: Not on file     Inability: Not on file     Transportation needs:     Medical: Not on file     Non-medical: Not on file   Tobacco Use     Smoking status: Never Smoker     Smokeless tobacco: Never Used   Substance and Sexual Activity     Alcohol use: No     Drug use: No     Sexual activity: Yes     Partners: Male   Lifestyle     Physical activity:     Days per week: Not on file     Minutes per session: Not on file     Stress: Not on file   Relationships     Social connections:     Talks on phone: Not on file     Gets together: Not on file     Attends Restorationism service: Not on file     Active member of club or organization: Not on file     Attends meetings of clubs or organizations: Not on file     Relationship status: Not on file     Intimate partner violence:     Fear of current or ex partner: Not on file     Emotionally abused: Not on file     Physically abused: Not on file     Forced sexual activity: Not on file   Other Topics Concern     Parent/sibling w/ CABG, MI or angioplasty before 65F 55M? No   Social History Narrative     Not on file     Family History   Problem Relation Age of Onset      Family History Negative Mother      Family History Negative Father        Menstrual History:  Menses every 28 days.  Length of menses: 5 days  Menstrual description: normal    Denies the following contraindications to estrogen/progesterone combined contraception:  Migraine with aura  Smoking over age 35  Liver disease  Personal history of blood clot or stroke   History of heart disease  History of breast cancer  Undiagnosed vaginal bleeding  Hypertension  Pregnancy    Health maintenance updated:  yes    ROS:   12 point review of systems negative other than symptoms noted below.    EXAM:  /68 (BP Location: Right arm, Cuff Size: Adult Regular)   Wt 73 kg (161 lb)   LMP 08/15/2019 (Approximate)   Breastfeeding? No   BMI 25.22 kg/m    Body mass index is 25.22 kg/m .    ASSESSMENT/PLAN:    ICD-10-CM    1. Encounter for initial prescription of injectable contraceptive Z30.013 HCG Qual, Urine - CSC,  Range, [place]ph[place]  ([id number])     medroxyPROGESTERone (DEPO-PROVERA) injection 150 mg     INJECTION INTRAMUSCULAR OR SUB-Q     There are no contraindications to the use of Depo Provera    COUNSELING:  Reviewed risks and benefits of contraceptive use depo provera. UPT in clinic negative first shot given. Return to office in 3 months for next shot.   Discussed proper use of chosen method  Handouts/Instrucions provided    May Shields CNM

## 2019-11-20 ENCOUNTER — ALLIED HEALTH/NURSE VISIT (OUTPATIENT)
Dept: NURSING | Facility: CLINIC | Age: 34
End: 2019-11-20
Payer: COMMERCIAL

## 2019-11-20 DIAGNOSIS — Z30.9 CONTRACEPTIVE MANAGEMENT: Primary | ICD-10-CM

## 2019-11-20 PROCEDURE — 96372 THER/PROPH/DIAG INJ SC/IM: CPT

## 2019-11-20 RX ADMIN — MEDROXYPROGESTERONE ACETATE 150 MG: 150 INJECTION, SUSPENSION INTRAMUSCULAR at 10:24

## 2019-11-20 NOTE — PROGRESS NOTES
Clinic Administered Medication Documentation    MEDICATION LIST:   Depo Provera Documentation    Prior to injection, verified patient identity using patient's name and date of birth. Medication was administered. Please see MAR and medication order for additional information. Patient instructed to return in window given.    BP: Data Unavailable    LAST PAP/EXAM:   Lab Results   Component Value Date    PAP NIL 08/27/2018     URINE HCG:not indicated    NEXT INJECTION DUE: 2/5/20 - 2/19/20  Left glute  Was entire vial of medication used? Yes  Vial/Syringe: Single dose vial  Expiration Date:  0720    Arya Disla CMA

## 2019-12-04 ENCOUNTER — TRANSFERRED RECORDS (OUTPATIENT)
Dept: HEALTH INFORMATION MANAGEMENT | Facility: CLINIC | Age: 34
End: 2019-12-04

## 2019-12-04 LAB — RETINOPATHY: NEGATIVE

## 2020-01-06 ENCOUNTER — E-VISIT (OUTPATIENT)
Dept: OBGYN | Facility: CLINIC | Age: 35
End: 2020-01-06
Payer: COMMERCIAL

## 2020-01-06 DIAGNOSIS — J32.9 SINUSITIS, UNSPECIFIED CHRONICITY, UNSPECIFIED LOCATION: Primary | ICD-10-CM

## 2020-01-06 PROCEDURE — 99421 OL DIG E/M SVC 5-10 MIN: CPT | Performed by: FAMILY MEDICINE

## 2020-01-07 ENCOUNTER — E-VISIT (OUTPATIENT)
Dept: FAMILY MEDICINE | Facility: CLINIC | Age: 35
End: 2020-01-07
Payer: COMMERCIAL

## 2020-01-07 DIAGNOSIS — J01.00 ACUTE NON-RECURRENT MAXILLARY SINUSITIS: Primary | ICD-10-CM

## 2020-01-07 PROCEDURE — 99421 OL DIG E/M SVC 5-10 MIN: CPT | Performed by: FAMILY MEDICINE

## 2020-01-07 RX ORDER — FLUCONAZOLE 150 MG/1
150 TABLET ORAL ONCE
Qty: 1 TABLET | Refills: 0 | Status: SHIPPED | OUTPATIENT
Start: 2020-01-07 | End: 2020-03-04

## 2020-01-07 RX ORDER — AMOXICILLIN 875 MG
875 TABLET ORAL 2 TIMES DAILY
Qty: 20 TABLET | Refills: 0 | Status: SHIPPED | OUTPATIENT
Start: 2020-01-07 | End: 2020-03-04

## 2020-01-07 NOTE — TELEPHONE ENCOUNTER
Provider E-Visit time total (minutes): 5 minutes     Dr. Meghan Quinn,     Obstetrics and Gynecology  [facility] - [place] and [place]

## 2020-03-02 ENCOUNTER — HEALTH MAINTENANCE LETTER (OUTPATIENT)
Age: 35
End: 2020-03-02

## 2020-03-04 ENCOUNTER — OFFICE VISIT (OUTPATIENT)
Dept: FAMILY MEDICINE | Facility: CLINIC | Age: 35
End: 2020-03-04
Payer: COMMERCIAL

## 2020-03-04 ENCOUNTER — NURSE TRIAGE (OUTPATIENT)
Dept: OBGYN | Facility: CLINIC | Age: 35
End: 2020-03-04

## 2020-03-04 ENCOUNTER — TELEPHONE (OUTPATIENT)
Dept: FAMILY MEDICINE | Facility: CLINIC | Age: 35
End: 2020-03-04

## 2020-03-04 VITALS
WEIGHT: 156 LBS | TEMPERATURE: 99.3 F | DIASTOLIC BLOOD PRESSURE: 64 MMHG | SYSTOLIC BLOOD PRESSURE: 119 MMHG | BODY MASS INDEX: 24.43 KG/M2 | OXYGEN SATURATION: 98 % | HEART RATE: 98 BPM

## 2020-03-04 DIAGNOSIS — J10.1 INFLUENZA A: Primary | ICD-10-CM

## 2020-03-04 LAB
FLUAV+FLUBV AG SPEC QL: NEGATIVE
FLUAV+FLUBV AG SPEC QL: POSITIVE
SPECIMEN SOURCE: ABNORMAL

## 2020-03-04 PROCEDURE — 87804 INFLUENZA ASSAY W/OPTIC: CPT | Performed by: PHYSICIAN ASSISTANT

## 2020-03-04 PROCEDURE — 99213 OFFICE O/P EST LOW 20 MIN: CPT | Performed by: PHYSICIAN ASSISTANT

## 2020-03-04 RX ORDER — ONDANSETRON 4 MG/1
4 TABLET, ORALLY DISINTEGRATING ORAL EVERY 8 HOURS PRN
Qty: 10 TABLET | Refills: 0 | Status: SHIPPED | OUTPATIENT
Start: 2020-03-04 | End: 2021-12-03

## 2020-03-04 RX ORDER — OSELTAMIVIR PHOSPHATE 75 MG/1
75 CAPSULE ORAL 2 TIMES DAILY
Qty: 10 CAPSULE | Refills: 0 | Status: SHIPPED | OUTPATIENT
Start: 2020-03-04 | End: 2020-03-09

## 2020-03-04 NOTE — TELEPHONE ENCOUNTER
"    Additional Information    Negative: Severe difficulty breathing (e.g., struggling for each breath, speaks in single words)    Negative: Bluish (or gray) lips or face    Negative: Shock suspected (e.g., cold/pale/clammy skin, too weak to stand, low BP, rapid pulse)    Negative: Sounds like a life-threatening emergency to the triager    Negative: Sounds like a cold and there is no fever    Negative: Cough and there is no fever    Negative: Severe cough    Negative: Throat pain and there is no fever    Negative: Severe sore throat    Negative: Influenza vaccine reaction is suspected    Negative: Headache and stiff neck (can't touch chin to chest)    Negative: Chest pain (EXCEPTION: MILD central chest pain, present only when coughing)    Negative: Difficulty breathing that is not severe and not relieved by cleaning out the nose    Negative: Patient sounds very sick or weak to the triager    Negative: Fever > 104 F (40 C)    Negative: Fever > 101 F (38.3 C) and over 60 years of age    Negative: Fever > 100.0 F (37.8 C) and diabetes mellitus or weak immune system (e.g., HIV positive, cancer chemo, splenectomy, organ transplant, chronic steroids)    Negative: Fever > 100.0 F (37.8 C) and bedridden (e.g., nursing home patient, stroke, chronic illness, recovering from surgery)    HIGH RISK (e.g., age > 64 years, pregnant, HIV+, chronic medical condition) and flu symptoms    Answer Assessment - Initial Assessment Questions  1. WORST SYMPTOM: \"What is your worst symptom?\" (e.g., cough, runny nose, muscle aches, headache, sore throat, fever)       Body aches and \"hard to take deep breath\"  2. ONSET: \"When did your flu symptoms start?\"       Yesterday   3. COUGH: \"How bad is the cough?\"       Not too bad   4. RESPIRATORY DISTRESS: \"Describe your breathing.\"       Pt feels like she can not get deep breath \"it feels funny\"  5. FEVER: \"Do you have a fever?\" If so, ask: \"What is your temperature, how was it measured, and when " "did it start?\"      Not checked - but had chills all day yesterday   6. EXPOSURE: \"Were you exposed to someone with influenza?\"        Unknown , son was ill with the same symptoms by they did not test him  7. FLU VACCINE: \"Did you get a flu shot this year?\"      Yes   8. HIGH RISK DISEASE: \"Do you any chronic medical problems?\" (e.g., heart or lung disease, asthma, weak immune system, or other HIGH RISK conditions)      Yes pt is type 1 DM   9. PREGNANCY: \"Is there any chance you are pregnant?\" \"When was your last menstrual period?\"      No   10. OTHER SYMPTOMS: \"Do you have any other symptoms?\"  (e.g., runny nose, muscle aches, headache, sore throat)        Sore throat, body aches and chest tightness    Protocols used: INFLUENZA - SEASONAL-A-OH      "

## 2020-03-04 NOTE — PROGRESS NOTES
Subjective     Guerline Rock is a 34 year old female who presents to clinic today for the following health issues:    HPI   Acute Illness   Acute illness concerns: flu symptoms   Onset: 03/01/2020- worsened yesterday    Fever: YES- 101.5 F    Chills/Sweats: YES    Headache (location?): YES    Sinus Pressure:YES    Conjunctivitis:  no    Ear Pain: no    Rhinorrhea: YES    Congestion: YES    Sore Throat: YES     Cough: YES-non-productive    Wheeze: no     Decreased Appetite: YES    Nausea: no     Vomiting: no     Diarrhea:  no     Dysuria/Freq.: no     Fatigue/Achiness: YES    Sick/Strep Exposure: YES- Son was believed to have influenza.     Therapies Tried and outcome: throat spray, mucinex, tylenol with no relief     Current Outpatient Medications   Medication     blood glucose monitoring (NO BRAND SPECIFIED) test strip     insulin aspart (NOVOLOG FLEXPEN) 100 UNIT/ML pen     insulin glargine (LANTUS SOLOSTAR PEN) 100 UNIT/ML pen     insulin glargine (LANTUS SOLOSTAR PEN) 100 UNIT/ML pen     insulin glargine (LANTUS SOLOSTAR PEN) 100 UNIT/ML pen     Current Facility-Administered Medications   Medication     medroxyPROGESTERone (DEPO-PROVERA) injection 150 mg       No Known Allergies    Reviewed and updated as needed this visit by Provider  Allergies  Meds  Problems         Review of Systems   GENERAL:  As noted in HPI  RESP:  As noted in HPI        Objective    /64 (BP Location: Right arm, Patient Position: Chair, Cuff Size: Adult Regular)   Pulse 98   Temp 99.3  F (37.4  C) (Oral)   Wt 70.8 kg (156 lb)   SpO2 98%   BMI 24.43 kg/m    Body mass index is 24.43 kg/m .  Physical Exam   GENERAL: No acute distress, appears ill  HEENT: Normocephalic, PERRL, Canals patent, bilateral TM's non-erythematous and non-bulging. Turbinates normal in appearance bilaterally. Posterior oropharynx non-erythematous and without exudate.  NECK: No cervical or supraclavicular lymphadenopathy.  CARDIAC: Regular rate and rhythm.  No murmurs.  PULMONARY: Lungs are clear to auscultation bilaterally. No wheezes, rhonchi or crackles.  NEURO: Alert and non-focal      Diagnostic Test Results:  Results for orders placed or performed in visit on 03/04/20 (from the past 24 hour(s))   Influenza A/B antigen   Result Value Ref Range    Influenza A/B Agn Specimen Nasal     Influenza A Negative NEG^Negative    Influenza B Positive (A) NEG^Negative           Assessment & Plan     1. Influenza A  Patient did test positive for influenza A.  Due to her being high risk with the type 1 diabetes I did opt to treat her.  I cautioned her that the Tamiflu can cause nausea and vomiting.  She has a 1-year-old at home and I encouraged her to call the 1-year-old's pediatrician to start preventative medications.  Otherwise continue with at home/symptomatic remedies as needed.  - Influenza A/B antigen  - oseltamivir (TAMIFLU) 75 MG capsule; Take 1 capsule (75 mg) by mouth 2 times daily for 5 days  Dispense: 10 capsule; Refill: 0  - ondansetron (ZOFRAN-ODT) 4 MG ODT tab; Take 1 tablet (4 mg) by mouth every 8 hours as needed for nausea or vomiting  Dispense: 10 tablet; Refill: 0    Roshni Durham PA-C  [facility]

## 2020-03-04 NOTE — TELEPHONE ENCOUNTER
Reason for Call:  Other Symptoms     Detailed comments: patients  called in regarding symptoms wife is having    -sore throat  -body aches  -cannot get out of bed    Please call them back to address     Phone Number Patient can be reached at: Cell number on file:    Telephone Information:   Mobile [phone]       Best Time: any     Can we leave a detailed message on this number? YES    Call taken on 3/4/2020 at 8:53 AM by Hien Sandy

## 2020-03-13 ENCOUNTER — TRANSFERRED RECORDS (OUTPATIENT)
Dept: HEALTH INFORMATION MANAGEMENT | Facility: CLINIC | Age: 35
End: 2020-03-13

## 2020-03-13 LAB
ALBUMIN (URINE) MG/SPEC: 1 MG/DL
ALBUMIN/CREATININE RATIO: 11 MCG/MG CREAT
CREATININE (URINE): 90 MG/DL (ref 20–275)
GLUCOSE SERPL-MCNC: 120 MG/DL (ref 65–99)
HBA1C MFR BLD: 7.5 % (ref 4–6)

## 2020-03-27 ENCOUNTER — ALLIED HEALTH/NURSE VISIT (OUTPATIENT)
Dept: NURSING | Facility: CLINIC | Age: 35
End: 2020-03-27
Payer: COMMERCIAL

## 2020-03-27 DIAGNOSIS — Z30.42 DEPO-PROVERA CONTRACEPTIVE STATUS: Primary | ICD-10-CM

## 2020-03-27 LAB — HCG UR QL: NEGATIVE

## 2020-03-27 PROCEDURE — 96372 THER/PROPH/DIAG INJ SC/IM: CPT

## 2020-03-27 PROCEDURE — 81025 URINE PREGNANCY TEST: CPT | Performed by: FAMILY MEDICINE

## 2020-03-27 RX ADMIN — MEDROXYPROGESTERONE ACETATE 150 MG: 150 INJECTION, SUSPENSION INTRAMUSCULAR at 16:15

## 2020-03-27 NOTE — PROGRESS NOTES
Clinic Administered Medication Documentation      Depo Provera Documentation    URINE HCG: negative    Depo-Provera Standing Order inclusion/exclusion criteria reviewed.   Patient meets: inclusion criteria     BP: Data Unavailable  LAST PAP/EXAM:   Lab Results   Component Value Date    PAP NIL 08/27/2018       Prior to injection, verified patient identity using patient's name and date of birth. Medication was administered. Please see MAR and medication order for additional information.     Was entire vial of medication used? Yes  Vial/Syringe: Single dose vial  Expiration Date:  10/2020    Patient instructed to remain in clinic for 15 minutes.  NEXT INJECTION DUE: 6/12/20 - 6/26/20      NASRA Escobedo

## 2020-04-07 ENCOUNTER — MYC MEDICAL ADVICE (OUTPATIENT)
Dept: FAMILY MEDICINE | Facility: CLINIC | Age: 35
End: 2020-04-07

## 2020-06-25 ENCOUNTER — ALLIED HEALTH/NURSE VISIT (OUTPATIENT)
Dept: NURSING | Facility: CLINIC | Age: 35
End: 2020-06-25
Payer: COMMERCIAL

## 2020-06-25 DIAGNOSIS — Z30.42 DEPO-PROVERA CONTRACEPTIVE STATUS: Primary | ICD-10-CM

## 2020-06-25 PROCEDURE — 96372 THER/PROPH/DIAG INJ SC/IM: CPT

## 2020-06-25 RX ADMIN — MEDROXYPROGESTERONE ACETATE 150 MG: 150 INJECTION, SUSPENSION INTRAMUSCULAR at 11:12

## 2020-06-25 NOTE — PROGRESS NOTES
Clinic Administered Medication Documentation      Depo Provera Documentation    URINE HCG: not indicated    Depo-Provera Standing Order inclusion/exclusion criteria reviewed.   Patient meets: inclusion criteria     BP: Data Unavailable  LAST PAP/EXAM:   Lab Results   Component Value Date    PAP NIL 08/27/2018       Prior to injection, verified patient identity using patient's name and date of birth. Medication was administered. Please see MAR and medication order for additional information.     Was entire vial of medication used? Yes  Vial/Syringe: Single dose vial  Expiration Date:  01/2021    Patient instructed to remain in clinic for 15 minutes.  NEXT INJECTION DUE: 9/10/20 - 9/24/20      Pt was notified that she needs to schedule an physical before here next Depo due.    NASRA Escobedo

## 2020-09-25 ENCOUNTER — OFFICE VISIT (OUTPATIENT)
Dept: OBGYN | Facility: CLINIC | Age: 35
End: 2020-09-25
Payer: COMMERCIAL

## 2020-09-25 VITALS — BODY MASS INDEX: 25.51 KG/M2 | SYSTOLIC BLOOD PRESSURE: 108 MMHG | DIASTOLIC BLOOD PRESSURE: 72 MMHG | WEIGHT: 162.9 LBS

## 2020-09-25 DIAGNOSIS — E10.9 TYPE 1 DIABETES MELLITUS WITHOUT COMPLICATION (H): ICD-10-CM

## 2020-09-25 DIAGNOSIS — Z23 NEED FOR PROPHYLACTIC VACCINATION AND INOCULATION AGAINST INFLUENZA: ICD-10-CM

## 2020-09-25 DIAGNOSIS — N64.59 NIPPLE SYMPTOM OR SIGN IN FEMALE: ICD-10-CM

## 2020-09-25 DIAGNOSIS — Z30.9 CONTRACEPTIVE MANAGEMENT: ICD-10-CM

## 2020-09-25 DIAGNOSIS — Z00.00 ANNUAL PHYSICAL EXAM: Primary | ICD-10-CM

## 2020-09-25 PROCEDURE — 96372 THER/PROPH/DIAG INJ SC/IM: CPT | Performed by: ADVANCED PRACTICE MIDWIFE

## 2020-09-25 PROCEDURE — 90686 IIV4 VACC NO PRSV 0.5 ML IM: CPT | Performed by: ADVANCED PRACTICE MIDWIFE

## 2020-09-25 PROCEDURE — 99395 PREV VISIT EST AGE 18-39: CPT | Mod: 25 | Performed by: ADVANCED PRACTICE MIDWIFE

## 2020-09-25 PROCEDURE — 90471 IMMUNIZATION ADMIN: CPT | Performed by: ADVANCED PRACTICE MIDWIFE

## 2020-09-25 RX ORDER — MEDROXYPROGESTERONE ACETATE 150 MG/ML
150 INJECTION, SUSPENSION INTRAMUSCULAR
Status: ACTIVE | OUTPATIENT
Start: 2020-09-25 | End: 2021-09-20

## 2020-09-25 RX ADMIN — MEDROXYPROGESTERONE ACETATE 150 MG: 150 INJECTION, SUSPENSION INTRAMUSCULAR at 15:23

## 2020-09-25 NOTE — NURSING NOTE
"Chief Complaint   Patient presents with     Physical     Pap 2018  NIL   Depo due 1 date late        Initial /72 (BP Location: Left arm, Patient Position: Chair, Cuff Size: Adult Regular)   Wt 73.9 kg (162 lb 14.4 oz)   LMP 2020 (Exact Date)   Breastfeeding No   BMI 25.51 kg/m   Estimated body mass index is 25.51 kg/m  as calculated from the following:    Height as of 19: 1.702 m (5' 7\").    Weight as of this encounter: 73.9 kg (162 lb 14.4 oz).  BP completed using cuff size: regular    Questioned patient about current smoking habits.  Pt. has never smoked.          The following HM Due: NONE      Clinic Administered Medication Documentation      Depo Provera Documentation    URINE HCG: not indicated    Depo-Provera Standing Order inclusion/exclusion criteria reviewed.   Patient meets: inclusion criteria     BP: 108/72  LAST PAP/EXAM:   Lab Results   Component Value Date    PAP NIL 2018       Prior to injection, verified patient identity using patient's name and date of birth. Medication was administered. Please see MAR and medication order for additional information.     Was entire vial of medication used? Yes  Vial/Syringe: Single dose vial  Expiration Date:  2021    Patient instructed to remain in clinic for 15 minutes.  NEXT INJECTION DUE: 20 - 20  GIVEN RUG       Margie Harvey CMA on 2020 at 1:11 PM     "

## 2020-09-25 NOTE — PROGRESS NOTES
"Guerline is a 35 year old  female who presents for annual exam.     Besides routine health maintenance, she reports that her right nipple has changed slightly in appearance since she stopped breastfeeding her baby. She states that she had a difficulty with latching/breastfeeding. She denies pain, but sometimes notices a \"scab\" that is blood if picked off. Denies any s/s infection.   HPI:    Menses are rare d/t depo    Patient's last menstrual period was 2020 (exact date)..   Using depo or other injectable for contraception.    She is not currently considering pregnancy.    REPRODUCTIVE/GYNECOLOGIC HISTORY:  Guerline is sexually active with male partner(s) and is currently in monogamous relationship.   STI testing offered?  Declined  History of abnormal Pap smear:  No  SOCIAL HISTORY  Abuse: does not report having previously been physical or sexually abused.    Do you feel safe in your environment? YES  Does not smoke    She  reports that she has never smoked. She has never used smokeless tobacco.      Last PHQ-9 score on record =   PHQ-9 SCORE 2019   PHQ-9 Total Score 0     Last GAD7 score on record =   [place]-7 SCORE 2016   Total Score 0     Alcohol Score = 1-2 drinks 3x/week    HEALTH MAINTENANCE:  Cholesterol: (  Cholesterol   Date Value Ref Range Status   10/12/2018 187 >200 mg/dL Final   2018 172 <200 mg/dL Final    History of abnormal lipids: No  Mammo: NA . History of abnormal Mammo: Not applicable, not due.  Regular Self Breast Exams: No  TSH: (  TSH   Date Value Ref Range Status   10/12/2018 0.84 0.30 - 5.00 uIU/mL Final    )  Pap; (  Lab Results   Component Value Date    PAP NIL 2018    PAP NIL 2016    )  Immunizations up to date: yes  (Gardasil, Tdap, Flu)  Health maintenance updated:  yes    HEALTHY HABITS  Eating habits: eats regular meals  Calcium/Vitamin D intake: source:  dairy Adequate?   Exercise: How often do you exercise? 3-5 times/week;Cardio  Have you had an eye exam " in the last two years? YES    Do you routinely see the dentist once or twice yearly? NO      HISTORY:  OB History    Para Term  AB Living   3 3 2 1 0 3   SAB TAB Ectopic Multiple Live Births   0 0 0 0 3      # Outcome Date GA Lbr Bernardo/2nd Weight Sex Delivery Anes PTL Lv   3  19 36w3d 02:03 / 01:13 3.25 kg (7 lb 2.6 oz) F Vag-Spont   ALEJANDRA      Name: Blanche      Apgar1: 9  Apgar5: 9   2 Term 16 37w1d  4.269 kg (9 lb 6.6 oz) F Vag-Spont EPI N ALEJANDRA      Apgar1: 8  Apgar5: 9   1 Term 14 37w2d 07:15 / 05:00 3.73 kg (8 lb 3.6 oz) M Vag-Spont EPI  ALEJANDRA      Apgar1: 8  Apgar5: 9     Past Medical History:   Diagnosis Date     Diabetes mellitus type 1 (H) 2014     Past Surgical History:   Procedure Laterality Date     DISCECTOMY LUMBAR MINIMALLY INVASIVE ONE LEVEL  ,     L4-L5     EYE SURGERY       FOOT SURGERY       Family History   Problem Relation Age of Onset     Family History Negative Mother      Family History Negative Father      Social History     Socioeconomic History     Marital status:      Spouse name: None     Number of children: None     Years of education: None     Highest education level: None   Occupational History     None   Social Needs     Financial resource strain: None     Food insecurity     Worry: None     Inability: None     Transportation needs     Medical: None     Non-medical: None   Tobacco Use     Smoking status: Never Smoker     Smokeless tobacco: Never Used   Substance and Sexual Activity     Alcohol use: Yes     Comment: socially      Drug use: No     Sexual activity: Yes     Partners: Male     Birth control/protection: Injection     Comment: Depo    Lifestyle     Physical activity     Days per week: None     Minutes per session: None     Stress: None   Relationships     Social connections     Talks on phone: None     Gets together: None     Attends Confucianism service: None     Active member of club or organization: None     Attends  meetings of clubs or organizations: None     Relationship status: None     Intimate partner violence     Fear of current or ex partner: None     Emotionally abused: None     Physically abused: None     Forced sexual activity: None   Other Topics Concern     Parent/sibling w/ CABG, MI or angioplasty before 65F 55M? No   Social History Narrative     None       Current Outpatient Medications:      blood glucose monitoring (NO BRAND SPECIFIED) test strip, Use to test blood sugars 6 times daily or as directed, Disp: 100 strip, Rfl: 11     insulin aspart (NOVOLOG FLEXPEN) 100 UNIT/ML pen, Inject 4 Units Subcutaneous Take with snacks or supplements for high blood sugar, Disp: , Rfl:      insulin glargine (LANTUS SOLOSTAR PEN) 100 UNIT/ML pen, Inject 20 Units Subcutaneous At Bedtime, Disp: , Rfl:      ondansetron (ZOFRAN-ODT) 4 MG ODT tab, Take 1 tablet (4 mg) by mouth every 8 hours as needed for nausea or vomiting (Patient not taking: Reported on 9/25/2020), Disp: 10 tablet, Rfl: 0   No Known Allergies    Past medical, surgical, social and family history were reviewed and updated in EPIC.    ROS:   CONSTITUTIONAL: NEGATIVE for fever, chills, change in weight  INTEGUMENTARU/SKIN: NEGATIVE for worrisome rashes, moles or lesions  EYES: NEGATIVE for vision changes or irritation  ENT: NEGATIVE for ear, mouth and throat problems  RESP: NEGATIVE for significant cough or SOB  BREAST: NEGATIVE for masses, tenderness or discharge  CV: NEGATIVE for chest pain, palpitations or peripheral edema  GI: NEGATIVE for nausea, abdominal pain, heartburn, or change in bowel habits  : NEGATIVE for unusual urinary or vaginal symptoms. Periods are regular.  MUSCULOSKELETAL: NEGATIVE for significant arthralgias or myalgia  NEURO: NEGATIVE for weakness, dizziness or paresthesias  PSYCHIATRIC: NEGATIVE for changes in mood or affect    PHYSICAL EXAM:  /72 (BP Location: Left arm, Patient Position: Chair, Cuff Size: Adult Regular)   Wt 73.9 kg  (162 lb 14.4 oz)   LMP 09/21/2020 (Exact Date)   Breastfeeding No   BMI 25.51 kg/m     BMI: Body mass index is 25.51 kg/m .  Constitutional: healthy, alert and no distress  Neck: symmetrical, thyroid normal size, no masses present, no lymphadenopathy present.   Cardiovascular: RRR, no murmurs present  Respiratory: breathing unlabored, lungs CTA bilaterally  Breast:normal without masses, tenderness or nipple discharge and no palpable axillary masses or adenopathy, right nipple slightly more elongated than left nipple, nontender, no erythema, exudate, or inversion. Calderon tubercles more prominent on right nipple.     Gastrointestinal: abdomen soft, non-tender, bowel sounds present  PELVIC EXAM:  Deferred    ASSESSMENT/PLAN:    ICD-10-CM    1. Annual physical exam  Z00.00    2. Type 1 diabetes mellitus without complication (H)  E10.9    3. Nipple symptom or sign in female  N64.59      No results found for any visits on 09/25/20.    1. Flu vaccine and depo given today. Follow up in 1yr for annual exam  2. DMT1 stable and managed by endocrinology. Pt states she sees  them q6 mo. She was wondering about getting a refill for her sensor filled here, I recommended she get this through endo so the correct rx is placed.   3. Counseled changed in nipple appearance can be normal after breastfeeding, especially if there was repeated nipple trauma. No s/s infection or masses. Counseled to try neosporin BIDx 7 days. If symptoms worsen or persist, she should follow up.     COUNSELING:   Reviewed preventive health counseling, as reflected in patient instructions   reports that she has never smoked. She has never used smokeless tobacco.      Lorenza Fung, LINA, CNM

## 2020-12-14 ENCOUNTER — HEALTH MAINTENANCE LETTER (OUTPATIENT)
Age: 35
End: 2020-12-14

## 2020-12-15 ENCOUNTER — TELEPHONE (OUTPATIENT)
Dept: OBGYN | Facility: CLINIC | Age: 35
End: 2020-12-15

## 2020-12-15 NOTE — TELEPHONE ENCOUNTER
depo provera injection scheduling.     Order active. Within date range.     Makenzie Spence RN Flex

## 2020-12-17 ENCOUNTER — ALLIED HEALTH/NURSE VISIT (OUTPATIENT)
Dept: FAMILY MEDICINE | Facility: CLINIC | Age: 35
End: 2020-12-17
Payer: COMMERCIAL

## 2020-12-17 DIAGNOSIS — Z30.42 DEPO-PROVERA CONTRACEPTIVE STATUS: Primary | ICD-10-CM

## 2020-12-17 PROCEDURE — 99207 PR NO CHARGE NURSE ONLY: CPT

## 2020-12-17 NOTE — PROGRESS NOTES
Clinic Administered Medication Documentation      Depo Provera Documentation    URINE HCG: not indicated    Depo-Provera Standing Order inclusion/exclusion criteria reviewed.   Patient meets: inclusion criteria     BP: Data Unavailable  LAST PAP/EXAM:   Lab Results   Component Value Date    PAP NIL 08/27/2018       Prior to injection, verified patient identity using patient's name and date of birth. Medication was administered. Please see MAR and medication order for additional information.     Was entire vial of medication used? Yes  Vial/Syringe: Single dose vial  Expiration Date:  04/2022    Patient instructed to remain in clinic for 15 minutes.  NEXT INJECTION DUE: 3/4/21 - 3/18/21    NASRA Escobedo

## 2020-12-22 ENCOUNTER — TRANSFERRED RECORDS (OUTPATIENT)
Dept: HEALTH INFORMATION MANAGEMENT | Facility: CLINIC | Age: 35
End: 2020-12-22

## 2020-12-22 LAB — RETINOPATHY: NEGATIVE

## 2021-01-07 ENCOUNTER — TRANSFERRED RECORDS (OUTPATIENT)
Dept: HEALTH INFORMATION MANAGEMENT | Facility: CLINIC | Age: 36
End: 2021-01-07

## 2021-01-07 LAB
ALT SERPL-CCNC: 17 U/L (ref 6–29)
CHOLEST SERPL-MCNC: 161 MG/DL
CREAT SERPL-MCNC: 0.75 MG/DL (ref 0.5–1.1)
GFR SERPL CREATININE-BSD FRML MDRD: 105 ML/MIN/1.73M2
GLUCOSE SERPL-MCNC: 252 MG/DL (ref 65–99)
HBA1C MFR BLD: 7.5 % (ref 4–6)
HDLC SERPL-MCNC: 46 MG/DL
LDLC SERPL CALC-MCNC: 87 MG/DL
NONHDLC SERPL-MCNC: 115 MG/DL
POTASSIUM SERPL-SCNC: 4.2 MMOL/L (ref 3.5–5.3)
TRIGL SERPL-MCNC: 185 MG/DL
TSH SERPL-ACNC: 1.25 UIU/ML (ref 0.3–5)

## 2021-03-10 ENCOUNTER — ALLIED HEALTH/NURSE VISIT (OUTPATIENT)
Dept: FAMILY MEDICINE | Facility: CLINIC | Age: 36
End: 2021-03-10
Payer: COMMERCIAL

## 2021-03-10 DIAGNOSIS — N91.2 ABSENCE OF MENSTRUATION: Primary | ICD-10-CM

## 2021-03-10 PROCEDURE — 99207 PR NO CHARGE NURSE ONLY: CPT

## 2021-03-10 NOTE — PROGRESS NOTES
Clinic Administered Medication Documentation      Depo Provera Documentation    URINE HCG: not indicated    Depo-Provera Standing Order inclusion/exclusion criteria reviewed.   Patient meets: inclusion criteria     BP: Data Unavailable  LAST PAP/EXAM:   Lab Results   Component Value Date    PAP NIL 08/27/2018       Prior to injection, verified patient identity using patient's name and date of birth. Medication was administered. Please see MAR and medication order for additional information.     Was entire vial of medication used? Yes  Vial/Syringe: Single dose vial  Expiration Date:  4/30/2022    Patient instructed to remain in clinic for 15 minutes.  NEXT INJECTION DUE: 5/26/21 - 6/9/21, given in [facility] at 1115am on 3/10/2020.Akbar Rome CMA

## 2021-06-11 ENCOUNTER — ALLIED HEALTH/NURSE VISIT (OUTPATIENT)
Dept: FAMILY MEDICINE | Facility: CLINIC | Age: 36
End: 2021-06-11
Payer: COMMERCIAL

## 2021-06-11 DIAGNOSIS — Z30.42 DEPO-PROVERA CONTRACEPTIVE STATUS: Primary | ICD-10-CM

## 2021-06-11 LAB — HCG UR QL: NEGATIVE

## 2021-06-11 PROCEDURE — 81025 URINE PREGNANCY TEST: CPT | Performed by: FAMILY MEDICINE

## 2021-06-11 PROCEDURE — 99207 PR NO CHARGE NURSE ONLY: CPT

## 2021-06-11 NOTE — PROGRESS NOTES
Clinic Administered Medication Documentation      Depo Provera Documentation    URINE HCG: negative    Depo-Provera Standing Order inclusion/exclusion criteria reviewed.   Patient meets: inclusion criteria     BP: Data Unavailable  LAST PAP/EXAM:   Lab Results   Component Value Date    PAP NIL 08/27/2018       Prior to injection, verified patient identity using patient's name and date of birth. Medication was administered. Please see MAR and medication order for additional information.     Was entire vial of medication used? Yes  Vial/Syringe: Single dose vial  Expiration Date:  09/22    Patient instructed to report any adverse reaction to staff immediately .  NEXT INJECTION DUE: 8/27/21 - 9/10/21

## 2021-08-06 ENCOUNTER — TRANSFERRED RECORDS (OUTPATIENT)
Dept: HEALTH INFORMATION MANAGEMENT | Facility: CLINIC | Age: 36
End: 2021-08-06

## 2021-08-07 ENCOUNTER — HEALTH MAINTENANCE LETTER (OUTPATIENT)
Age: 36
End: 2021-08-07

## 2021-08-27 ENCOUNTER — ALLIED HEALTH/NURSE VISIT (OUTPATIENT)
Dept: FAMILY MEDICINE | Facility: CLINIC | Age: 36
End: 2021-08-27
Payer: COMMERCIAL

## 2021-08-27 DIAGNOSIS — Z30.42 DEPO-PROVERA CONTRACEPTIVE STATUS: Primary | ICD-10-CM

## 2021-08-27 PROCEDURE — 99207 PR NO CHARGE NURSE ONLY: CPT

## 2021-08-27 PROCEDURE — 96372 THER/PROPH/DIAG INJ SC/IM: CPT | Performed by: ADVANCED PRACTICE MIDWIFE

## 2021-08-27 RX ADMIN — MEDROXYPROGESTERONE ACETATE 150 MG: 150 INJECTION, SUSPENSION INTRAMUSCULAR at 09:36

## 2021-08-27 NOTE — NURSING NOTE
Clinic Administered Medication Documentation    Administrations This Visit     medroxyPROGESTERone (DEPO-PROVERA) injection 150 mg     Admin Date  08/27/2021 Action  Given Dose  150 mg Route  Intramuscular Site  Right Ventrogluteal Administered By  Sraah Velez CMA    Ordering Provider: Lorenza Fung CNM    Patient Supplied?: No                  Depo Provera Documentation    URINE HCG: not indicated    Depo-Provera Standing Order inclusion/exclusion criteria reviewed.   Patient meets: inclusion criteria     BP: Data Unavailable  LAST PAP/EXAM:   Lab Results   Component Value Date    PAP NIL 08/27/2018       Prior to injection, verified patient identity using patient's name and date of birth. Medication was administered. Please see MAR and medication order for additional information.     Was entire vial of medication used? Yes  Vial/Syringe: Single dose vial  Expiration Date:  9/2022    Patient instructed to remain in clinic for 15 minutes and report any adverse reaction to staff immediately  .  NEXT INJECTION DUE: 11/12/21 - 11/26/21    Sarah Velez CMA

## 2021-10-02 ENCOUNTER — HEALTH MAINTENANCE LETTER (OUTPATIENT)
Age: 36
End: 2021-10-02

## 2021-11-27 ENCOUNTER — HEALTH MAINTENANCE LETTER (OUTPATIENT)
Age: 36
End: 2021-11-27

## 2021-12-03 ENCOUNTER — OFFICE VISIT (OUTPATIENT)
Dept: FAMILY MEDICINE | Facility: CLINIC | Age: 36
End: 2021-12-03
Payer: COMMERCIAL

## 2021-12-03 VITALS
SYSTOLIC BLOOD PRESSURE: 104 MMHG | RESPIRATION RATE: 16 BRPM | DIASTOLIC BLOOD PRESSURE: 64 MMHG | TEMPERATURE: 97.6 F | OXYGEN SATURATION: 100 % | BODY MASS INDEX: 27.5 KG/M2 | WEIGHT: 175.6 LBS | HEART RATE: 74 BPM

## 2021-12-03 DIAGNOSIS — Z30.9 ENCOUNTER FOR CONTRACEPTIVE MANAGEMENT, UNSPECIFIED TYPE: Primary | ICD-10-CM

## 2021-12-03 LAB — HCG UR QL: NEGATIVE

## 2021-12-03 PROCEDURE — 81025 URINE PREGNANCY TEST: CPT | Performed by: FAMILY MEDICINE

## 2021-12-03 PROCEDURE — 99212 OFFICE O/P EST SF 10 MIN: CPT | Mod: 25 | Performed by: FAMILY MEDICINE

## 2021-12-03 PROCEDURE — 96372 THER/PROPH/DIAG INJ SC/IM: CPT | Performed by: FAMILY MEDICINE

## 2021-12-03 RX ORDER — MEDROXYPROGESTERONE ACETATE 150 MG/ML
150 INJECTION, SUSPENSION INTRAMUSCULAR
Status: COMPLETED | OUTPATIENT
Start: 2021-12-03 | End: 2022-08-22

## 2021-12-03 RX ADMIN — MEDROXYPROGESTERONE ACETATE 150 MG: 150 INJECTION, SUSPENSION INTRAMUSCULAR at 15:28

## 2021-12-03 NOTE — PROGRESS NOTES
Clinic Administered Medication Documentation    Administrations This Visit     medroxyPROGESTERone (DEPO-PROVERA) injection 150 mg     Admin Date  12/03/2021 Action  Given Dose  150 mg Route  Intramuscular Site  Left Ventrogluteal Administered By  Malika Chadwick    Ordering Provider: Nena Cagle MD    Patient Supplied?: No                  Depo Provera Documentation    URINE HCG: negative    Depo-Provera Standing Order inclusion/exclusion criteria reviewed.   Patient meets: inclusion criteria     BP: 104/64  LAST PAP/EXAM:   Lab Results   Component Value Date    PAP NIL 08/27/2018       Prior to injection, verified patient identity using patient's name and date of birth. Medication was administered. Please see MAR and medication order for additional information.     Was entire vial of medication used? Yes  Vial/Syringe: Single dose vial  Expiration Date:  04/2023    Patient instructed to remain in clinic for 15 minutes and report any adverse reaction to staff immediately .  NEXT INJECTION DUE: 2/18/22 - 3/4/22  Malika Chadwick CMA  [facility]

## 2021-12-03 NOTE — PROGRESS NOTES
Assessment & Plan     Encounter for contraceptive management, unspecified type  - medroxyPROGESTERone (DEPO-PROVERA) injection 150 mg  - HCG qualitative urine -negative   Explain side effects of depo .  Alternative contraception discussed .        Return in about 1 year (around 12/3/2022) for Routine preventive.    Nena Cagle MD  [facility] RICARDO Cunha is a 36 year old who presents for the following health issues     History of Present Illness       She eats 2-3 servings of fruits and vegetables daily.She consumes 1 sweetened beverage(s) daily.She exercises with enough effort to increase her heart rate 30 to 60 minutes per day.  She exercises with enough effort to increase her heart rate 5 days per week.   She is taking medications regularly.       Concern - contraception  Patient needs orders for her depo shot  Doing well denies any complaint     Review of Systems   Genitourinary: Negative for decreased urine volume, dysuria, pelvic pain and urgency.   Psychiatric/Behavioral: Negative for behavioral problems and confusion.            Objective    /64   Pulse 74   Temp 97.6  F (36.4  C) (Tympanic)   Resp 16   Wt 79.7 kg (175 lb 9.6 oz)   SpO2 100%   BMI 27.50 kg/m    Body mass index is 27.5 kg/m .  Physical Exam  Cardiovascular:      Pulses: Normal pulses.   Pulmonary:      Effort: Pulmonary effort is normal.      Breath sounds: Normal breath sounds.   Psychiatric:         Mood and Affect: Mood normal.

## 2021-12-08 ASSESSMENT — ENCOUNTER SYMPTOMS
DYSURIA: 0
CONFUSION: 0

## 2022-02-17 PROBLEM — O24.919 UNSPECIFIED DIABETES MELLITUS IN PREGNANCY, UNSPECIFIED TRIMESTER: Status: ACTIVE | Noted: 2019-02-05

## 2022-03-01 ENCOUNTER — MYC MEDICAL ADVICE (OUTPATIENT)
Dept: FAMILY MEDICINE | Facility: CLINIC | Age: 37
End: 2022-03-01

## 2022-03-01 ENCOUNTER — OFFICE VISIT (OUTPATIENT)
Dept: FAMILY MEDICINE | Facility: CLINIC | Age: 37
End: 2022-03-01
Payer: COMMERCIAL

## 2022-03-01 VITALS
RESPIRATION RATE: 18 BRPM | TEMPERATURE: 99.7 F | HEART RATE: 77 BPM | BODY MASS INDEX: 27.02 KG/M2 | DIASTOLIC BLOOD PRESSURE: 78 MMHG | HEIGHT: 66 IN | OXYGEN SATURATION: 100 % | WEIGHT: 168.1 LBS | SYSTOLIC BLOOD PRESSURE: 116 MMHG

## 2022-03-01 DIAGNOSIS — R21 RASH: ICD-10-CM

## 2022-03-01 DIAGNOSIS — Z00.00 ROUTINE HISTORY AND PHYSICAL EXAMINATION OF ADULT: Primary | ICD-10-CM

## 2022-03-01 DIAGNOSIS — Z23 NEED FOR VACCINATION: ICD-10-CM

## 2022-03-01 DIAGNOSIS — E10.9 TYPE 1 DIABETES MELLITUS WITHOUT COMPLICATION (H): ICD-10-CM

## 2022-03-01 DIAGNOSIS — Z71.89 ADVANCED CARE PLANNING/COUNSELING DISCUSSION: ICD-10-CM

## 2022-03-01 PROBLEM — E11.9 DIABETES (H): Status: RESOLVED | Noted: 2019-02-08 | Resolved: 2022-03-01

## 2022-03-01 PROBLEM — O24.919 UNSPECIFIED DIABETES MELLITUS IN PREGNANCY, UNSPECIFIED TRIMESTER: Status: RESOLVED | Noted: 2019-02-05 | Resolved: 2022-03-01

## 2022-03-01 PROCEDURE — 0064A COVID-19,PF,MODERNA (18+ YRS BOOSTER .25ML): CPT | Performed by: PHYSICIAN ASSISTANT

## 2022-03-01 PROCEDURE — 91306 COVID-19,PF,MODERNA (18+ YRS BOOSTER .25ML): CPT | Performed by: PHYSICIAN ASSISTANT

## 2022-03-01 PROCEDURE — 96372 THER/PROPH/DIAG INJ SC/IM: CPT | Performed by: FAMILY MEDICINE

## 2022-03-01 PROCEDURE — 99395 PREV VISIT EST AGE 18-39: CPT | Performed by: PHYSICIAN ASSISTANT

## 2022-03-01 RX ORDER — MUPIROCIN 20 MG/G
OINTMENT TOPICAL 3 TIMES DAILY
Qty: 22 G | Refills: 1 | Status: SHIPPED | OUTPATIENT
Start: 2022-03-01 | End: 2022-03-08

## 2022-03-01 RX ORDER — TRIAMCINOLONE ACETONIDE 1 MG/G
CREAM TOPICAL
Qty: 30 G | Refills: 1 | Status: SHIPPED | OUTPATIENT
Start: 2022-03-01 | End: 2024-04-03

## 2022-03-01 RX ADMIN — MEDROXYPROGESTERONE ACETATE 150 MG: 150 INJECTION, SUSPENSION INTRAMUSCULAR at 10:23

## 2022-03-01 SDOH — ECONOMIC STABILITY: FOOD INSECURITY: WITHIN THE PAST 12 MONTHS, THE FOOD YOU BOUGHT JUST DIDN'T LAST AND YOU DIDN'T HAVE MONEY TO GET MORE.: NEVER TRUE

## 2022-03-01 SDOH — ECONOMIC STABILITY: TRANSPORTATION INSECURITY
IN THE PAST 12 MONTHS, HAS LACK OF TRANSPORTATION KEPT YOU FROM MEETINGS, WORK, OR FROM GETTING THINGS NEEDED FOR DAILY LIVING?: NO

## 2022-03-01 SDOH — HEALTH STABILITY: PHYSICAL HEALTH: ON AVERAGE, HOW MANY DAYS PER WEEK DO YOU ENGAGE IN MODERATE TO STRENUOUS EXERCISE (LIKE A BRISK WALK)?: 5 DAYS

## 2022-03-01 SDOH — ECONOMIC STABILITY: FOOD INSECURITY: WITHIN THE PAST 12 MONTHS, YOU WORRIED THAT YOUR FOOD WOULD RUN OUT BEFORE YOU GOT MONEY TO BUY MORE.: NEVER TRUE

## 2022-03-01 SDOH — ECONOMIC STABILITY: TRANSPORTATION INSECURITY
IN THE PAST 12 MONTHS, HAS THE LACK OF TRANSPORTATION KEPT YOU FROM MEDICAL APPOINTMENTS OR FROM GETTING MEDICATIONS?: NO

## 2022-03-01 SDOH — ECONOMIC STABILITY: INCOME INSECURITY: HOW HARD IS IT FOR YOU TO PAY FOR THE VERY BASICS LIKE FOOD, HOUSING, MEDICAL CARE, AND HEATING?: NOT VERY HARD

## 2022-03-01 SDOH — ECONOMIC STABILITY: INCOME INSECURITY: IN THE LAST 12 MONTHS, WAS THERE A TIME WHEN YOU WERE NOT ABLE TO PAY THE MORTGAGE OR RENT ON TIME?: NO

## 2022-03-01 SDOH — HEALTH STABILITY: PHYSICAL HEALTH: ON AVERAGE, HOW MANY MINUTES DO YOU ENGAGE IN EXERCISE AT THIS LEVEL?: 50 MIN

## 2022-03-01 ASSESSMENT — ENCOUNTER SYMPTOMS
PALPITATIONS: 0
DIARRHEA: 0
HEADACHES: 0
SORE THROAT: 0
JOINT SWELLING: 0
CONSTIPATION: 0
EYE PAIN: 0
HEMATOCHEZIA: 0
DYSURIA: 0
ABDOMINAL PAIN: 0
FREQUENCY: 0
SHORTNESS OF BREATH: 0
HEARTBURN: 0
MYALGIAS: 0
COUGH: 0
WEAKNESS: 0
ARTHRALGIAS: 0
FEVER: 0
DIZZINESS: 0
NERVOUS/ANXIOUS: 0
HEMATURIA: 0
CHILLS: 0
PARESTHESIAS: 0
BREAST MASS: 0
NAUSEA: 0

## 2022-03-01 ASSESSMENT — SOCIAL DETERMINANTS OF HEALTH (SDOH)
HOW OFTEN DO YOU ATTEND CHURCH OR RELIGIOUS SERVICES?: NEVER
IN A TYPICAL WEEK, HOW MANY TIMES DO YOU TALK ON THE PHONE WITH FAMILY, FRIENDS, OR NEIGHBORS?: MORE THAN THREE TIMES A WEEK
DO YOU BELONG TO ANY CLUBS OR ORGANIZATIONS SUCH AS CHURCH GROUPS UNIONS, FRATERNAL OR ATHLETIC GROUPS, OR SCHOOL GROUPS?: YES
HOW OFTEN DO YOU GET TOGETHER WITH FRIENDS OR RELATIVES?: ONCE A WEEK

## 2022-03-01 ASSESSMENT — LIFESTYLE VARIABLES
HOW OFTEN DO YOU HAVE SIX OR MORE DRINKS ON ONE OCCASION: LESS THAN MONTHLY
HOW OFTEN DO YOU HAVE A DRINK CONTAINING ALCOHOL: 2-3 TIMES A WEEK
HOW MANY STANDARD DRINKS CONTAINING ALCOHOL DO YOU HAVE ON A TYPICAL DAY: 1 OR 2

## 2022-03-01 NOTE — PROGRESS NOTES
Clinic Administered Medication Documentation    Administrations This Visit     medroxyPROGESTERone (DEPO-PROVERA) injection 150 mg     Admin Date  03/01/2022 Action  Given Dose  150 mg Route  Intramuscular Site  Left Gluteus Tariq Administered By  Arya Disla CMA    Ordering Provider: Nena Cagle MD    Patient Supplied?: No                  Depo Provera Documentation    URINE HCG: not indicated    Depo-Provera Standing Order inclusion/exclusion criteria reviewed.   Patient meets: inclusion criteria     BP: 116/78  LAST PAP/EXAM:   Lab Results   Component Value Date    PAP NIL 08/27/2018       Prior to injection, verified patient identity using patient's name and date of birth. Medication was administered. Please see MAR and medication order for additional information.     Was entire vial of medication used? Yes  Vial/Syringe: Single dose vial  Expiration Date:  4/1/23  Left Glute    Patient instructed to return in dates given.  NEXT INJECTION DUE: 5/17/22 - 5/31/22    Arya Disla CMA

## 2022-03-01 NOTE — PROGRESS NOTES
SUBJECTIVE:   CC: Guerline oRck is an 36 year old woman who presents for preventive health visit.       Patient has been advised of split billing requirements and indicates understanding: Yes  Healthy Habits:     Getting at least 3 servings of Calcium per day:  Yes    Bi-annual eye exam:  Yes    Dental care twice a year:  Yes    Sleep apnea or symptoms of sleep apnea:  None    Diet:  Diabetic    Frequency of exercise:  4-5 days/week    Duration of exercise:  30-45 minutes    Taking medications regularly:  Yes    Medication side effects:  None    PHQ-2 Total Score: 0    Additional concerns today:  Yes    1 yr rash/itchiness between breasts.  Right breast rash since stopped breast feeding over 2 years ago.    Today's PHQ-2 Score:   PHQ-2 ( 1999 Pfizer) 3/1/2022   Q1: Little interest or pleasure in doing things 0   Q2: Feeling down, depressed or hopeless 0   PHQ-2 Score 0   PHQ-2 Total Score (12-17 Years)- Positive if 3 or more points; Administer PHQ-A if positive -   Q1: Little interest or pleasure in doing things Not at all   Q2: Feeling down, depressed or hopeless Not at all   PHQ-2 Score 0       Abuse: Current or Past (Physical, Sexual or Emotional) - No  Do you feel safe in your environment? Yes        Social History     Tobacco Use     Smoking status: Never Smoker     Smokeless tobacco: Never Used   Substance Use Topics     Alcohol use: Yes     Comment: socially          Alcohol Use 3/1/2022   Prescreen: >3 drinks/day or >7 drinks/week? No   Prescreen: >3 drinks/day or >7 drinks/week? -         Reviewed orders with patient.  Reviewed health maintenance and updated orders accordingly - Yes  BP Readings from Last 3 Encounters:   03/01/22 116/78   12/03/21 104/64   09/25/20 108/72    Wt Readings from Last 3 Encounters:   03/01/22 76.2 kg (168 lb 1.6 oz)   12/03/21 79.7 kg (175 lb 9.6 oz)   09/25/20 73.9 kg (162 lb 14.4 oz)                  Recent Labs   Lab Test 01/07/21  0000 03/13/20  0000 03/06/19  0000  02/06/19  0624 10/23/18  1138 10/12/18  0000 05/14/18  0000   A1C 7.5* 7.5* 7.1*  --   --  6.2* 7.0*   LDL 87  --   --   --   --  107* 107*   HDL 46*  --   --   --   --  54 44*   TRIG 185*  --   --   --   --  145 115   ALT 17  --   --   --  25 14 20   CR 0.75  --   --  0.54 0.57 0.52 0.65   GFRESTIMATED 105  --   --  >90 >90 127 119   GFRESTBLACK 121  --   --  >90 >90 148 138   POTASSIUM 4.2  --   --  3.8  --   --  4.2   TSH 1.25  --   --   --   --  0.84 1.38        Breast Cancer Screening:    Breast CA Risk Assessment (FHS-7) 3/1/2022   Do you have a family history of breast, colon, or ovarian cancer? No / Unknown         Patient under 40 years of age: Routine Mammogram Screening not recommended.   Pertinent mammograms are reviewed under the imaging tab.    History of abnormal Pap smear: NO - age 30-65 PAP every 5 years with negative HPV co-testing recommended  PAP / HPV Latest Ref Rng & Units 8/27/2018 2/23/2016   PAP (Historical) - NIL NIL   HPV16 NEG:Negative Negative -   HPV18 NEG:Negative Negative -   HRHPV NEG:Negative Negative -     Reviewed and updated as needed this visit by clinical staff   Tobacco  Allergies  Meds  Problems  Med Hx  Surg Hx  Fam Hx  Soc   Hx        Reviewed and updated as needed this visit by Provider   Tobacco  Allergies  Meds  Problems  Med Hx  Surg Hx  Fam Hx           Review of Systems   Constitutional: Negative for chills and fever.   HENT: Negative for congestion, ear pain, hearing loss and sore throat.    Eyes: Negative for pain and visual disturbance.   Respiratory: Negative for cough and shortness of breath.    Cardiovascular: Negative for chest pain, palpitations and peripheral edema.   Gastrointestinal: Negative for abdominal pain, constipation, diarrhea, heartburn, hematochezia and nausea.   Breasts:  Negative for tenderness, breast mass and discharge.   Genitourinary: Negative for dysuria, frequency, genital sores, hematuria, pelvic pain, urgency, vaginal  "bleeding and vaginal discharge.   Musculoskeletal: Negative for arthralgias, joint swelling and myalgias.   Skin: Negative for rash.   Neurological: Negative for dizziness, weakness, headaches and paresthesias.   Psychiatric/Behavioral: Negative for mood changes. The patient is not nervous/anxious.         OBJECTIVE:   /78   Pulse 77   Temp 99.7  F (37.6  C) (Oral)   Resp 18   Ht 1.676 m (5' 6\")   Wt 76.2 kg (168 lb 1.6 oz)   SpO2 100%   BMI 27.13 kg/m    Physical Exam  GENERAL: healthy, alert and no distress  EYES: Eyes grossly normal to inspection, PERRL and conjunctivae and sclerae normal  HENT: ear canals and TM's normal, nose and mouth without ulcers or lesions  NECK: no adenopathy, no asymmetry, masses, or scars and thyroid normal to palpation  RESP: lungs clear to auscultation - no rales, rhonchi or wheezes  BREAST: normal without masses, tenderness or nipple discharge, no palpable axillary masses or adenopathy, dermatitis central and scabbing of right nipple.  CV: regular rate and rhythm, normal S1 S2, no S3 or S4, no murmur, click or rub, no peripheral edema and peripheral pulses strong  ABDOMEN: soft, nontender, no hepatosplenomegaly, no masses and bowel sounds normal  MS: no gross musculoskeletal defects noted, no edema  SKIN: no suspicious lesions or rashes  NEURO: Normal strength and tone, mentation intact and speech normal  PSYCH: mentation appears normal, affect normal/bright    Diagnostic Test Results:  Labs reviewed in Epic    ASSESSMENT/PLAN:       ICD-10-CM    1. Routine history and physical examination of adult  Z00.00    2. Type 1 diabetes mellitus without complication (H)  E10.9    3. Advanced care planning/counseling discussion  Z71.89    4. Rash  R21 triamcinolone (KENALOG) 0.1 % external cream     mupirocin (BACTROBAN) 2 % external ointment   5. Need for vaccination  Z23 COVID-19,PF,MODERNA (18+ YRS BOOSTER .25ML)       Patient has been advised of split billing requirements and " "indicates understanding: Yes    COUNSELING:  Reviewed preventive health counseling, as reflected in patient instructions    Estimated body mass index is 27.13 kg/m  as calculated from the following:    Height as of this encounter: 1.676 m (5' 6\").    Weight as of this encounter: 76.2 kg (168 lb 1.6 oz).    Weight management plan: Discussed healthy diet and exercise guidelines    She reports that she has never smoked. She has never used smokeless tobacco.      Counseling Resources:  ATP IV Guidelines  Pooled Cohorts Equation Calculator  Breast Cancer Risk Calculator  BRCA-Related Cancer Risk Assessment: FHS-7 Tool  FRAX Risk Assessment  ICSI Preventive Guidelines  Dietary Guidelines for Americans, 2010  USDA's MyPlate  ASA Prophylaxis  Lung CA Screening    Luann Ballesteros PA-C  [facility]  "

## 2022-03-19 ENCOUNTER — HEALTH MAINTENANCE LETTER (OUTPATIENT)
Age: 37
End: 2022-03-19

## 2022-05-17 ENCOUNTER — ALLIED HEALTH/NURSE VISIT (OUTPATIENT)
Dept: FAMILY MEDICINE | Facility: CLINIC | Age: 37
End: 2022-05-17
Payer: COMMERCIAL

## 2022-05-17 DIAGNOSIS — Z30.42 DEPO-PROVERA CONTRACEPTIVE STATUS: Primary | ICD-10-CM

## 2022-05-17 PROCEDURE — 96372 THER/PROPH/DIAG INJ SC/IM: CPT | Performed by: FAMILY MEDICINE

## 2022-05-17 PROCEDURE — 99207 PR NO CHARGE NURSE ONLY: CPT

## 2022-05-17 RX ADMIN — MEDROXYPROGESTERONE ACETATE 150 MG: 150 INJECTION, SUSPENSION INTRAMUSCULAR at 11:16

## 2022-05-17 NOTE — PROGRESS NOTES
BP: Data Unavailable    LAST PAP/EXAM:   Lab Results   Component Value Date    PAP NIL 08/27/2018     URINE HCG:not indicated    The following medication was given:     MEDICATION: Depo Provera 150mg  ROUTE: IM  SITE: [facility]  : [facility]  LOT #: [id number]  EXP:09/01/2023  NEXT INJECTION DUE: 8/2/22 - 8/16/22   Provider: Meghan Quinn CMA

## 2022-06-29 ENCOUNTER — TELEPHONE (OUTPATIENT)
Dept: FAMILY MEDICINE | Facility: CLINIC | Age: 37
End: 2022-06-29

## 2022-06-29 NOTE — LETTER
[facility]  [address] [address]  [phone]  June 29, 2022    Guerline Rock  [address]  [place] [address]    Dear Guerline,    I care about your health and have reviewed your health plan. I have reviewed your medical conditions, medication list, and lab results and am making recommendations based on this review, to better manage your health.    You are in particular need of attention regarding:  -Diabetes    I am recommending that you:  {recommendations:-schedule a FOLLOWUP OFFICE APPOINTMENT with me.  I will recheck your: A1c, Lipids (fasting cholesterol - nothing to eat except water and/or meds for 8+ hours) and BMP (basic metabolic panel) tests.    Here is a list of Health Maintenance topics that are due now or due soon:  Health Maintenance Due   Topic Date Due     ANNUAL REVIEW OF HM ORDERS  Never done     Pneumococcal Vaccine: Pediatrics (0 to 5 Years) and At-Risk Patients (6 to 64 Years) (1 - PCV) Never done     HEPATITIS B IMMUNIZATION (2 of 3 - Risk 3-dose series) 07/25/2005     DIABETIC FOOT EXAM  05/14/2019     BMP  02/06/2020     MICROALBUMIN  03/13/2021     A1C  07/07/2021     EYE EXAM  12/22/2021     LIPID  01/07/2022       Please call us at [phone] (or use [employer]) to address the above recommendations.     Thank you for trusting [facility] and we appreciate the opportunity to serve you.  We look forward to supporting your healthcare needs in the future.    Healthy Regards,    Luann Ballesteros

## 2022-06-29 NOTE — TELEPHONE ENCOUNTER
Patient Quality Outreach 2nd Attempt      Summary:    Type of outreach:    Sent letter.    Next Steps:  Reach out within 90 days via Phone and Letter.    Max number of attempts reached: Yes. Will try again in 90 days if patient still on fail list.    Questions for provider review:    None                                                                                                                    Genet Cormier CMA     Chart routed to Care Team.

## 2022-06-29 NOTE — TELEPHONE ENCOUNTER
Patient Quality Outreach    Patient is due for the following:   Diabetes -  A1C, LDL (Fasting), Eye Exam, Microalbumin and Diabetic Follow-Up Visit    NEXT STEPS:   Schedule a office visit for Diabetic    Type of outreach:    Attempted to call pt and call got hung up on. Sent a letter as well to pt.      Questions for provider review:    None     Genet Cormier MA

## 2022-08-22 ENCOUNTER — ALLIED HEALTH/NURSE VISIT (OUTPATIENT)
Dept: FAMILY MEDICINE | Facility: CLINIC | Age: 37
End: 2022-08-22
Payer: COMMERCIAL

## 2022-08-22 DIAGNOSIS — Z30.42 DEPO-PROVERA CONTRACEPTIVE STATUS: Primary | ICD-10-CM

## 2022-08-22 LAB — HCG UR QL: NEGATIVE

## 2022-08-22 PROCEDURE — 99207 PR NO CHARGE NURSE ONLY: CPT

## 2022-08-22 PROCEDURE — 81025 URINE PREGNANCY TEST: CPT

## 2022-08-22 PROCEDURE — 96372 THER/PROPH/DIAG INJ SC/IM: CPT | Performed by: FAMILY MEDICINE

## 2022-08-22 RX ADMIN — MEDROXYPROGESTERONE ACETATE 150 MG: 150 INJECTION, SUSPENSION INTRAMUSCULAR at 14:28

## 2022-08-22 NOTE — PROGRESS NOTES
LAST PAP/EXAM:   Lab Results   Component Value Date    PAP NIL 08/27/2018     URINE HCG:negative     The following medication was given:     MEDICATION: Depo Provera 150mg  ROUTE: IM  SITE: Ventrogluteal - Right  : [employer]  LOT #: [id number]   EXP:01/01/2024  NEXT INJECTION DUE: 11/7/22 - 11/21/22   Provider: Dr. Cagle    Pt took a pregnancy test as she was a few days late on her depo shot. Her result came back negative.     Genet Cormier, CMA

## 2022-09-03 ENCOUNTER — HEALTH MAINTENANCE LETTER (OUTPATIENT)
Age: 37
End: 2022-09-03

## 2022-11-21 ENCOUNTER — ALLIED HEALTH/NURSE VISIT (OUTPATIENT)
Dept: FAMILY MEDICINE | Facility: CLINIC | Age: 37
End: 2022-11-21
Payer: COMMERCIAL

## 2022-11-21 DIAGNOSIS — Z30.42 DEPO-PROVERA CONTRACEPTIVE STATUS: Primary | ICD-10-CM

## 2022-11-21 PROCEDURE — 99207 PR NO CHARGE NURSE ONLY: CPT

## 2022-11-21 PROCEDURE — 96372 THER/PROPH/DIAG INJ SC/IM: CPT | Performed by: PHYSICIAN ASSISTANT

## 2022-11-21 RX ORDER — MEDROXYPROGESTERONE ACETATE 150 MG/ML
150 INJECTION, SUSPENSION INTRAMUSCULAR
Status: COMPLETED | OUTPATIENT
Start: 2022-11-21 | End: 2023-10-05

## 2022-11-21 RX ADMIN — MEDROXYPROGESTERONE ACETATE 150 MG: 150 INJECTION, SUSPENSION INTRAMUSCULAR at 16:45

## 2022-11-21 NOTE — PROGRESS NOTES
BP: Data Unavailable    LAST PAP/EXAM:   Lab Results   Component Value Date    PAP NIL 08/27/2018     URINE HCG:not indicated    The following medication was given:     MEDICATION: Depo Provera 150mg  ROUTE: IM  SITE: [facility]  : [employer]  LOT #: [id number]  EXP:04/30/2024  NEXT INJECTION DUE: 2/6/23 - 2/20/23   Provider: DR Ballesteros

## 2022-12-01 ENCOUNTER — OFFICE VISIT (OUTPATIENT)
Dept: FAMILY MEDICINE | Facility: CLINIC | Age: 37
End: 2022-12-01
Payer: COMMERCIAL

## 2022-12-01 VITALS
OXYGEN SATURATION: 99 % | TEMPERATURE: 98.3 F | HEIGHT: 66 IN | DIASTOLIC BLOOD PRESSURE: 68 MMHG | WEIGHT: 172.7 LBS | SYSTOLIC BLOOD PRESSURE: 118 MMHG | HEART RATE: 102 BPM | RESPIRATION RATE: 16 BRPM | BODY MASS INDEX: 27.76 KG/M2

## 2022-12-01 DIAGNOSIS — E10.9 TYPE 1 DIABETES MELLITUS WITHOUT COMPLICATION (H): ICD-10-CM

## 2022-12-01 DIAGNOSIS — Z23 NEED FOR INFLUENZA VACCINATION: Primary | ICD-10-CM

## 2022-12-01 PROCEDURE — 90471 IMMUNIZATION ADMIN: CPT | Performed by: NURSE PRACTITIONER

## 2022-12-01 PROCEDURE — 90686 IIV4 VACC NO PRSV 0.5 ML IM: CPT | Performed by: NURSE PRACTITIONER

## 2022-12-01 PROCEDURE — 99213 OFFICE O/P EST LOW 20 MIN: CPT | Mod: 25 | Performed by: NURSE PRACTITIONER

## 2022-12-01 ASSESSMENT — ENCOUNTER SYMPTOMS
MYALGIAS: 0
SHORTNESS OF BREATH: 0
FREQUENCY: 0
CONSTIPATION: 0
SORE THROAT: 0
FEVER: 0
HEMATURIA: 0
JOINT SWELLING: 0
NERVOUS/ANXIOUS: 0
DIZZINESS: 0
HEMATOCHEZIA: 0
DIARRHEA: 0
CHILLS: 0
ARTHRALGIAS: 0
NAUSEA: 0
PALPITATIONS: 0
HEARTBURN: 0
ABDOMINAL PAIN: 0
HEADACHES: 1
PARESTHESIAS: 0
WEAKNESS: 0
DYSURIA: 0
COUGH: 1
EYE PAIN: 0

## 2022-12-01 NOTE — PROGRESS NOTES
"   Assessment & Plan     Guerline was seen today for recheck medication.    Diagnoses and all orders for this visit:    Need for influenza vaccination  -     INFLUENZA VACCINE IM > 6 MONTHS VALENT IIV4 (AFLURIA/FLUZONE)    Type 1 diabetes mellitus without complication (H)  Stable, follows with Dr. Rojas at [facility].   MARINA obtained for recheck lab results.           BMI:   Estimated body mass index is 27.87 kg/m  as calculated from the following:    Height as of this encounter: 1.676 m (5' 6\").    Weight as of this encounter: 78.3 kg (172 lb 11.2 oz).       Return in about 4 months (around 4/1/2023) for Preventative Visit .      Yohana Ray, LINA [facility] [place]        [facility]   Guerline is a 37 year old, presenting for the following health issues:  Recheck Medication      HPI     Diabetes Follow-up    How often are you checking your blood sugar? Four or more times daily  Blood sugar testing frequency justification:  Adjustment of medication(s)  What time of day are you checking your blood sugars (select all that apply)?  Before and after meals  Have you had any blood sugars above 200?  Yes 300  Have you had any blood sugars below 70?  Yes 60    What symptoms do you notice when your blood sugar is low?  Shaky, Dizzy and Weak    What concerns do you have today about your diabetes? None     Do you have any of these symptoms? (Select all that apply)  No numbness or tingling in feet.  No redness, sores or blisters on feet.  No complaints of excessive thirst.  No reports of blurry vision.  No significant changes to weight.    Have you had a diabetic eye exam in the last 12 months? No    Follows with Dr. Rojas - [facility]   HgbA1C = 7.2% at last check.   Would like to try insulin pump again, hasn't used this for several years.    Wears CGM.        BP Readings from Last 2 Encounters:   12/01/22 118/68   03/01/22 116/78     Hemoglobin A1C (%)   Date " "Value   01/07/2021 7.5 (A)   03/13/2020 7.5 (A)     LDL Cholesterol Calculated (mg/dL)   Date Value   01/07/2021 87   10/12/2018 107 (H)         How many servings of fruits and vegetables do you eat daily?  2-3    On average, how many sweetened beverages do you drink each day (Examples: soda, juice, sweet tea, etc.  Do NOT count diet or artificially sweetened beverages)?   1    How many days per week do you exercise enough to make your heart beat faster? 4    How many minutes a day do you exercise enough to make your heart beat faster? 30 - 60    How many days per week do you miss taking your medication? 0      Review of Systems   Constitutional, HEENT, cardiovascular, pulmonary, gi and gu systems are negative, except as otherwise noted.      Objective    /68   Pulse 102   Temp 98.3  F (36.8  C) (Tympanic)   Resp 16   Ht 1.676 m (5' 6\")   Wt 78.3 kg (172 lb 11.2 oz)   LMP  (LMP Unknown)   SpO2 99%   BMI 27.87 kg/m    Body mass index is 27.87 kg/m .     Physical Exam     GENERAL: healthy, alert and no distress  RESP: lungs clear to auscultation - no rales, rhonchi or wheezes  CV: regular rate and rhythm, normal S1 S2, no S3 or S4, no murmur  PSYCH: mentation appears normal, affect normal/bright                "

## 2023-01-15 ENCOUNTER — HEALTH MAINTENANCE LETTER (OUTPATIENT)
Age: 38
End: 2023-01-15

## 2023-02-16 ENCOUNTER — ALLIED HEALTH/NURSE VISIT (OUTPATIENT)
Dept: FAMILY MEDICINE | Facility: CLINIC | Age: 38
End: 2023-02-16
Payer: COMMERCIAL

## 2023-02-16 DIAGNOSIS — Z30.42 DEPO-PROVERA CONTRACEPTIVE STATUS: Primary | ICD-10-CM

## 2023-02-16 PROCEDURE — 99207 PR NO CHARGE NURSE ONLY: CPT

## 2023-02-16 PROCEDURE — 96372 THER/PROPH/DIAG INJ SC/IM: CPT | Performed by: PHYSICIAN ASSISTANT

## 2023-02-16 RX ADMIN — MEDROXYPROGESTERONE ACETATE 150 MG: 150 INJECTION, SUSPENSION INTRAMUSCULAR at 14:15

## 2023-02-16 NOTE — PROGRESS NOTES
Clinic Administered Medication Documentation    Administrations This Visit     medroxyPROGESTERone (DEPO-PROVERA) injection 150 mg     Admin Date  02/16/2023 Action  Given Dose  150 mg Route  Intramuscular Site  Right Gluteus Tariq Administered By  Arya Disla CMA    Ordering Provider: Luann Ballesteros PA-C    Patient Supplied?: No                  Depo Provera Documentation    URINE HCG: not indicated    Depo-Provera Standing Order inclusion/exclusion criteria reviewed.   Patient meets: inclusion criteria     BP: Data Unavailable  LAST PAP/EXAM:   Lab Results   Component Value Date    PAP NIL 08/27/2018       Prior to injection, verified patient identity using patient's name and date of birth. Medication was administered. Please see MAR and medication order for additional information.     Was entire vial of medication used? Yes  Vial/Syringe: Single dose vial  Expiration Date:  5-1-24    Patient instructed to return in window given.  NEXT INJECTION DUE: 5/4/23 - 5/18/23    Right Teddy Disla CMA

## 2023-04-27 ENCOUNTER — TELEPHONE (OUTPATIENT)
Dept: FAMILY MEDICINE | Facility: CLINIC | Age: 38
End: 2023-04-27

## 2023-04-27 NOTE — TELEPHONE ENCOUNTER
Needs of attention regarding:  -Diabetes EYE    Health Maintenance Topics with due status: Overdue       Topic Date Due    ANNUAL REVIEW OF HM ORDERS Never done    Pneumococcal Vaccine: Pediatrics (0 to 5 Years) and At-Risk Patients (6 to 64 Years) Never done    HEPATITIS B IMMUNIZATION 07/25/2005    DIABETIC FOOT EXAM 05/14/2019    BMP 02/06/2020    MICROALBUMIN 03/13/2021    A1C 07/07/2021    EYE EXAM 12/22/2021    LIPID 01/07/2022    COVID-19 Vaccine 04/26/2022    PHQ-2 (once per calendar year) 01/01/2023    YEARLY PREVENTIVE VISIT 03/01/2023     Health Maintenance Topics with due status: Due Soon       Topic Date Due    HPV TEST 08/27/2023    PAP 08/27/2023       Communication:  See Elif

## 2023-04-29 ENCOUNTER — HEALTH MAINTENANCE LETTER (OUTPATIENT)
Age: 38
End: 2023-04-29

## 2023-05-09 ENCOUNTER — TRANSFERRED RECORDS (OUTPATIENT)
Dept: HEALTH INFORMATION MANAGEMENT | Facility: CLINIC | Age: 38
End: 2023-05-09

## 2023-05-09 LAB — RETINOPATHY: NEGATIVE

## 2023-05-10 ENCOUNTER — ALLIED HEALTH/NURSE VISIT (OUTPATIENT)
Dept: FAMILY MEDICINE | Facility: CLINIC | Age: 38
End: 2023-05-10
Payer: COMMERCIAL

## 2023-05-10 DIAGNOSIS — Z30.42 DEPO-PROVERA CONTRACEPTIVE STATUS: Primary | ICD-10-CM

## 2023-05-10 PROCEDURE — 96372 THER/PROPH/DIAG INJ SC/IM: CPT | Performed by: PHYSICIAN ASSISTANT

## 2023-05-10 PROCEDURE — 99207 PR NO CHARGE NURSE ONLY: CPT

## 2023-05-10 RX ADMIN — MEDROXYPROGESTERONE ACETATE 150 MG: 150 INJECTION, SUSPENSION INTRAMUSCULAR at 10:07

## 2023-05-10 NOTE — PROGRESS NOTES
Clinic Administered Medication Documentation      Depo Provera Documentation    Depo-Provera Standing Order inclusion/exclusion criteria reviewed.     Is this the initial or subsequent dose of Depo Provera? Subsequent dose - patient is within the acceptable window of time (11-15 weeks) for subsequent injection. Pregnancy test not indicated.    Patient meets: inclusion criteria     Is there an active order (written within the past 365 days, with administrations remaining, not ) in the chart? Yes.     Prior to injection, verified patient identity using patient's name and date of birth. Medication was administered. Please see MAR and medication order for additional information.     Vial/Syringe: Single dose vial. Was entire vial of medication used? Yes    Patient instructed to remain in clinic for 15 minutes and report any adverse reaction to staff immediately.  NEXT INJECTION DUE: 23 - 23    Verified that the patient has refills remaining in their prescription.    Trish Esquivel MA

## 2023-07-22 ENCOUNTER — HEALTH MAINTENANCE LETTER (OUTPATIENT)
Age: 38
End: 2023-07-22

## 2023-09-14 ENCOUNTER — MYC MEDICAL ADVICE (OUTPATIENT)
Dept: FAMILY MEDICINE | Facility: CLINIC | Age: 38
End: 2023-09-14
Payer: COMMERCIAL

## 2023-10-05 ENCOUNTER — ALLIED HEALTH/NURSE VISIT (OUTPATIENT)
Dept: INTERNAL MEDICINE | Facility: CLINIC | Age: 38
End: 2023-10-05
Payer: COMMERCIAL

## 2023-10-05 DIAGNOSIS — Z33.1 PREGNANCY, INCIDENTAL: Primary | ICD-10-CM

## 2023-10-05 LAB — HCG UR QL: NEGATIVE

## 2023-10-05 PROCEDURE — 96372 THER/PROPH/DIAG INJ SC/IM: CPT | Performed by: PHYSICIAN ASSISTANT

## 2023-10-05 PROCEDURE — 81025 URINE PREGNANCY TEST: CPT | Performed by: INTERNAL MEDICINE

## 2023-10-05 PROCEDURE — 99207 PR NO CHARGE NURSE ONLY: CPT | Performed by: INTERNAL MEDICINE

## 2023-10-05 RX ADMIN — MEDROXYPROGESTERONE ACETATE 150 MG: 150 INJECTION, SUSPENSION INTRAMUSCULAR at 14:24

## 2024-01-03 ENCOUNTER — ALLIED HEALTH/NURSE VISIT (OUTPATIENT)
Dept: FAMILY MEDICINE | Facility: CLINIC | Age: 39
End: 2024-01-03
Payer: COMMERCIAL

## 2024-01-03 DIAGNOSIS — Z30.42 DEPO-PROVERA CONTRACEPTIVE STATUS: Primary | ICD-10-CM

## 2024-01-03 PROCEDURE — 96372 THER/PROPH/DIAG INJ SC/IM: CPT | Performed by: NURSE PRACTITIONER

## 2024-01-03 PROCEDURE — 99207 PR NO CHARGE NURSE ONLY: CPT

## 2024-01-03 RX ORDER — MEDROXYPROGESTERONE ACETATE 150 MG/ML
150 INJECTION, SUSPENSION INTRAMUSCULAR
Status: COMPLETED | OUTPATIENT
Start: 2024-01-03 | End: 2024-09-26

## 2024-01-03 RX ADMIN — MEDROXYPROGESTERONE ACETATE 150 MG: 150 INJECTION, SUSPENSION INTRAMUSCULAR at 14:16

## 2024-01-03 NOTE — PROGRESS NOTES
Clinic Administered Medication Documentation      Depo Provera Documentation    Depo-Provera Standing Order inclusion/exclusion criteria reviewed.     Is this the initial or subsequent dose of Depo Provera? Subsequent dose - patient is within the acceptable window of time (11-15 weeks) for subsequent injection. Pregnancy test not indicated.    Patient meets: inclusion criteria     Is there an active order (written within the past 365 days, with administrations remaining, not ) in the chart? Yes.     Prior to injection, verified patient identity using patient's name and date of birth. Medication was administered. Please see MAR and medication order for additional information.     Vial/Syringe: Multi dose vial    Patient instructed to report any adverse reaction to staff immediately and return in window given .  NEXT INJECTION DUE: 3/20/24 - 24    Patient has no refills remaining.  Advise px due    Arya Disla CMA

## 2024-01-03 NOTE — PROGRESS NOTES
Patient is okay to get one more depo shot but no further shots until seen by provider for physical   Lashon Arambula NP on 1/3/2024 at 2:13 PM

## 2024-01-22 ENCOUNTER — E-VISIT (OUTPATIENT)
Dept: URGENT CARE | Facility: CLINIC | Age: 39
End: 2024-01-22
Payer: COMMERCIAL

## 2024-01-22 DIAGNOSIS — J06.9 VIRAL URI WITH COUGH: Primary | ICD-10-CM

## 2024-01-22 PROCEDURE — 99421 OL DIG E/M SVC 5-10 MIN: CPT | Performed by: NURSE PRACTITIONER

## 2024-01-22 RX ORDER — GUAIFENESIN 1200 MG/1
1200 TABLET, EXTENDED RELEASE ORAL 2 TIMES DAILY
Qty: 60 TABLET | Refills: 0 | Status: SHIPPED | OUTPATIENT
Start: 2024-01-22 | End: 2024-04-03

## 2024-01-22 RX ORDER — BENZONATATE 100 MG/1
100 CAPSULE ORAL 3 TIMES DAILY PRN
Qty: 30 CAPSULE | Refills: 0 | Status: SHIPPED | OUTPATIENT
Start: 2024-01-22 | End: 2024-04-03

## 2024-01-22 NOTE — PATIENT INSTRUCTIONS
Viral Respiratory Infection: Care Instructions  Overview     A viral respiratory infection is an infection of the nose, sinuses, or throat caused by a virus. Colds and the flu are common types of viral respiratory infections.  The symptoms of a viral respiratory infection often start quickly. They include a fever, sore throat, and runny nose. You may also just not feel well. Or you may not want to eat much.  Most viral infections can be treated with home care. This may include drinking lots of fluids and taking over-the-counter pain medicine. You will probably feel better in 4 to 10 days.  Antibiotics are not used to treat a viral infection. Antibiotics don't kill viruses, so they won't help cure a viral illness.  In some cases, a doctor may prescribe antiviral medicine to help your body fight a serious viral infection.  Follow-up care is a key part of your treatment and safety. Be sure to make and go to all appointments, and call your doctor if you are having problems. It's also a good idea to know your test results and keep a list of the medicines you take.  How can you care for yourself at home?  To prevent dehydration, drink plenty of fluids. Choose water and other clear liquids until you feel better. If you have kidney, heart, or liver disease and have to limit fluids, talk with your doctor before you increase the amount of fluids you drink.  Ask your doctor if you can take an over-the-counter pain medicine, such as acetaminophen (Tylenol), ibuprofen (Advil, Motrin), or naproxen (Aleve). Be safe with medicines. Read and follow all instructions on the label. No one younger than 20 should take aspirin. It has been linked to Reye syndrome, a serious illness.  Be careful when taking over-the-counter cold or flu medicines and Tylenol at the same time. Many of these medicines have acetaminophen, which is Tylenol. Read the labels to make sure that you are not taking more than the recommended dose. Too much  "acetaminophen (Tylenol) can be harmful.  Get plenty of rest.  Use saline (saltwater) nasal washes to help keep your nasal passages open and wash out mucus and allergens. You can buy saline nose sprays at a grocery store or drugstore. Follow the instructions on the package. Or you can make your own at home. Add 1 teaspoon of non-iodized salt and 1 teaspoon of baking soda to 2 cups of distilled or boiled and cooled water. Fill a squeeze bottle or neti pot with the nasal wash. Then put the tip into your nostril, and lean over the sink. With your mouth open, gently squirt the liquid. Repeat on the other side.  Use a vaporizer or humidifier to add moisture to your bedroom. Follow the instructions for cleaning the machine.  Do not smoke or allow others to smoke around you. If you need help quitting, talk to your doctor about stop-smoking programs and medicines. These can increase your chances of quitting for good.  When should you call for help?   Call 911 anytime you think you may need emergency care. For example, call if:    You have severe trouble breathing.   Call your doctor now or seek immediate medical care if:    You have a new or higher fever.     Your fever lasts more than 48 hours.     You have trouble breathing.     You have a fever with a stiff neck or a severe headache.     You are sensitive to light.     You feel very sleepy or confused.   Watch closely for changes in your health, and be sure to contact your doctor if:    You do not get better as expected.   Where can you learn more?  Go to https://www.[employer].net/patiented  Enter Q795 in the search box to learn more about \"Viral Respiratory Infection: Care Instructions.\"  Current as of: June 13, 2023               Content Version: 13.8    2320-3245 [employer].   Care instructions adapted under license by your healthcare professional. If you have questions about a medical condition or this instruction, always ask your healthcare " professional. [employer] disclaims any warranty or liability for your use of this information.      Thank you for choosing us for your care. I have placed an order for a prescription so that you can start treatment. View your full visit summary for details by clicking on the link below. Your pharmacist will able to address any questions you may have about the medication.     If you're not feeling better within 5-7 days, please schedule an appointment.  You can schedule an appointment right here in [facility] [employer], or call [phone]  If the visit is for the same symptoms as your eVisit, we'll refund the cost of your eVisit if seen within seven days.      Ordered you a cough suppressant and Mucinex

## 2024-02-17 ENCOUNTER — HEALTH MAINTENANCE LETTER (OUTPATIENT)
Age: 39
End: 2024-02-17

## 2024-04-03 ENCOUNTER — OFFICE VISIT (OUTPATIENT)
Dept: FAMILY MEDICINE | Facility: CLINIC | Age: 39
End: 2024-04-03
Payer: COMMERCIAL

## 2024-04-03 VITALS
WEIGHT: 184 LBS | DIASTOLIC BLOOD PRESSURE: 68 MMHG | TEMPERATURE: 98.1 F | HEART RATE: 74 BPM | OXYGEN SATURATION: 99 % | BODY MASS INDEX: 29.57 KG/M2 | RESPIRATION RATE: 12 BRPM | SYSTOLIC BLOOD PRESSURE: 120 MMHG | HEIGHT: 66 IN

## 2024-04-03 DIAGNOSIS — Z12.4 CERVICAL CANCER SCREENING: ICD-10-CM

## 2024-04-03 DIAGNOSIS — L30.9 NIPPLE DERMATITIS: ICD-10-CM

## 2024-04-03 DIAGNOSIS — Z00.00 ROUTINE GENERAL MEDICAL EXAMINATION AT A HEALTH CARE FACILITY: Primary | ICD-10-CM

## 2024-04-03 DIAGNOSIS — E10.9 TYPE 1 DIABETES MELLITUS WITHOUT COMPLICATION (H): ICD-10-CM

## 2024-04-03 PROCEDURE — 96372 THER/PROPH/DIAG INJ SC/IM: CPT | Performed by: NURSE PRACTITIONER

## 2024-04-03 PROCEDURE — 99213 OFFICE O/P EST LOW 20 MIN: CPT | Mod: 25 | Performed by: NURSE PRACTITIONER

## 2024-04-03 PROCEDURE — G0145 SCR C/V CYTO,THINLAYER,RESCR: HCPCS | Performed by: NURSE PRACTITIONER

## 2024-04-03 PROCEDURE — 99395 PREV VISIT EST AGE 18-39: CPT | Performed by: NURSE PRACTITIONER

## 2024-04-03 PROCEDURE — 87624 HPV HI-RISK TYP POOLED RSLT: CPT | Performed by: NURSE PRACTITIONER

## 2024-04-03 RX ORDER — TRIAMCINOLONE ACETONIDE 1 MG/G
OINTMENT TOPICAL 2 TIMES DAILY
Qty: 30 G | Refills: 1 | Status: SHIPPED | OUTPATIENT
Start: 2024-04-03

## 2024-04-03 RX ADMIN — MEDROXYPROGESTERONE ACETATE 150 MG: 150 INJECTION, SUSPENSION INTRAMUSCULAR at 10:37

## 2024-04-03 SDOH — HEALTH STABILITY: PHYSICAL HEALTH: ON AVERAGE, HOW MANY DAYS PER WEEK DO YOU ENGAGE IN MODERATE TO STRENUOUS EXERCISE (LIKE A BRISK WALK)?: 4 DAYS

## 2024-04-03 SDOH — HEALTH STABILITY: PHYSICAL HEALTH: ON AVERAGE, HOW MANY MINUTES DO YOU ENGAGE IN EXERCISE AT THIS LEVEL?: 50 MIN

## 2024-04-03 ASSESSMENT — SOCIAL DETERMINANTS OF HEALTH (SDOH): HOW OFTEN DO YOU GET TOGETHER WITH FRIENDS OR RELATIVES?: ONCE A WEEK

## 2024-04-03 ASSESSMENT — PAIN SCALES - GENERAL: PAINLEVEL: NO PAIN (0)

## 2024-04-03 NOTE — PROGRESS NOTES
"Preventive Care Visit  [facility]  LINA Gonzalez CNP, Family Medicine  Apr 3, 2024      Assessment & Plan     Guerline was seen today for physical.    Diagnoses and all orders for this visit:    Routine general medical examination at a health care facility  -     REVIEW OF HEALTH MAINTENANCE PROTOCOL ORDERS    Cervical cancer screening  -     Pap Screen with HPV - recommended age 30 - 65 years    Type 1 diabetes mellitus without complication (H)  Follows with Dr. Rojas - [facility]  Insulin pump in place.   MARINA obtained for recent labs.      Nipple dermatitis  Topical steroid use 2 times daily for 7-14 days then may use as needed on a cyclical basis.  Recommend use of Aquaphor ointment as well.    -     triamcinolone (KENALOG) 0.1 % external ointment; Apply topically 2 times daily    Other orders  -     PRIMARY CARE FOLLOW-UP SCHEDULING; Future        BMI  Estimated body mass index is 29.7 kg/m  as calculated from the following:    Height as of this encounter: 1.676 m (5' 6\").    Weight as of this encounter: 83.5 kg (184 lb).     Counseling  Appropriate preventive services were discussed with this patient.  Checklist reviewing preventive services available has been given to the patient.    Return in about 1 year (around 4/3/2025) for Preventative Visit .        Subjective   Guerline is a 38 year old, presenting for the following:  Physical        4/3/2024     9:44 AM   Additional Questions   Roomed by PAO Dean   Accompanied by SELF        HPI    Social:   for almost 11 years, three children (9, 8 and 5).    Owns a clothing and insulation business.     Diabetes Follow-up  Follows with Dr. Rojas - [facility]   Started insulin pump about 5 months ago.  Feeling like blood sugars are much more in range.      How often are you checking your blood sugar? Continuous glucose monitor  Have you had any blood sugars above 200?  YES  Have you had any " blood sugars below 70?  No  What symptoms do you notice when your blood sugar is low?  SWEATY   What concerns do you have today about your diabetes? None        BP Readings from Last 2 Encounters:   04/03/24 120/68   12/01/22 118/68     Hemoglobin A1C (%)   Date Value   01/07/2021 7.5 (A)   03/13/2020 7.5 (A)     LDL Cholesterol Calculated (mg/dL)   Date Value   01/07/2021 87   10/12/2018 107 (H)           4/3/2024   General Health   How would you rate your overall physical health? Good   Feel stress (tense, anxious, or unable to sleep) Only a little   (!) STRESS CONCERN      4/3/2024   Nutrition   Three or more servings of calcium each day? Yes   Diet: Regular (no restrictions)   How many servings of fruit and vegetables per day? (!) 2-3   How many sweetened beverages each day? 0-1         4/3/2024   Exercise   Days per week of moderate/strenous exercise 4 days   Average minutes spent exercising at this level 50 min         4/3/2024   Social Factors   Frequency of gathering with friends or relatives Once a week   Worry food won't last until get money to buy more No   Food not last or not have enough money for food? No   Do you have housing?  Yes   Are you worried about losing your housing? No   Lack of transportation? No   Unable to get utilities (heat,electricity)? No         4/3/2024   Dental   Dentist two times every year? Yes         4/3/2024   TB Screening   Were you born outside of the [place]? No         Today's PHQ-2 Score:       4/3/2024     9:37 AM   PHQ-2 ( 1999 [employer])   Q1: Little interest or pleasure in doing things 0   Q2: Feeling down, depressed or hopeless 0   PHQ-2 Score 0   Q1: Little interest or pleasure in doing things Not at all   Q2: Feeling down, depressed or hopeless Not at all   PHQ-2 Score 0           4/3/2024   Substance Use   Alcohol more than 3/day or more than 7/wk No   Do you use any other substances recreationally? No     Social History     Tobacco Use    Smoking status: Never     Smokeless tobacco: Never   Vaping Use    Vaping Use: Never used   Substance Use Topics    Alcohol use: Yes     Comment: socially     Drug use: No             4/3/2024   Breast Cancer Screening   Family history of breast, colon, or ovarian cancer? No / Unknown      Mammogram Screening - Patient under 40 years of age: Routine Mammogram Screening not recommended.         4/3/2024   STI Screening   New sexual partner(s) since last STI/HIV test? No     History of abnormal Pap smear: NO - age 30-65 PAP every 5 years with negative HPV co-testing recommended        Latest Ref Rng & Units 8/27/2018    11:26 AM 8/27/2018    10:45 AM 2/23/2016    12:00 AM   PAP / HPV   PAP (Historical)  NIL   NIL    HPV 16 DNA NEG^Negative  Negative     HPV 18 DNA NEG^Negative  Negative     Other HR HPV NEG^Negative  Negative             4/3/2024   Contraception/Family Planning   Questions about contraception or family planning No     Reviewed and updated as needed this visit by Provider                    BP Readings from Last 3 Encounters:   04/03/24 120/68   12/01/22 118/68   03/01/22 116/78    Wt Readings from Last 3 Encounters:   04/03/24 83.5 kg (184 lb)   12/01/22 78.3 kg (172 lb 11.2 oz)   03/01/22 76.2 kg (168 lb 1.6 oz)                  Patient Active Problem List   Diagnosis    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    S/P lumbar discectomy    Type 1 diabetes mellitus without complication (H)     Past Surgical History:   Procedure Laterality Date    DISCECTOMY LUMBAR MINIMALLY INVASIVE ONE LEVEL  2010, 2001    L4-L5    EYE SURGERY      FOOT SURGERY         Social History     Tobacco Use    Smoking status: Never    Smokeless tobacco: Never   Substance Use Topics    Alcohol use: Yes     Comment: socially      Family History   Problem Relation Age of Onset    Family History Negative Mother     Family History Negative Father          Current Outpatient Medications   Medication Sig Dispense Refill    triamcinolone (KENALOG) 0.1 %  "external ointment Apply topically 2 times daily 30 g 1    blood glucose monitoring (NO BRAND SPECIFIED) test strip Use to test blood sugars 6 times daily or as directed 100 strip 11    Continuous Blood Gluc Sensor (FREESTYLE KIYA 2 SENSOR) MISC       insulin aspart (NOVOLOG PEN) 100 UNIT/ML pen Inject 4 Units Subcutaneous Take with snacks or supplements for high blood sugar      insulin glargine (LANTUS SOLOSTAR PEN) 100 UNIT/ML pen Inject 20 Units Subcutaneous At Bedtime           Review of Systems  Constitutional, HEENT, cardiovascular, pulmonary, gi and gu systems are negative, except as otherwise noted.     Objective    Exam  /68   Pulse 74   Temp 98.1  F (36.7  C) (Tympanic)   Resp 12   Ht 1.676 m (5' 6\")   Wt 83.5 kg (184 lb)   SpO2 99%   BMI 29.70 kg/m     Estimated body mass index is 29.7 kg/m  as calculated from the following:    Height as of this encounter: 1.676 m (5' 6\").    Weight as of this encounter: 83.5 kg (184 lb).    Physical Exam    GENERAL: alert and no distress  EYES: Eyes grossly normal to inspection  HENT: ear canals and TM's normal, nose and mouth without ulcers or lesions  NECK: no adenopathy, no asymmetry, masses, or scars  RESP: lungs clear to auscultation - no rales, rhonchi or wheezes  BREAST: right distal nipple with scaling present, nipple elongated, no erythema or drainage present  CV: regular rate and rhythm, normal S1 S2, no S3 or S4, no murmur, click or rub, no peripheral edema  ABDOMEN: soft, nontender, no hepatosplenomegaly, no masses and bowel sounds normal  :  normal vaginal and cervix, pap smear obtained  MS: no gross musculoskeletal defects noted, no edema  SKIN: no suspicious lesions or rashes  NEURO: Normal strength and tone, mentation intact and speech normal  PSYCH: mentation appears normal, affect normal/bright        Signed Electronically by: Yohana Ray, APRN CNP    "

## 2024-04-03 NOTE — PATIENT INSTRUCTIONS
Preventive Care Advice   This is general advice given by our system to help you stay healthy. However, your care team may have specific advice just for you. Please talk to your care team about your preventive care needs.  Nutrition  Eat 5 or more servings of fruits and vegetables each day.  Try wheat bread, brown rice and whole grain pasta (instead of white bread, rice, and pasta).  Get enough calcium and vitamin D. Check the label on foods and aim for 100% of the RDA (recommended daily allowance).  Lifestyle  Exercise at least 150 minutes each week   (30 minutes a day, 5 days a week).  Do muscle strengthening activities 2 days a week. These help control your weight and prevent disease.  No smoking.  Wear sunscreen to prevent skin cancer.  Have a dental exam and cleaning every 6 months.  Yearly exams  See your health care team every year to talk about:  Any changes in your health.  Any medicines your care team has prescribed.  Preventive care, family planning, and ways to prevent chronic diseases.  Shots (vaccines)   HPV shots (up to age 26), if you've never had them before.  Hepatitis B shots (up to age 59), if you've never had them before.  COVID-19 shot: Get this shot when it's due.  Flu shot: Get a flu shot every year.  Tetanus shot: Get a tetanus shot every 10 years.  Pneumococcal, hepatitis A, and RSV shots: Ask your care team if you need these based on your risk.  Shingles shot (for age 50 and up).  General health tests  Diabetes screening:  Starting at age 35, Get screened for diabetes at least every 3 years.  If you are younger than age 35, ask your care team if you should be screened for diabetes.  Cholesterol test: At age 39, start having a cholesterol test every 5 years, or more often if advised.  Bone density scan (DEXA): At age 50, ask your care team if you should have this scan for osteoporosis (brittle bones).  Hepatitis C: Get tested at least once in your life.  STIs (sexually transmitted  infections)  Before age 24: Ask your care team if you should be screened for STIs.  After age 24: Get screened for STIs if you're at risk. You are at risk for STIs (including HIV) if:  You are sexually active with more than one person.  You don't use condoms every time.  You or a partner was diagnosed with a sexually transmitted infection.  If you are at risk for HIV, ask about PrEP medicine to prevent HIV.  Get tested for HIV at least once in your life, whether you are at risk for HIV or not.  Cancer screening tests  Cervical cancer screening: If you have a cervix, begin getting regular cervical cancer screening tests at age 21. Most people who have regular screenings with normal results can stop after age 65. Talk about this with your provider.  Breast cancer scan (mammogram): If you've ever had breasts, begin having regular mammograms starting at age 40. This is a scan to check for breast cancer.  Colon cancer screening: It is important to start screening for colon cancer at age 45.  Have a colonoscopy test every 10 years (or more often if you're at risk) Or, ask your provider about stool tests like a FIT test every year or Cologuard test every 3 years.  To learn more about your testing options, visit: https://www.[employer]/105664.pdf.  For help making a decision, visit: https://bit.ly/nu98272.  Prostate cancer screening test: If you have a prostate and are age 55 to 69, ask your provider if you would benefit from a yearly prostate cancer screening test.  Lung cancer screening: If you are a current or former smoker age 50 to 80, ask your care team if ongoing lung cancer screenings are right for you.  For informational purposes only. Not to replace the advice of your health care provider. Copyright   2023 [employer]. All rights reserved. Clinically reviewed by the [facility]. [employer] 571697 - REV 01/24.    Learning About Stress  What is stress?     Stress is your  body's response to a hard situation. Your body can have a physical, emotional, or mental response. Stress is a fact of life for most people, and it affects everyone differently. What causes stress for you may not be stressful for someone else.  A lot of things can cause stress. You may feel stress when you go on a job interview, take a test, or run a race. This kind of short-term stress is normal and even useful. It can help you if you need to work hard or react quickly. For example, stress can help you finish an important job on time.  Long-term stress is caused by ongoing stressful situations or events. Examples of long-term stress include long-term health problems, ongoing problems at work, or conflicts in your family. Long-term stress can harm your health.  How does stress affect your health?  When you are stressed, your body responds as though you are in danger. It makes hormones that speed up your heart, make you breathe faster, and give you a burst of energy. This is called the fight-or-flight stress response. If the stress is over quickly, your body goes back to normal and no harm is done.  But if stress happens too often or lasts too long, it can have bad effects. Long-term stress can make you more likely to get sick, and it can make symptoms of some diseases worse. If you tense up when you are stressed, you may develop neck, shoulder, or low back pain. Stress is linked to high blood pressure and heart disease.  Stress also harms your emotional health. It can make you jimenez, tense, or depressed. Your relationships may suffer, and you may not do well at work or school.  What can you do to manage stress?  You can try these things to help manage stress:   Do something active. Exercise or activity can help reduce stress. Walking is a great way to get started. Even everyday activities such as housecleaning or yard work can help.  Try yoga or norma chi. These techniques combine exercise and meditation. You may need  some training at first to learn them.  Do something you enjoy. For example, listen to music or go to a movie. Practice your hobby or do volunteer work.  Meditate. This can help you relax, because you are not worrying about what happened before or what may happen in the future.  Do guided imagery. Imagine yourself in any setting that helps you feel calm. You can use online videos, books, or a teacher to guide you.  Do breathing exercises. For example:  From a standing position, bend forward from the waist with your knees slightly bent. Let your arms dangle close to the floor.  Breathe in slowly and deeply as you return to a standing position. Roll up slowly and lift your head last.  Hold your breath for just a few seconds in the standing position.  Breathe out slowly and bend forward from the waist.  Let your feelings out. Talk, laugh, cry, and express anger when you need to. Talking with supportive friends or family, a counselor, or a chelsea leader about your feelings is a healthy way to relieve stress. Avoid discussing your feelings with people who make you feel worse.  Write. It may help to write about things that are bothering you. This helps you find out how much stress you feel and what is causing it. When you know this, you can find better ways to cope.  What can you do to prevent stress?  You might try some of these things to help prevent stress:  Manage your time. This helps you find time to do the things you want and need to do.  Get enough sleep. Your body recovers from the stresses of the day while you are sleeping.  Get support. Your family, friends, and community can make a difference in how you experience stress.  Limit your news feed. Avoid or limit time on social media or news that may make you feel stressed.  Do something active. Exercise or activity can help reduce stress. Walking is a great way to get started.  Where can you learn more?  Go to https://www.healthwise.net/patiented  Enter N032 in the  "search box to learn more about \"Learning About Stress.\"  Current as of: October 24, 2023               Content Version: 14.0    6168-2406 [employer].   Care instructions adapted under license by your healthcare professional. If you have questions about a medical condition or this instruction, always ask your healthcare professional. [employer] disclaims any warranty or liability for your use of this information.      "

## 2024-04-03 NOTE — NURSING NOTE
Clinic Administered Medication Documentation      Depo Provera Documentation    Depo-Provera Standing Order inclusion/exclusion criteria reviewed.     Is this the initial or subsequent dose of Depo Provera? Subsequent dose - patient is within the acceptable window of time (11-15 weeks) for subsequent injection. Pregnancy test not indicated.    Patient meets: inclusion criteria     Is there an active order (written within the past 365 days, with administrations remaining, not ) in the chart? Yes.     Prior to injection, verified patient identity using patient's name and date of birth. Medication was administered. Please see MAR and medication order for additional information.     Vial/Syringe: Single dose vial. Was entire vial of medication used? Yes    Patient instructed to remain in clinic for 15 minutes.  NEXT INJECTION DUE: 24 - 24    Verified that the patient has refills remaining in their prescription.

## 2024-04-05 LAB
BKR LAB AP GYN ADEQUACY: NORMAL
BKR LAB AP GYN INTERPRETATION: NORMAL
BKR LAB AP HPV REFLEX: NORMAL
BKR LAB AP PREVIOUS ABNORMAL: NORMAL
PATH REPORT.COMMENTS IMP SPEC: NORMAL
PATH REPORT.COMMENTS IMP SPEC: NORMAL
PATH REPORT.RELEVANT HX SPEC: NORMAL

## 2024-04-09 LAB
HUMAN PAPILLOMA VIRUS 16 DNA: NEGATIVE
HUMAN PAPILLOMA VIRUS 18 DNA: NEGATIVE
HUMAN PAPILLOMA VIRUS FINAL DIAGNOSIS: NORMAL
HUMAN PAPILLOMA VIRUS OTHER HR: NEGATIVE

## 2024-07-10 ENCOUNTER — ALLIED HEALTH/NURSE VISIT (OUTPATIENT)
Dept: FAMILY MEDICINE | Facility: CLINIC | Age: 39
End: 2024-07-10
Payer: COMMERCIAL

## 2024-07-10 DIAGNOSIS — Z30.42 DEPO-PROVERA CONTRACEPTIVE STATUS: Primary | ICD-10-CM

## 2024-07-10 PROCEDURE — 96372 THER/PROPH/DIAG INJ SC/IM: CPT | Performed by: PEDIATRICS

## 2024-07-10 PROCEDURE — 99207 PR NO CHARGE NURSE ONLY: CPT

## 2024-07-10 RX ADMIN — MEDROXYPROGESTERONE ACETATE 150 MG: 150 INJECTION, SUSPENSION INTRAMUSCULAR at 14:25

## 2024-07-10 NOTE — PROGRESS NOTES

## 2024-09-14 ENCOUNTER — HEALTH MAINTENANCE LETTER (OUTPATIENT)
Age: 39
End: 2024-09-14

## 2024-09-26 ENCOUNTER — ALLIED HEALTH/NURSE VISIT (OUTPATIENT)
Dept: FAMILY MEDICINE | Facility: CLINIC | Age: 39
End: 2024-09-26
Payer: COMMERCIAL

## 2024-09-26 DIAGNOSIS — Z30.42 DEPO-PROVERA CONTRACEPTIVE STATUS: Primary | ICD-10-CM

## 2024-09-26 PROCEDURE — 99207 PR NO CHARGE NURSE ONLY: CPT

## 2024-09-26 RX ADMIN — MEDROXYPROGESTERONE ACETATE 150 MG: 150 INJECTION, SUSPENSION INTRAMUSCULAR at 10:00

## 2024-09-26 NOTE — PROGRESS NOTES
Clinic Administered Medication Documentation        Patient was given Depo Provera. Prior to medication administration, verified patient's identity using patient s name and date of birth. Please see MAR and medication order for additional information. Patient instructed to remain in clinic for 15 minutes and report any adverse reaction to staff immediately.    Vial/Syringe: Single dose vial. Was entire vial of medication used? Yes    NEXT INJECTION DUE: 12/12/24 - 1/9/25    Blanche Rodriguez MA on 9/26/2024 at 10:00 AM

## 2025-01-09 ENCOUNTER — OFFICE VISIT (OUTPATIENT)
Dept: FAMILY MEDICINE | Facility: CLINIC | Age: 40
End: 2025-01-09
Payer: COMMERCIAL

## 2025-01-09 VITALS
HEIGHT: 66 IN | SYSTOLIC BLOOD PRESSURE: 112 MMHG | BODY MASS INDEX: 26.52 KG/M2 | DIASTOLIC BLOOD PRESSURE: 74 MMHG | WEIGHT: 165 LBS | RESPIRATION RATE: 14 BRPM | OXYGEN SATURATION: 99 % | TEMPERATURE: 98.8 F | HEART RATE: 67 BPM

## 2025-01-09 DIAGNOSIS — Z30.42 ENCOUNTER FOR SURVEILLANCE OF INJECTABLE CONTRACEPTIVE: Primary | ICD-10-CM

## 2025-01-09 RX ORDER — MEDROXYPROGESTERONE ACETATE 150 MG/ML
150 INJECTION, SUSPENSION INTRAMUSCULAR
Status: ACTIVE | OUTPATIENT
Start: 2025-01-09 | End: 2026-01-04

## 2025-01-09 RX ADMIN — MEDROXYPROGESTERONE ACETATE 150 MG: 150 INJECTION, SUSPENSION INTRAMUSCULAR at 11:48

## 2025-01-09 NOTE — PROGRESS NOTES
"  Assessment & Plan     Encounter for surveillance of injectable contraceptive  Chronic stable, continue current medical management.  3-month for reinjection.  - medroxyPROGESTERone (DEPO-PROVERA) syringe 150 mg            BMI  Estimated body mass index is 26.63 kg/m  as calculated from the following:    Height as of this encounter: 1.676 m (5' 6\").    Weight as of this encounter: 74.8 kg (165 lb).             Berny Cunha is a 39 year old, presenting for the following health issues:  Orders        1/9/2025    11:23 AM   Additional Questions   Roomed by Arya feng   Accompanied by self     HPI         Presents for follow-up regarding surveillance of contraception.  Has been Depo-Provera for over 5 years, tolerated well.  No headache, chest pain.  Planning to get mammogram at age 42.  Has no additional concerns.                Objective    /74 (BP Location: Right arm, Patient Position: Chair, Cuff Size: Adult Regular)   Pulse 67   Temp 98.8  F (37.1  C) (Oral)   Resp 14   Ht 1.676 m (5' 6\")   Wt 74.8 kg (165 lb)   SpO2 99%   Breastfeeding No   BMI 26.63 kg/m    Body mass index is 26.63 kg/m .  Physical Exam  Vitals reviewed.   Constitutional:       Appearance: She is not ill-appearing.   Cardiovascular:      Rate and Rhythm: Normal rate.   Pulmonary:      Effort: Pulmonary effort is normal.   Psychiatric:         Behavior: Behavior normal.                    Signed Electronically by: Carlos Butt MD    "

## 2025-01-09 NOTE — PROGRESS NOTES
Clinic Administered Medication Documentation        Patient was given Depo Provera. Prior to medication administration, verified patient's identity using patient s name and date of birth. Please see MAR and medication order for additional information. Patient instructed to remain in clinic for 15 minutes, report any adverse reaction to staff immediately, and return window given .    Vial/Syringe: Single dose vial. Was entire vial of medication used? Yes    NEXT INJECTION DUE: 3/27/25 - 4/24/25    Right marisaoid     Arya Disla CMA

## 2025-02-25 ENCOUNTER — OFFICE VISIT (OUTPATIENT)
Dept: INTERNAL MEDICINE | Facility: CLINIC | Age: 40
End: 2025-02-25
Payer: COMMERCIAL

## 2025-02-25 ENCOUNTER — ANCILLARY PROCEDURE (OUTPATIENT)
Dept: GENERAL RADIOLOGY | Facility: CLINIC | Age: 40
End: 2025-02-25
Payer: COMMERCIAL

## 2025-02-25 VITALS
HEIGHT: 66 IN | RESPIRATION RATE: 16 BRPM | SYSTOLIC BLOOD PRESSURE: 129 MMHG | HEART RATE: 104 BPM | TEMPERATURE: 99 F | OXYGEN SATURATION: 95 % | BODY MASS INDEX: 27.23 KG/M2 | DIASTOLIC BLOOD PRESSURE: 85 MMHG | WEIGHT: 169.4 LBS

## 2025-02-25 DIAGNOSIS — M54.2 NECK PAIN: ICD-10-CM

## 2025-02-25 DIAGNOSIS — M54.2 NECK PAIN: Primary | ICD-10-CM

## 2025-02-25 DIAGNOSIS — M62.838 MUSCLE SPASM: ICD-10-CM

## 2025-02-25 PROCEDURE — 3074F SYST BP LT 130 MM HG: CPT

## 2025-02-25 PROCEDURE — 1125F AMNT PAIN NOTED PAIN PRSNT: CPT

## 2025-02-25 PROCEDURE — 3079F DIAST BP 80-89 MM HG: CPT

## 2025-02-25 PROCEDURE — 99204 OFFICE O/P NEW MOD 45 MIN: CPT

## 2025-02-25 PROCEDURE — 72040 X-RAY EXAM NECK SPINE 2-3 VW: CPT | Mod: TC | Performed by: RADIOLOGY

## 2025-02-25 ASSESSMENT — PAIN SCALES - GENERAL: PAINLEVEL_OUTOF10: SEVERE PAIN (8)

## 2025-02-25 NOTE — PROGRESS NOTES
"  Assessment & Plan     Neck pain  Patient states that she has a chiropractor who she has not seen for a while.  Discussed with patient chiropractic care who could possibly help correct the subluxation.  Provided cervical neck pain exercises for her to do.  Instructed patient to ice her neck after doing the exercises and 2 to 3 hours later apply heat to promote healing and circulation.  Suggested naproxen/Aleve for an inflammatory/pain relief.  Cervical Spine Exam: Inspection: increased cervical lordosis, scapular winging  Tender:  left paracervical muscles, left trapezius muscles  Non-tender:  right paracervical muscles, right trapezius muscles  Range of Motion:  flexion:  decreased, painful, extension: decreased, painful, left lateral bending: decreased, painful, right lateral bending: full, pain-free, left lateral rotation:  decreased, painful, right lateral rotation:  full, pain-free  Strength: Full strength of all neck muscles  5/5 strength against resistance to left arm with bicep flexion and extension and shoulder abduction and adduction  - XR Cervical Spine 2/3 Views; Future  - Orthopedic  Referral; Future    Muscle spasm  Reminded patient that the tizanidine can make her sleepy so do not drive after she takes the medication unless she knows how it makes her feel.  Instructed patient to take the tizanidine before she does the neck exercises so she can get the most benefit out of the exercises and pain will not restrict her movements.    - tiZANidine (ZANAFLEX) 4 MG tablet; Take 1 tablet (4 mg) by mouth 3 times daily.          BMI  Estimated body mass index is 27.34 kg/m  as calculated from the following:    Height as of this encounter: 1.676 m (5' 6\").    Weight as of this encounter: 76.8 kg (169 lb 6.4 oz).             Berny Cunha is a 39 year old, presenting for the following health issues:  Pain (She has a pinched nerve in her neck.)      2/25/2025     9:39 AM   Additional Questions   Roomed " by Faye Castro MA   Accompanied by Self     Pain    History of Present Illness       Back Pain:  She presents for follow up of back pain. Patient's back pain is a new problem.    Original cause of back pain: other  First noticed back pain: in the last week  Patient feels back pain: constantlyLocation of back pain:  Left upper back and left side of neck  Description of back pain: dull ache, gnawing, sharp, shooting and stabbing  Back pain spreads: left shoulder and left side of neck    Since patient first noticed back pain, pain is: gradually worsening  Does back pain interfere with her job:  Not applicable  On a scale of 1-10 (10 being the worst), patient describes pain as:  8  What makes back pain worse: lying down   Acupuncture: not tried  Acetaminophen: not helpful  Activity or exercise: not helpful  Chiropractor:  Not tried  Cold: not tried  Heat: helpful  Massage: helpful  Muscle relaxants: not helpful  NSAIDS: not helpful  Opioids: not tried  Physical Therapy: not tried  Rest: not helpful  Steroid Injection: not helpful  Stretching: not helpful  Surgery: not tried  TENS unit: not tried  Topical pain relievers: helpful  Other healthcare providers patient is seeing for back pain: Other   She is taking medications regularly.   Pt states that 7 days ago she started having left sided neck pain with pain that radiates down her left arm with some tingling in her fingers. Pain rated as 8/10 described as aching.  Patient states that her left arm feels heavy.  Patient has tried a CBD oil on neck with some relief.  When patient was in [place] she was able to access Flexeril which she said did not do anything to relieve her pain.           Review of Systems  Constitutional, neuro, ENT, endocrine, pulmonary, cardiac, gastrointestinal, genitourinary, musculoskeletal, integument and psychiatric systems are negative, except as otherwise noted.      Objective    /85 (BP Location: Right arm, Patient Position: Sitting,  "Cuff Size: Adult Regular)   Pulse 104   Temp 99  F (37.2  C) (Oral)   Resp 16   Ht 1.676 m (5' 6\")   Wt 76.8 kg (169 lb 6.4 oz)   SpO2 95%   BMI 27.34 kg/m    Body mass index is 27.34 kg/m .  Physical Exam   GENERAL: alert and no distress  NECK: no adenopathy, no asymmetry, masses, or scars  RESP: lungs clear to auscultation - no rales, rhonchi or wheezes  CV: regular rate and rhythm, normal S1 S2, no S3 or S4, no murmur, click or rub, no peripheral edema  ABDOMEN: soft, nontender, no hepatosplenomegaly, no masses and bowel sounds normal  MS: no gross musculoskeletal defects noted, no edema  ORTHO: Cervical Spine Exam: Inspection: increased cervical lordosis, scapular winging  Tender:  left paracervical muscles, left trapezius muscles  Non-tender:  right paracervical muscles, right trapezius muscles  Range of Motion:  flexion:  decreased, painful, extension: decreased, painful, left lateral bending: decreased, painful, right lateral bending: full, pain-free, left lateral rotation:  decreased, painful, right lateral rotation:  full, pain-free  Strength: Full strength of all neck muscles  5/5 strength against resistance to left arm with bicep flexion and extension and shoulder abduction and adduction    SKIN: no suspicious lesions or rashes  NEURO: Normal strength and tone, mentation intact and speech normal  PSYCH: mentation appears normal, affect normal/bright            Signed Electronically by: LINA Her CNP    "

## 2025-02-25 NOTE — PATIENT INSTRUCTIONS
Tizanidine can make you sleepy so do not take and drive. Take and then do exercises. Apply ice after exercises. 2 hours later apply heat for circulation and healing.    Alleve is naproxen for antiinflammatory/pain

## 2025-02-26 ENCOUNTER — PATIENT OUTREACH (OUTPATIENT)
Dept: CARE COORDINATION | Facility: CLINIC | Age: 40
End: 2025-02-26
Payer: COMMERCIAL

## 2025-03-30 ENCOUNTER — HEALTH MAINTENANCE LETTER (OUTPATIENT)
Age: 40
End: 2025-03-30